# Patient Record
Sex: MALE | Race: BLACK OR AFRICAN AMERICAN | URBAN - METROPOLITAN AREA
[De-identification: names, ages, dates, MRNs, and addresses within clinical notes are randomized per-mention and may not be internally consistent; named-entity substitution may affect disease eponyms.]

---

## 2017-06-14 ENCOUNTER — EMERGENCY (EMERGENCY)
Facility: HOSPITAL | Age: 67
LOS: 0 days | Discharge: HOME | End: 2017-06-14

## 2017-06-14 DIAGNOSIS — E87.5 HYPERKALEMIA: ICD-10-CM

## 2017-06-28 DIAGNOSIS — N40.0 BENIGN PROSTATIC HYPERPLASIA WITHOUT LOWER URINARY TRACT SYMPTOMS: ICD-10-CM

## 2017-06-28 DIAGNOSIS — Z79.899 OTHER LONG TERM (CURRENT) DRUG THERAPY: ICD-10-CM

## 2017-06-28 DIAGNOSIS — I10 ESSENTIAL (PRIMARY) HYPERTENSION: ICD-10-CM

## 2017-06-28 DIAGNOSIS — M54.2 CERVICALGIA: ICD-10-CM

## 2017-06-28 DIAGNOSIS — Z79.891 LONG TERM (CURRENT) USE OF OPIATE ANALGESIC: ICD-10-CM

## 2019-02-11 ENCOUNTER — APPOINTMENT (OUTPATIENT)
Dept: CARDIOLOGY | Facility: CLINIC | Age: 69
End: 2019-02-11

## 2019-02-11 VITALS
HEIGHT: 71 IN | BODY MASS INDEX: 35.14 KG/M2 | SYSTOLIC BLOOD PRESSURE: 130 MMHG | DIASTOLIC BLOOD PRESSURE: 82 MMHG | WEIGHT: 251 LBS | HEART RATE: 74 BPM

## 2019-02-11 DIAGNOSIS — Z78.9 OTHER SPECIFIED HEALTH STATUS: ICD-10-CM

## 2019-02-11 NOTE — ASSESSMENT
[FreeTextEntry1] : Chest pain - atypical.\par PVC's - isolated\par BP - controlled\par DL - on Zocor.\par \par Obtain stress echo .\par Cath films reviewed.\par If normal, c/w primary prevention.\par Return after the test.

## 2019-02-11 NOTE — PHYSICAL EXAM
[Normal Appearance] : normal appearance [General Appearance - Well Nourished] : well nourished [No Deformities] : no deformities [General Appearance - In No Acute Distress] : no acute distress [Normal Conjunctiva] : the conjunctiva exhibited no abnormalities [Normal Oral Mucosa] : normal oral mucosa [Normal Oropharynx] : normal oropharynx [FreeTextEntry1] : no JVD [] : no respiratory distress [Respiration, Rhythm And Depth] : normal respiratory rhythm and effort [Auscultation Breath Sounds / Voice Sounds] : lungs were clear to auscultation bilaterally [Heart Rate And Rhythm] : heart rate and rhythm were normal [Heart Sounds] : normal S1 and S2 [Edema] : no peripheral edema present [Systolic grade ___/6] : A grade [unfilled]/6 systolic murmur was heard. [Bowel Sounds] : normal bowel sounds [Abdomen Soft] : soft [Abdomen Tenderness] : non-tender [Abnormal Walk] : normal gait [Nail Clubbing] : no clubbing of the fingernails [Cyanosis, Localized] : no localized cyanosis [Skin Color & Pigmentation] : normal skin color and pigmentation [Oriented To Time, Place, And Person] : oriented to person, place, and time [Affect] : the affect was normal

## 2019-02-11 NOTE — HISTORY OF PRESENT ILLNESS
[FreeTextEntry1] : 67 y/o male with history of HTN, DL, non-obstructive CAD (cath in 2015 - mild plaque). Presents for evaluation of occasional PVC's and discomfort in lower chest/epigastric area. No syncope. + constipation.

## 2019-03-19 ENCOUNTER — RESULT REVIEW (OUTPATIENT)
Age: 69
End: 2019-03-19

## 2019-03-19 ENCOUNTER — OUTPATIENT (OUTPATIENT)
Dept: OUTPATIENT SERVICES | Facility: HOSPITAL | Age: 69
LOS: 1 days | Discharge: HOME | End: 2019-03-19

## 2019-03-19 ENCOUNTER — TRANSCRIPTION ENCOUNTER (OUTPATIENT)
Age: 69
End: 2019-03-19

## 2019-03-19 VITALS
WEIGHT: 250 LBS | DIASTOLIC BLOOD PRESSURE: 96 MMHG | RESPIRATION RATE: 18 BRPM | HEART RATE: 68 BPM | TEMPERATURE: 98 F | SYSTOLIC BLOOD PRESSURE: 167 MMHG | HEIGHT: 70 IN

## 2019-03-19 VITALS — SYSTOLIC BLOOD PRESSURE: 147 MMHG | DIASTOLIC BLOOD PRESSURE: 76 MMHG | HEART RATE: 88 BPM | RESPIRATION RATE: 18 BRPM

## 2019-03-19 DIAGNOSIS — H26.9 UNSPECIFIED CATARACT: Chronic | ICD-10-CM

## 2019-03-19 NOTE — ASU DISCHARGE PLAN (ADULT/PEDIATRIC) - FOLLOW UP APPOINTMENTS
911 or go to the nearest Emergency Room [facility]:  Endoscopy/Ambulatory Surgery [place]...

## 2019-03-19 NOTE — ASU PATIENT PROFILE, ADULT - PMH
Back pain    Enlarged heart    Enlarged prostate    GERD (gastroesophageal reflux disease)    HTN (hypertension)    Hypercholesteremia    Sleep apnea

## 2019-03-20 LAB — SURGICAL PATHOLOGY STUDY: SIGNIFICANT CHANGE UP

## 2019-03-21 ENCOUNTER — APPOINTMENT (OUTPATIENT)
Dept: CARDIOLOGY | Facility: CLINIC | Age: 69
End: 2019-03-21

## 2019-03-25 DIAGNOSIS — K31.89 OTHER DISEASES OF STOMACH AND DUODENUM: ICD-10-CM

## 2019-03-25 DIAGNOSIS — Z87.891 PERSONAL HISTORY OF NICOTINE DEPENDENCE: ICD-10-CM

## 2019-03-25 DIAGNOSIS — K29.60 OTHER GASTRITIS WITHOUT BLEEDING: ICD-10-CM

## 2019-03-25 DIAGNOSIS — K26.9 DUODENAL ULCER, UNSPECIFIED AS ACUTE OR CHRONIC, WITHOUT HEMORRHAGE OR PERFORATION: ICD-10-CM

## 2019-03-25 DIAGNOSIS — E78.00 PURE HYPERCHOLESTEROLEMIA, UNSPECIFIED: ICD-10-CM

## 2019-03-25 DIAGNOSIS — I10 ESSENTIAL (PRIMARY) HYPERTENSION: ICD-10-CM

## 2019-04-01 ENCOUNTER — APPOINTMENT (OUTPATIENT)
Dept: CARDIOLOGY | Facility: CLINIC | Age: 69
End: 2019-04-01
Payer: COMMERCIAL

## 2019-04-01 VITALS
WEIGHT: 255 LBS | HEART RATE: 80 BPM | HEIGHT: 71 IN | BODY MASS INDEX: 35.7 KG/M2 | DIASTOLIC BLOOD PRESSURE: 88 MMHG | SYSTOLIC BLOOD PRESSURE: 134 MMHG

## 2019-04-01 PROCEDURE — 99213 OFFICE O/P EST LOW 20 MIN: CPT

## 2019-04-01 NOTE — HISTORY OF PRESENT ILLNESS
[FreeTextEntry1] : 68 y/o male with history of HTN, DL, non-obstructive CAD (cath in 2015 - mild plaque). Presents for evaluation of occasional PVC's and discomfort in lower chest/epigastric area. No syncope. + constipation. he was referred for a stress test, but cancelled and now would like to reschedule. No new events. BP is controlled.

## 2019-04-01 NOTE — ASSESSMENT
[FreeTextEntry1] : Chest pain - atypical.\par PVC's - isolated\par BP - controlled\par DL - on Zocor.\par \par Obtain stress echo . Reschedule\par Cath report reviewed.\par Rational for the test discussed with the patient.\par If normal, c/w primary prevention.\par Return after the test.

## 2019-04-02 PROBLEM — I51.7 CARDIOMEGALY: Chronic | Status: ACTIVE | Noted: 2019-03-19

## 2019-04-02 PROBLEM — K21.9 GASTRO-ESOPHAGEAL REFLUX DISEASE WITHOUT ESOPHAGITIS: Chronic | Status: ACTIVE | Noted: 2019-03-19

## 2019-04-02 PROBLEM — M54.9 DORSALGIA, UNSPECIFIED: Chronic | Status: ACTIVE | Noted: 2019-03-19

## 2019-04-02 PROBLEM — E78.00 PURE HYPERCHOLESTEROLEMIA, UNSPECIFIED: Chronic | Status: ACTIVE | Noted: 2019-03-19

## 2019-04-02 PROBLEM — G47.30 SLEEP APNEA, UNSPECIFIED: Chronic | Status: ACTIVE | Noted: 2019-03-19

## 2019-04-02 PROBLEM — I10 ESSENTIAL (PRIMARY) HYPERTENSION: Chronic | Status: ACTIVE | Noted: 2019-03-19

## 2019-04-02 PROBLEM — N40.0 BENIGN PROSTATIC HYPERPLASIA WITHOUT LOWER URINARY TRACT SYMPTOMS: Chronic | Status: ACTIVE | Noted: 2019-03-19

## 2019-05-14 ENCOUNTER — APPOINTMENT (OUTPATIENT)
Dept: CARDIOLOGY | Facility: CLINIC | Age: 69
End: 2019-05-14
Payer: COMMERCIAL

## 2019-05-14 PROCEDURE — 93351 STRESS TTE COMPLETE: CPT

## 2019-05-20 ENCOUNTER — APPOINTMENT (OUTPATIENT)
Dept: CARDIOLOGY | Facility: CLINIC | Age: 69
End: 2019-05-20
Payer: COMMERCIAL

## 2019-05-20 VITALS
BODY MASS INDEX: 35.42 KG/M2 | DIASTOLIC BLOOD PRESSURE: 78 MMHG | HEIGHT: 71 IN | SYSTOLIC BLOOD PRESSURE: 128 MMHG | HEART RATE: 91 BPM | WEIGHT: 253 LBS

## 2019-05-20 PROCEDURE — 99213 OFFICE O/P EST LOW 20 MIN: CPT

## 2019-05-20 PROCEDURE — 93000 ELECTROCARDIOGRAM COMPLETE: CPT

## 2019-05-20 NOTE — HISTORY OF PRESENT ILLNESS
[FreeTextEntry1] : 68 y/o male with history of HTN, DL, non-obstructive CAD (cath in 2015 - mild plaque). Presents for evaluation of occasional PVC's and discomfort in lower chest/epigastric area. No syncope. + constipation.  No new events. BP is controlled. Had a stress echo 5/14/19, which was negative for ischemia.

## 2019-05-20 NOTE — ASSESSMENT
[FreeTextEntry1] : Chest pain - atypical.\par PVC's - isolated\par BP - controlled\par DL - on Zocor.\par \par Negative stress echo 05/2019. \par Cath report - non-obstructive 2015\par c/w primary prevention.\par weight loss discussed.\par Return PRN

## 2019-07-16 NOTE — H&P PST ADULT - NSICDXPASTMEDICALHX_GEN_ALL_CORE_FT
PAST MEDICAL HISTORY:  Back pain     Enlarged heart     Enlarged prostate     GERD (gastroesophageal reflux disease)     HTN (hypertension)     Hypercholesteremia     Sleep apnea

## 2019-10-19 ENCOUNTER — EMERGENCY (EMERGENCY)
Facility: HOSPITAL | Age: 69
LOS: 0 days | Discharge: HOME | End: 2019-10-19
Attending: EMERGENCY MEDICINE | Admitting: EMERGENCY MEDICINE
Payer: COMMERCIAL

## 2019-10-19 VITALS
DIASTOLIC BLOOD PRESSURE: 77 MMHG | TEMPERATURE: 97 F | OXYGEN SATURATION: 97 % | RESPIRATION RATE: 18 BRPM | HEART RATE: 69 BPM | SYSTOLIC BLOOD PRESSURE: 144 MMHG

## 2019-10-19 VITALS
DIASTOLIC BLOOD PRESSURE: 75 MMHG | HEIGHT: 69 IN | RESPIRATION RATE: 18 BRPM | HEART RATE: 64 BPM | OXYGEN SATURATION: 96 % | WEIGHT: 250 LBS | SYSTOLIC BLOOD PRESSURE: 145 MMHG | TEMPERATURE: 97 F

## 2019-10-19 DIAGNOSIS — H26.9 UNSPECIFIED CATARACT: Chronic | ICD-10-CM

## 2019-10-19 DIAGNOSIS — R74.8 ABNORMAL LEVELS OF OTHER SERUM ENZYMES: ICD-10-CM

## 2019-10-19 DIAGNOSIS — I10 ESSENTIAL (PRIMARY) HYPERTENSION: ICD-10-CM

## 2019-10-19 DIAGNOSIS — Z87.891 PERSONAL HISTORY OF NICOTINE DEPENDENCE: ICD-10-CM

## 2019-10-19 DIAGNOSIS — E78.00 PURE HYPERCHOLESTEROLEMIA, UNSPECIFIED: ICD-10-CM

## 2019-10-19 DIAGNOSIS — R10.84 GENERALIZED ABDOMINAL PAIN: ICD-10-CM

## 2019-10-19 DIAGNOSIS — G89.29 OTHER CHRONIC PAIN: ICD-10-CM

## 2019-10-19 DIAGNOSIS — R10.13 EPIGASTRIC PAIN: ICD-10-CM

## 2019-10-19 LAB
ALBUMIN SERPL ELPH-MCNC: 4.5 G/DL — SIGNIFICANT CHANGE UP (ref 3.5–5.2)
ALP SERPL-CCNC: 60 U/L — SIGNIFICANT CHANGE UP (ref 30–115)
ALT FLD-CCNC: 22 U/L — SIGNIFICANT CHANGE UP (ref 0–41)
ANION GAP SERPL CALC-SCNC: 11 MMOL/L — SIGNIFICANT CHANGE UP (ref 7–14)
APPEARANCE UR: CLEAR — SIGNIFICANT CHANGE UP
AST SERPL-CCNC: 26 U/L — SIGNIFICANT CHANGE UP (ref 0–41)
BASOPHILS # BLD AUTO: 0.03 K/UL — SIGNIFICANT CHANGE UP (ref 0–0.2)
BASOPHILS NFR BLD AUTO: 0.6 % — SIGNIFICANT CHANGE UP (ref 0–1)
BILIRUB SERPL-MCNC: 0.6 MG/DL — SIGNIFICANT CHANGE UP (ref 0.2–1.2)
BILIRUB UR-MCNC: NEGATIVE — SIGNIFICANT CHANGE UP
BUN SERPL-MCNC: 24 MG/DL — HIGH (ref 10–20)
CALCIUM SERPL-MCNC: 9.9 MG/DL — SIGNIFICANT CHANGE UP (ref 8.5–10.1)
CHLORIDE SERPL-SCNC: 104 MMOL/L — SIGNIFICANT CHANGE UP (ref 98–110)
CO2 SERPL-SCNC: 25 MMOL/L — SIGNIFICANT CHANGE UP (ref 17–32)
COLOR SPEC: SIGNIFICANT CHANGE UP
CREAT SERPL-MCNC: 1.6 MG/DL — HIGH (ref 0.7–1.5)
DIFF PNL FLD: NEGATIVE — SIGNIFICANT CHANGE UP
EOSINOPHIL # BLD AUTO: 0.14 K/UL — SIGNIFICANT CHANGE UP (ref 0–0.7)
EOSINOPHIL NFR BLD AUTO: 3 % — SIGNIFICANT CHANGE UP (ref 0–8)
GLUCOSE SERPL-MCNC: 150 MG/DL — HIGH (ref 70–99)
GLUCOSE UR QL: NEGATIVE — SIGNIFICANT CHANGE UP
HCT VFR BLD CALC: 42.9 % — SIGNIFICANT CHANGE UP (ref 42–52)
HGB BLD-MCNC: 13.5 G/DL — LOW (ref 14–18)
IMM GRANULOCYTES NFR BLD AUTO: 0.2 % — SIGNIFICANT CHANGE UP (ref 0.1–0.3)
KETONES UR-MCNC: NEGATIVE — SIGNIFICANT CHANGE UP
LACTATE SERPL-SCNC: 2.2 MMOL/L — SIGNIFICANT CHANGE UP (ref 0.5–2.2)
LEUKOCYTE ESTERASE UR-ACNC: NEGATIVE — SIGNIFICANT CHANGE UP
LIDOCAIN IGE QN: 223 U/L — HIGH (ref 7–60)
LYMPHOCYTES # BLD AUTO: 2.28 K/UL — SIGNIFICANT CHANGE UP (ref 1.2–3.4)
LYMPHOCYTES # BLD AUTO: 48.7 % — SIGNIFICANT CHANGE UP (ref 20.5–51.1)
MCHC RBC-ENTMCNC: 28.4 PG — SIGNIFICANT CHANGE UP (ref 27–31)
MCHC RBC-ENTMCNC: 31.5 G/DL — LOW (ref 32–37)
MCV RBC AUTO: 90.3 FL — SIGNIFICANT CHANGE UP (ref 80–94)
MONOCYTES # BLD AUTO: 0.44 K/UL — SIGNIFICANT CHANGE UP (ref 0.1–0.6)
MONOCYTES NFR BLD AUTO: 9.4 % — HIGH (ref 1.7–9.3)
NEUTROPHILS # BLD AUTO: 1.78 K/UL — SIGNIFICANT CHANGE UP (ref 1.4–6.5)
NEUTROPHILS NFR BLD AUTO: 38.1 % — LOW (ref 42.2–75.2)
NITRITE UR-MCNC: NEGATIVE — SIGNIFICANT CHANGE UP
NRBC # BLD: 0 /100 WBCS — SIGNIFICANT CHANGE UP (ref 0–0)
PH UR: 6 — SIGNIFICANT CHANGE UP (ref 5–8)
PLATELET # BLD AUTO: 148 K/UL — SIGNIFICANT CHANGE UP (ref 130–400)
POTASSIUM SERPL-MCNC: 5.3 MMOL/L — HIGH (ref 3.5–5)
POTASSIUM SERPL-SCNC: 5.3 MMOL/L — HIGH (ref 3.5–5)
PROT SERPL-MCNC: 7 G/DL — SIGNIFICANT CHANGE UP (ref 6–8)
PROT UR-MCNC: NEGATIVE — SIGNIFICANT CHANGE UP
RBC # BLD: 4.75 M/UL — SIGNIFICANT CHANGE UP (ref 4.7–6.1)
RBC # FLD: 12 % — SIGNIFICANT CHANGE UP (ref 11.5–14.5)
SODIUM SERPL-SCNC: 140 MMOL/L — SIGNIFICANT CHANGE UP (ref 135–146)
SP GR SPEC: 1.02 — SIGNIFICANT CHANGE UP (ref 1.01–1.02)
UROBILINOGEN FLD QL: SIGNIFICANT CHANGE UP
WBC # BLD: 4.68 K/UL — LOW (ref 4.8–10.8)
WBC # FLD AUTO: 4.68 K/UL — LOW (ref 4.8–10.8)

## 2019-10-19 PROCEDURE — 99284 EMERGENCY DEPT VISIT MOD MDM: CPT

## 2019-10-19 PROCEDURE — 74176 CT ABD & PELVIS W/O CONTRAST: CPT | Mod: 26

## 2019-10-19 PROCEDURE — 93010 ELECTROCARDIOGRAM REPORT: CPT

## 2019-10-19 RX ORDER — FAMOTIDINE 10 MG/ML
20 INJECTION INTRAVENOUS ONCE
Refills: 0 | Status: COMPLETED | OUTPATIENT
Start: 2019-10-19 | End: 2019-10-19

## 2019-10-19 RX ORDER — SODIUM CHLORIDE 9 MG/ML
1000 INJECTION INTRAMUSCULAR; INTRAVENOUS; SUBCUTANEOUS ONCE
Refills: 0 | Status: COMPLETED | OUTPATIENT
Start: 2019-10-19 | End: 2019-10-19

## 2019-10-19 RX ADMIN — SODIUM CHLORIDE 2000 MILLILITER(S): 9 INJECTION INTRAMUSCULAR; INTRAVENOUS; SUBCUTANEOUS at 07:58

## 2019-10-19 RX ADMIN — FAMOTIDINE 20 MILLIGRAM(S): 10 INJECTION INTRAVENOUS at 12:05

## 2019-10-19 RX ADMIN — SODIUM CHLORIDE 1000 MILLILITER(S): 9 INJECTION INTRAMUSCULAR; INTRAVENOUS; SUBCUTANEOUS at 10:47

## 2019-10-19 NOTE — ED PROVIDER NOTE - PMH
Back pain    Enlarged heart    Enlarged prostate    GERD (gastroesophageal reflux disease)    HTN (hypertension)    Hypercholesteremia    Sleep apnea Arthritis    Back pain    Borderline diabetes    Enlarged heart    Enlarged prostate    GERD (gastroesophageal reflux disease)    HTN (hypertension)    Hypercholesteremia    Renal disease    Sleep apnea

## 2019-10-19 NOTE — ED PROVIDER NOTE - NSFOLLOWUPINSTRUCTIONS_ED_ALL_ED_FT
Please call to make an appointment to follow up with your PMD and GI as outpatient.        Abdominal Pain    Many things can cause abdominal pain. . Your health care provider will do a physical exam to determine if there is a dangerous cause of your pain; blood tests and imaging can sometimes help determine the cause of your pain. However, in many cases, no cause may be found and you may need further testing as an outpatient. Monitor your abdominal pain for any changes.     SEEK IMMEDIATE MEDICAL CARE IF YOU HAVE ANY OF THE FOLLOWING SYMPTOMS: worsening abdominal pain, uncontrollable vomiting, profuse diarrhea, inability to have bowel movements or pass gas, black or bloody stools, fever accompanying chest pain or back pain, or fainting. These symptoms may represent a serious problem that is an emergency. Do not wait to see if the symptoms will go away. Get medical help right away. Call 911 and do not drive yourself to the hospital.

## 2019-10-19 NOTE — ED ADULT NURSE NOTE - CHPI ED NUR SYMPTOMS NEG
no vomiting/no hematuria/no nausea/no dysuria/no fever/no blood in stool/no burning urination/no chills

## 2019-10-19 NOTE — ED PROVIDER NOTE - ATTENDING CONTRIBUTION TO CARE
70 yo male h/o HTN, CKD, arthritis, GERD, elevated cholesterol c/o vague upper abdominal and back pain for a few months, worse over past few weeks.  Pain is non-radiating , located mostly on  in a band like distribution  under the ribs, not associated with PO intake or breathing.  Patient denies  fever, chills, weight loss, SOB. cough, CP, urinary complaints, black or bloody stools. no focal weakness or paresthesias, no change in exercise tolerance.  Did not take anything for pain and declined analgesia in ED.  Has seen his PMD for this already and has a follow up plan in place.  Well-appearing, well-nourished male, NAD, PERRL, mmm, nml work of breathing, lungs CTA b/l, equal air entry, RRR, distal pulses intact, no midline spine or CVA ttp, abdomen soft, non-distended  mild epigastric ttp without rebound or guarding, no focal neuro deficits.  Will check labs, CT scan.

## 2019-10-19 NOTE — ED PROVIDER NOTE - NS ED ROS FT
Constitutional: (-) fever (-) chills   Cardiac: (-) chest pain    Respiratory: (-) SOB  GI: (+) abdominal pain (+) loose stool (-) nausea (-) vomiting (-) hematochezia (-) changes in appetite (-) hx of nephrolithiasis  : (-) dysuria (-) increased frequency  (-) hematuria (-) incontinence  MS: (+) chronic back pain   Neuro: (-) headache (-) dizziness (-) numbness/tingling to extremities B/L (-) weakness  Skin: (-) rash   Except as documented in the HPI, all other systems are negative.

## 2019-10-19 NOTE — ED PROVIDER NOTE - PATIENT PORTAL LINK FT
You can access the FollowMyHealth Patient Portal offered by [facility] by registering at the following website: [email or web address]. By joining [employer] [facility]’s FollowMyHealth portal, you will also be able to view your health information using other applications (apps) compatible with our system.

## 2019-10-19 NOTE — ED PROVIDER NOTE - NSFOLLOWUPCLINICS_GEN_ALL_ED_FT
[facility]  Medicine  [address], [place]   Phone: [phone]  Fax:   Follow Up Time:

## 2019-10-19 NOTE — ED ADULT NURSE REASSESSMENT NOTE - NS ED NURSE REASSESS COMMENT FT1
pt a&ox4,steady gait exhibited, denies CP/SOB, c/o abdominal pain x4 weeks, awaiting CT A&P, IV placed, labs sent.

## 2019-10-19 NOTE — ED PROVIDER NOTE - CLINICAL SUMMARY MEDICAL DECISION MAKING FREE TEXT BOX
70 yo male with chronic abdominal pain, work up in ED negative, has appropriate follow up for further testing, strict return precautions given. 70 yo male with chronic abdominal pain, work up in ED negative, has appropriate follow up for further testing, strict return precautions given..

## 2019-10-19 NOTE — ED PROVIDER NOTE - PHYSICAL EXAMINATION
GENERAL: Well-nourished, Well-developed. NAD  Eyes: PERRLA, EOMI  ENMT: MMM  Neck: FROM  CVS: Normal S1,S2. No murmurs appreciated on auscultation   RESP: No use of accessory muscles. Chest rise symmetrical with good expansion. Lungs clear to auscultation B/L. No wheezing, rales, or rhonchi auscultated.  GI: + TTP to midepigastric region. Normal auscultation of bowel sounds in all 4 quadrants. Soft, Nondistended. No guarding or rebound tenderness. No CVAT B/L.  MSK: No visible signs of trauma such as ecchymosis, erythema, or swelling. FROM of upper and lower extremities B/L. No midline spinal TTP. No TTP to sciatic notch B/L.  Skin: Warm, Dry. No rashes or lesions.   EXT: Radial pulses present B/L. No calf tenderness or swelling B/L.   Neuro: AA&O x 3. CNs II-XII grossly intact. Speaking in full sentences. No slurring of speech. No facial droop. No tremors. Sensation grossly intact. Strength 5/5 B/L. Gait within normal limits.   Psych: Cooperative. Calm

## 2019-10-19 NOTE — ED PROVIDER NOTE - PROGRESS NOTE DETAILS
Lipase is elevated  to 223, no pain whole eating/drinking, CT scan negative ( non-contrast, patient refused it); he told me he is already scheduled for MRI of the abdomen in 10 days and has a follow up plan.  Patient to be discharged from ED. Any available test results were discussed with patient Verbal instructions given, including instructions to return to ED immediately for any new, worsening, or concerning symptoms. Patient endorsed understanding. Written discharge instructions additionally given, including follow-up plan.  Patient was given opportunity to ask questions.

## 2019-10-19 NOTE — ED PROVIDER NOTE - OBJECTIVE STATEMENT
70 yo male former smoker with PMH of borderline DM, HTN, Early SOILA (controlled by lifestyle changes as of now), arthritis, 68 yo male former smoker with PMH of borderline DM, HTN, Early SOILA (controlled by lifestyle changes as of now), arthritis presents to the ED c/o intermittent diffuse abdominal pain x 4 weeks associated with episodes of loose, non-bloody stools x  4 weeks and c/o lower back pain that radiates down legs B/L.  Denies fever, chills, chest pain, SOB, bloody stools, dysuria, hematuria, hx of nephrolithiasis, or N/V/D.

## 2019-10-19 NOTE — ED ADULT NURSE NOTE - OBJECTIVE STATEMENT
Patient is a 68 yo male c/o intermittent abd cramping that radiates to bilateral groin x 4 weeks, patient also c/o constipation, LBM was yesterday. denies nausea, vomiting, urinary symptoms, fever and chills.

## 2019-10-19 NOTE — ED PROVIDER NOTE - CARE PROVIDER_API CALL
Herbie Agee)  Gastroenterology; Internal Medicine  [address] [place] [address]  Phone: [phone]  Fax: [phone]  Follow Up Time:     YOUR PMD AND GI,   Phone: (   )    -  Fax: (   )    -  Follow Up Time:     Red Huerta)  Gastroenterology; Internal Medicine  [address] [place] [address]  Phone: [phone]  Fax: [phone]  Follow Up Time:

## 2019-10-20 LAB
CULTURE RESULTS: SIGNIFICANT CHANGE UP
SPECIMEN SOURCE: SIGNIFICANT CHANGE UP

## 2019-12-05 PROBLEM — M19.90 UNSPECIFIED OSTEOARTHRITIS, UNSPECIFIED SITE: Chronic | Status: ACTIVE | Noted: 2019-10-19

## 2019-12-05 PROBLEM — N28.9 DISORDER OF KIDNEY AND URETER, UNSPECIFIED: Chronic | Status: ACTIVE | Noted: 2019-10-19

## 2019-12-05 PROBLEM — R73.03 PREDIABETES: Chronic | Status: ACTIVE | Noted: 2019-10-19

## 2019-12-19 ENCOUNTER — APPOINTMENT (OUTPATIENT)
Dept: UROLOGY | Facility: CLINIC | Age: 69
End: 2019-12-19
Payer: COMMERCIAL

## 2019-12-19 VITALS — HEIGHT: 71 IN | BODY MASS INDEX: 35.42 KG/M2 | WEIGHT: 253 LBS

## 2019-12-19 VITALS — SYSTOLIC BLOOD PRESSURE: 169 MMHG | HEART RATE: 81 BPM | DIASTOLIC BLOOD PRESSURE: 86 MMHG

## 2019-12-19 PROCEDURE — 99204 OFFICE O/P NEW MOD 45 MIN: CPT

## 2019-12-19 RX ORDER — PREGABALIN 100 MG/1
100 CAPSULE ORAL 3 TIMES DAILY
Refills: 0 | Status: COMPLETED | COMMUNITY
End: 2019-12-19

## 2019-12-19 RX ORDER — ACETAMINOPHEN 325 MG/1
325 TABLET ORAL EVERY 6 HOURS
Refills: 0 | Status: COMPLETED | COMMUNITY
End: 2019-12-19

## 2019-12-19 RX ORDER — EPLERENONE 25 MG/1
25 TABLET, COATED ORAL DAILY
Refills: 0 | Status: COMPLETED | COMMUNITY
End: 2019-12-19

## 2019-12-19 RX ORDER — LUBIPROSTONE 24 UG/1
24 CAPSULE, GELATIN COATED ORAL DAILY
Refills: 0 | Status: COMPLETED | COMMUNITY
End: 2019-12-19

## 2019-12-19 RX ORDER — NALDEMEDINE 0.2 MG/1
0.2 TABLET ORAL DAILY
Refills: 0 | Status: COMPLETED | COMMUNITY
End: 2019-12-19

## 2019-12-19 RX ORDER — EPLERENONE 25 MG/1
25 TABLET, COATED ORAL
Refills: 0 | Status: ACTIVE | COMMUNITY

## 2019-12-19 RX ORDER — DOCUSATE SODIUM 100 MG/1
100 CAPSULE ORAL 3 TIMES DAILY
Refills: 0 | Status: COMPLETED | COMMUNITY
End: 2019-12-19

## 2019-12-19 RX ORDER — CALCIUM CARBONATE/VITAMIN D3 500MG-5MCG
500-200 TABLET ORAL DAILY
Refills: 0 | Status: COMPLETED | COMMUNITY
End: 2019-12-19

## 2019-12-19 RX ORDER — ALFUZOSIN HYDROCHLORIDE 10 MG/1
10 TABLET, EXTENDED RELEASE ORAL AT BEDTIME
Refills: 0 | Status: COMPLETED | COMMUNITY
End: 2019-12-19

## 2019-12-19 RX ORDER — METHYLNALTREXONE BROMIDE 150 MG/1
150 TABLET ORAL
Refills: 0 | Status: COMPLETED | COMMUNITY
End: 2019-12-19

## 2019-12-19 RX ORDER — SIMVASTATIN 20 MG/1
20 TABLET, FILM COATED ORAL AT BEDTIME
Refills: 0 | Status: COMPLETED | COMMUNITY
End: 2019-12-19

## 2019-12-19 RX ORDER — FOLIC ACID 1 MG/1
1 TABLET ORAL DAILY
Refills: 0 | Status: COMPLETED | COMMUNITY
End: 2019-12-19

## 2019-12-19 RX ORDER — DEXLANSOPRAZOLE 60 MG/1
60 CAPSULE, DELAYED RELEASE ORAL
Refills: 0 | Status: COMPLETED | COMMUNITY
End: 2019-12-19

## 2019-12-19 NOTE — LETTER HEADER
[FreeTextEntry3] : Sherly Shepard M.D.\par Director of Urology\par [facility]\par [address] [place] [address], [address]\[place] [address]

## 2019-12-19 NOTE — ASSESSMENT
[FreeTextEntry1] : His PSA, at 4. 6 mg/mL could be slightly inaccurate as we do not know how long and how compliant he's been with Dutasteride. He had blood work, from February 2018 that showed a PSA of 2.4. Again we do not know if he was on Dutasteride at that time. His MRI is of little value as is without contrast and was not appropriate prostate MRI\par \par For now he will continue Dutasteride, and understands to be compliant for 3 months and THEN we'll get a repeat PSA at that time. If value is still 4.6, or higher, will discuss repeat MRI, then with pre and post gadolinium or prostate biopsy.\par \par Concerning his erectile dysfunction, we'll obtain a full hormonal panel, especially since he's been on Arimidex and noncompliant with this medication, for further investigation. Additionally, secondary to his cardiac history, we will request clearance for PDE 5 inhibitors.\par \par With regards to his urination, we'll obtain renal/bladder ultrasound, UA CS and cytology, he will keep a voiding diary, and follow up for possible uroflow study

## 2019-12-19 NOTE — LETTER BODY
[Dear  ___] : Dear  [unfilled], [Consult Letter:] : I had the pleasure of evaluating your patient, [unfilled]. [Please see my note below.] : Please see my note below. [Consult Closing:] : Thank you very much for allowing me to participate in the care of this patient.  If you have any questions, please do not hesitate to contact me. [Sincerely,] : Sincerely, [FreeTextEntry2] : Dr. Claudia Victor\par [address] [facility]\[place] [address]

## 2019-12-19 NOTE — HISTORY OF PRESENT ILLNESS
[Urinary Urgency] : urinary urgency [Urinary Frequency] : urinary frequency [Erectile Dysfunction] : Erectile Dysfunction [FreeTextEntry1] : Braydon is a 69-year-old male with a history of BPH and possible urinary tract symptoms who presents for evaluation of elevated PSA value.\par \par Initially he was followed by Dr. Reveles who started him on Rapaflo for possible urinary tract symptoms. He reports improvement in his voiding reduction of his frequency, urgency, and nocturia. Approximately 3 months ago he was placed on Dutasteride for continued urinary symptoms. He states he has been noncompliant with his medication taking it intermittently over 3 months. He discontinued one month ago and has restarted approximately 2-3 weeks ago.\par \par He was told by Dr. Reveles initially that his PSA was within normal limits. He obtained a PSA, from his PCP, in December 2019 which are value of 4.6 ng/mL. \par His PCP ordered a prostate MRI however, which was done just without contrast and revealed a lobulated heterogeneous gland of the prostate impressing the urinary bladder. Unremarkable peripheral zone. No pelvic or inguinal adenopathy.\par \par He was instructed to follow up with urology. He was unable to get an appointment with Dr. Reveles and did not want to wait to see him and elects to follow up here instead.\par \par Additionally he has history of elevated estradiol has been placed on arimidex. He takes 1 mg one time per week as he says it is too small to cut although he was told to take a half tablet QOD, he is not doing so.\par \par He reports decreased libido/energy with erectile dysfunction, x 6 months. Reports inability to obtain or maintain an erection. He has not tried PDE 5 inhibitors.

## 2019-12-19 NOTE — PHYSICAL EXAM
[General Appearance - Well Developed] : well developed [General Appearance - Well Nourished] : well nourished [Normal Appearance] : normal appearance [Well Groomed] : well groomed [General Appearance - In No Acute Distress] : no acute distress [Heart Rate And Rhythm] : Heart rate and rhythm were normal [Respiration, Rhythm And Depth] : normal respiratory rhythm and effort [Exaggerated Use Of Accessory Muscles For Inspiration] : no accessory muscle use [Bowel Sounds] : normal bowel sounds [Auscultation Breath Sounds / Voice Sounds] : lungs were clear to auscultation bilaterally [Abdomen Soft] : soft [Abdomen Tenderness] : non-tender [Costovertebral Angle Tenderness] : no ~M costovertebral angle tenderness [Urethral Meatus] : meatus normal [Penis Abnormality] : normal circumcised penis [Urinary Bladder Findings] : the bladder was normal on palpation [Scrotum] : the scrotum was normal [Epididymis] : the epididymides were normal [Testes Tenderness] : no tenderness of the testes [Testes Mass (___cm)] : there were no testicular masses [Anus Abnormality] : the anus and perineum were normal [Prostate Enlargement] : the prostate was not enlarged [Prostate Tenderness] : the prostate was not tender [No Prostate Nodules] : no prostate nodules [Prostate Size ___ gm] : prostate size [unfilled] gm [Normal Station and Gait] : the gait and station were normal for the patient's age [] : no rash [No Focal Deficits] : no focal deficits [Affect] : the affect was normal [Oriented To Time, Place, And Person] : oriented to person, place, and time [Not Anxious] : not anxious [Mood] : the mood was normal [Femoral Lymph Nodes Enlarged Bilaterally] : femoral [Inguinal Lymph Nodes Enlarged Bilaterally] : inguinal [FreeTextEntry1] : Intracavernosal sponge plaque with some sinusoidal atrophy. Digital rectal examination reveals decreased rectal tone with a less than 10 g prostate. Normal BC reflex

## 2019-12-25 ENCOUNTER — FORM ENCOUNTER (OUTPATIENT)
Age: 69
End: 2019-12-25

## 2019-12-26 ENCOUNTER — OUTPATIENT (OUTPATIENT)
Dept: OUTPATIENT SERVICES | Facility: HOSPITAL | Age: 69
LOS: 1 days | Discharge: HOME | End: 2019-12-26
Payer: COMMERCIAL

## 2019-12-26 DIAGNOSIS — N52.9 MALE ERECTILE DYSFUNCTION, UNSPECIFIED: ICD-10-CM

## 2019-12-26 DIAGNOSIS — R68.82 DECREASED LIBIDO: ICD-10-CM

## 2019-12-26 DIAGNOSIS — R39.15 URGENCY OF URINATION: ICD-10-CM

## 2019-12-26 DIAGNOSIS — H26.9 UNSPECIFIED CATARACT: Chronic | ICD-10-CM

## 2019-12-26 DIAGNOSIS — N40.1 BENIGN PROSTATIC HYPERPLASIA WITH LOWER URINARY TRACT SYMPTOMS: ICD-10-CM

## 2019-12-26 DIAGNOSIS — R97.20 ELEVATED PROSTATE SPECIFIC ANTIGEN [PSA]: ICD-10-CM

## 2019-12-26 PROCEDURE — 76770 US EXAM ABDO BACK WALL COMP: CPT | Mod: 26

## 2020-01-13 ENCOUNTER — APPOINTMENT (OUTPATIENT)
Dept: CARDIOLOGY | Facility: CLINIC | Age: 70
End: 2020-01-13
Payer: COMMERCIAL

## 2020-01-13 VITALS
SYSTOLIC BLOOD PRESSURE: 142 MMHG | HEIGHT: 71 IN | DIASTOLIC BLOOD PRESSURE: 82 MMHG | HEART RATE: 72 BPM | WEIGHT: 256 LBS | BODY MASS INDEX: 35.84 KG/M2

## 2020-01-13 PROCEDURE — 99214 OFFICE O/P EST MOD 30 MIN: CPT

## 2020-01-13 PROCEDURE — 93000 ELECTROCARDIOGRAM COMPLETE: CPT

## 2020-01-13 RX ORDER — OMEPRAZOLE 20 MG/1
TABLET, DELAYED RELEASE ORAL
Refills: 0 | Status: DISCONTINUED | COMMUNITY
End: 2020-01-13

## 2020-01-13 NOTE — PHYSICAL EXAM
[General Appearance - Well Nourished] : well nourished [No Deformities] : no deformities [Normal Appearance] : normal appearance [General Appearance - In No Acute Distress] : no acute distress [Normal Oral Mucosa] : normal oral mucosa [Normal Oropharynx] : normal oropharynx [Normal Conjunctiva] : the conjunctiva exhibited no abnormalities [] : no respiratory distress [FreeTextEntry1] : no JVD [Respiration, Rhythm And Depth] : normal respiratory rhythm and effort [Auscultation Breath Sounds / Voice Sounds] : lungs were clear to auscultation bilaterally [Heart Rate And Rhythm] : heart rate and rhythm were normal [Heart Sounds] : normal S1 and S2 [Systolic grade ___/6] : A grade [unfilled]/6 systolic murmur was heard. [Edema] : no peripheral edema present [Abdomen Soft] : soft [Bowel Sounds] : normal bowel sounds [Abdomen Tenderness] : non-tender [Abnormal Walk] : normal gait [Nail Clubbing] : no clubbing of the fingernails [Cyanosis, Localized] : no localized cyanosis [Skin Color & Pigmentation] : normal skin color and pigmentation [Affect] : the affect was normal [Oriented To Time, Place, And Person] : oriented to person, place, and time

## 2020-01-13 NOTE — ASSESSMENT
[FreeTextEntry1] : Chest pain - atypical.\par PVC's - isolated\par BP - controlled\par DL - on Crestor.\par ED - low risk for sexual activity\par Negative stress echo 05/2019. \par Cath report - non-obstructive 2015\par c/w primary prevention.\par weight loss discussed.\par Obtain 2D echo to assess cardiomegaly in the setting of LBBB. R/o cardiomyopathy.\par Return after the echo

## 2020-01-13 NOTE — HISTORY OF PRESENT ILLNESS
[FreeTextEntry1] : 68 y/o male with history of HTN, DL, non-obstructive CAD (cath in 2015 - mild plaque). Presents for f/u of occasional PVC's and discomfort in lower chest/epigastric area. No syncope. + constipation.  No new events. BP is controlled. Had a stress echo 5/14/19, which was negative for ischemia. Now with LBBB on ECG. No chest pain today. Had esophagram - noted to have cardiomegaly.

## 2020-01-16 ENCOUNTER — APPOINTMENT (OUTPATIENT)
Dept: UROLOGY | Facility: CLINIC | Age: 70
End: 2020-01-16
Payer: MEDICARE

## 2020-01-16 VITALS
HEIGHT: 71 IN | WEIGHT: 256 LBS | HEART RATE: 83 BPM | BODY MASS INDEX: 35.84 KG/M2 | DIASTOLIC BLOOD PRESSURE: 83 MMHG | SYSTOLIC BLOOD PRESSURE: 135 MMHG

## 2020-01-16 DIAGNOSIS — E28.0 ESTROGEN EXCESS: ICD-10-CM

## 2020-01-16 PROCEDURE — 99214 OFFICE O/P EST MOD 30 MIN: CPT | Mod: 25

## 2020-01-16 PROCEDURE — 51741 ELECTRO-UROFLOWMETRY FIRST: CPT

## 2020-01-16 PROCEDURE — 51798 US URINE CAPACITY MEASURE: CPT

## 2020-01-16 RX ORDER — DUTASTERIDE 0.5 MG/1
0.5 CAPSULE, LIQUID FILLED ORAL DAILY
Refills: 0 | Status: DISCONTINUED | COMMUNITY
End: 2020-01-16

## 2020-01-16 RX ORDER — ANASTROZOLE 1 MG/1
TABLET ORAL
Refills: 0 | Status: DISCONTINUED | COMMUNITY
End: 2020-01-16

## 2020-01-16 NOTE — ASSESSMENT
[FreeTextEntry1] : With respect to his erections his hormones are good enough at 69 and we will try him on PDE five inhibitors.\par \par With respect to his voiding urine was not done the ultrasound showed normal upper tracts the pre-void was 182 post void was two mL prostate was 75 g there was no wall thickening\par \par With respect to his prostate and PSA. He is declining Avodart will get a repeat PSA and see where it goes. With respect to his voiding I think he is voiding well enough I can’t tell if any of this is behavior we need a good record of his intake and output so we will ask him to do that again.\par \par \par With respect to his hormones are within don’t know what to say. His estradiol is not detectable he is not really clear how often is taking the Arimidex may be a half a pill every two days I would have him stop it completely get repeat blood when he gets the PSA and come back in for review\par \par With respect to his erectile dysfunction we will write him for sildenafil will give him a script for the 20 mg tablets will take anywhere from one up to five an hour before sex preferably on an empty stomach watching out for the side effects including but not limited to non-arterial ischemic optic neuropathy and tenderness if that works well but have side effects will consider Cialis if it doesn’t work well will consider other options\par

## 2020-01-16 NOTE — LETTER HEADER
[FreeTextEntry3] : Sherly Shepard M.D.\par Director of Urology\par [facility]\par [address] [place] [address], [address]\[place] [address]

## 2020-01-16 NOTE — LETTER BODY
[Consult Letter:] : I had the pleasure of evaluating your patient, [unfilled]. [Dear  ___] : Dear  [unfilled], [Please see my note below.] : Please see my note below. [Consult Closing:] : Thank you very much for allowing me to participate in the care of this patient.  If you have any questions, please do not hesitate to contact me. [Sincerely,] : Sincerely, [FreeTextEntry2] : Dr. Claudia Victor\par [address] [facility]\[place] [address]

## 2020-01-16 NOTE — HISTORY OF PRESENT ILLNESS
[FreeTextEntry1] : Braydon is 69 years old and was seen December 19 with an elevated PSA along with lower urinary tract symptoms, erectile dysfunction and low libido. We weren’t sure the PSA of 4.6 is he really wasn’t compliant with the Avodart and in February 2018 he had a PSA of 2.4 again we don’t know if he was on Avodart he had an MRI done that was a noncontrast study said there was no dynamic phase and we decided he would go back on the dutasteride take it religiously for three months get a repeat PSA and then decide what to do. \par \par Concerning his erectile dysfunction we order hormones especially since he’s been on the Arimidex in a noncompliant fashion and because of his cardiac history we want to [place] criteria classification before we would consider PDE five inhibitors\par \par Finally with respect to his lower urinary tract symptoms we wanted a renal bladder ultrasound urinalysis culture and cytology was post keep a voiding diary and we would consider a flow study.\par \par He comes in today telling me that he started the Avodart but then stopped it because he read that has a risk of prostate cancer and though I discussed with him that in his situation the risk-benefit is in favor more of taking it and that the prostate cancer risk is more theoretical and not everyone believes in it he doesn’t want to take the risk so he did not take it. With respect to his PSA that we will just order it again as he’s been off the Avodart for at least two weeks and will get it drawn and come back in to review it the next time he’s here.\par

## 2020-01-27 ENCOUNTER — APPOINTMENT (OUTPATIENT)
Dept: CARDIOLOGY | Facility: CLINIC | Age: 70
End: 2020-01-27
Payer: MEDICARE

## 2020-01-27 PROCEDURE — 93306 TTE W/DOPPLER COMPLETE: CPT

## 2020-02-10 ENCOUNTER — APPOINTMENT (OUTPATIENT)
Dept: UROLOGY | Facility: CLINIC | Age: 70
End: 2020-02-10
Payer: MEDICARE

## 2020-02-10 VITALS
DIASTOLIC BLOOD PRESSURE: 76 MMHG | SYSTOLIC BLOOD PRESSURE: 133 MMHG | BODY MASS INDEX: 35.84 KG/M2 | HEART RATE: 91 BPM | HEIGHT: 71 IN | WEIGHT: 256 LBS

## 2020-02-10 PROCEDURE — 99213 OFFICE O/P EST LOW 20 MIN: CPT

## 2020-02-10 RX ORDER — SILDENAFIL 20 MG/1
20 TABLET ORAL
Qty: 30 | Refills: 2 | Status: DISCONTINUED | OUTPATIENT
Start: 2020-01-16 | End: 2020-02-10

## 2020-03-03 ENCOUNTER — EMERGENCY (EMERGENCY)
Facility: HOSPITAL | Age: 70
LOS: 0 days | Discharge: LEFT BEFORE DISCHARGE | End: 2020-03-04
Attending: EMERGENCY MEDICINE | Admitting: EMERGENCY MEDICINE
Payer: MEDICARE

## 2020-03-03 VITALS
RESPIRATION RATE: 17 BRPM | OXYGEN SATURATION: 96 % | SYSTOLIC BLOOD PRESSURE: 139 MMHG | DIASTOLIC BLOOD PRESSURE: 75 MMHG | HEART RATE: 86 BPM | TEMPERATURE: 98 F

## 2020-03-03 DIAGNOSIS — K21.9 GASTRO-ESOPHAGEAL REFLUX DISEASE WITHOUT ESOPHAGITIS: ICD-10-CM

## 2020-03-03 DIAGNOSIS — N40.0 BENIGN PROSTATIC HYPERPLASIA WITHOUT LOWER URINARY TRACT SYMPTOMS: ICD-10-CM

## 2020-03-03 DIAGNOSIS — G89.29 OTHER CHRONIC PAIN: ICD-10-CM

## 2020-03-03 DIAGNOSIS — I10 ESSENTIAL (PRIMARY) HYPERTENSION: ICD-10-CM

## 2020-03-03 DIAGNOSIS — G47.33 OBSTRUCTIVE SLEEP APNEA (ADULT) (PEDIATRIC): ICD-10-CM

## 2020-03-03 DIAGNOSIS — Z82.49 FAMILY HISTORY OF ISCHEMIC HEART DISEASE AND OTHER DISEASES OF THE CIRCULATORY SYSTEM: ICD-10-CM

## 2020-03-03 DIAGNOSIS — R07.9 CHEST PAIN, UNSPECIFIED: ICD-10-CM

## 2020-03-03 DIAGNOSIS — N28.9 DISORDER OF KIDNEY AND URETER, UNSPECIFIED: ICD-10-CM

## 2020-03-03 DIAGNOSIS — H26.9 UNSPECIFIED CATARACT: Chronic | ICD-10-CM

## 2020-03-03 DIAGNOSIS — M54.9 DORSALGIA, UNSPECIFIED: ICD-10-CM

## 2020-03-03 DIAGNOSIS — R20.0 ANESTHESIA OF SKIN: ICD-10-CM

## 2020-03-03 DIAGNOSIS — F17.210 NICOTINE DEPENDENCE, CIGARETTES, UNCOMPLICATED: ICD-10-CM

## 2020-03-03 DIAGNOSIS — R20.2 PARESTHESIA OF SKIN: ICD-10-CM

## 2020-03-03 DIAGNOSIS — R06.02 SHORTNESS OF BREATH: ICD-10-CM

## 2020-03-03 DIAGNOSIS — E78.5 HYPERLIPIDEMIA, UNSPECIFIED: ICD-10-CM

## 2020-03-03 LAB
ALBUMIN SERPL ELPH-MCNC: 4.4 G/DL — SIGNIFICANT CHANGE UP (ref 3.5–5.2)
ALP SERPL-CCNC: 65 U/L — SIGNIFICANT CHANGE UP (ref 30–115)
ALT FLD-CCNC: 18 U/L — SIGNIFICANT CHANGE UP (ref 0–41)
ANION GAP SERPL CALC-SCNC: 13 MMOL/L — SIGNIFICANT CHANGE UP (ref 7–14)
AST SERPL-CCNC: 22 U/L — SIGNIFICANT CHANGE UP (ref 0–41)
BASOPHILS # BLD AUTO: 0.03 K/UL — SIGNIFICANT CHANGE UP (ref 0–0.2)
BASOPHILS NFR BLD AUTO: 0.6 % — SIGNIFICANT CHANGE UP (ref 0–1)
BILIRUB SERPL-MCNC: 0.4 MG/DL — SIGNIFICANT CHANGE UP (ref 0.2–1.2)
BUN SERPL-MCNC: 17 MG/DL — SIGNIFICANT CHANGE UP (ref 10–20)
CALCIUM SERPL-MCNC: 9.3 MG/DL — SIGNIFICANT CHANGE UP (ref 8.5–10.1)
CHLORIDE SERPL-SCNC: 101 MMOL/L — SIGNIFICANT CHANGE UP (ref 98–110)
CO2 SERPL-SCNC: 25 MMOL/L — SIGNIFICANT CHANGE UP (ref 17–32)
CREAT SERPL-MCNC: 1.4 MG/DL — SIGNIFICANT CHANGE UP (ref 0.7–1.5)
EOSINOPHIL # BLD AUTO: 0.14 K/UL — SIGNIFICANT CHANGE UP (ref 0–0.7)
EOSINOPHIL NFR BLD AUTO: 2.7 % — SIGNIFICANT CHANGE UP (ref 0–8)
GLUCOSE SERPL-MCNC: 155 MG/DL — HIGH (ref 70–99)
HCT VFR BLD CALC: 41.2 % — LOW (ref 42–52)
HGB BLD-MCNC: 13.7 G/DL — LOW (ref 14–18)
IMM GRANULOCYTES NFR BLD AUTO: 0 % — LOW (ref 0.1–0.3)
LYMPHOCYTES # BLD AUTO: 2.47 K/UL — SIGNIFICANT CHANGE UP (ref 1.2–3.4)
LYMPHOCYTES # BLD AUTO: 47.5 % — SIGNIFICANT CHANGE UP (ref 20.5–51.1)
MCHC RBC-ENTMCNC: 29.7 PG — SIGNIFICANT CHANGE UP (ref 27–31)
MCHC RBC-ENTMCNC: 33.3 G/DL — SIGNIFICANT CHANGE UP (ref 32–37)
MCV RBC AUTO: 89.2 FL — SIGNIFICANT CHANGE UP (ref 80–94)
MONOCYTES # BLD AUTO: 0.5 K/UL — SIGNIFICANT CHANGE UP (ref 0.1–0.6)
MONOCYTES NFR BLD AUTO: 9.6 % — HIGH (ref 1.7–9.3)
NEUTROPHILS # BLD AUTO: 2.06 K/UL — SIGNIFICANT CHANGE UP (ref 1.4–6.5)
NEUTROPHILS NFR BLD AUTO: 39.6 % — LOW (ref 42.2–75.2)
NRBC # BLD: 0 /100 WBCS — SIGNIFICANT CHANGE UP (ref 0–0)
PLATELET # BLD AUTO: 155 K/UL — SIGNIFICANT CHANGE UP (ref 130–400)
POTASSIUM SERPL-MCNC: 4.2 MMOL/L — SIGNIFICANT CHANGE UP (ref 3.5–5)
POTASSIUM SERPL-SCNC: 4.2 MMOL/L — SIGNIFICANT CHANGE UP (ref 3.5–5)
PROT SERPL-MCNC: 7 G/DL — SIGNIFICANT CHANGE UP (ref 6–8)
RBC # BLD: 4.62 M/UL — LOW (ref 4.7–6.1)
RBC # FLD: 12.1 % — SIGNIFICANT CHANGE UP (ref 11.5–14.5)
SODIUM SERPL-SCNC: 139 MMOL/L — SIGNIFICANT CHANGE UP (ref 135–146)
TROPONIN T SERPL-MCNC: <0.01 NG/ML — SIGNIFICANT CHANGE UP
TROPONIN T SERPL-MCNC: <0.01 NG/ML — SIGNIFICANT CHANGE UP
WBC # BLD: 5.2 K/UL — SIGNIFICANT CHANGE UP (ref 4.8–10.8)
WBC # FLD AUTO: 5.2 K/UL — SIGNIFICANT CHANGE UP (ref 4.8–10.8)

## 2020-03-03 PROCEDURE — 71045 X-RAY EXAM CHEST 1 VIEW: CPT | Mod: 26

## 2020-03-03 PROCEDURE — 99220: CPT

## 2020-03-03 PROCEDURE — 93010 ELECTROCARDIOGRAM REPORT: CPT

## 2020-03-03 PROCEDURE — 93010 ELECTROCARDIOGRAM REPORT: CPT | Mod: 77

## 2020-03-03 NOTE — ED ADULT NURSE REASSESSMENT NOTE - NS ED NURSE REASSESS COMMENT FT1
Pt received from main ED. Alert and oriented x 4, able to make needs known. Pt. denies any complaints of chest pain, dizziness or headache. Repeat Troponin sent to lab, results pending. CCTA pending to be done. Will continue to monitor.

## 2020-03-03 NOTE — ED CDU PROVIDER INITIAL DAY NOTE - MEDICAL DECISION MAKING DETAILS
68yo M history of HTN DL presenting with chest pain. Sent in by PMD for further eval. No DVT/PE risk factors. Pt currently asymptomatic with no complaints. labs ekg imaging reviewed. pending CCTA this AM.

## 2020-03-03 NOTE — ED CDU PROVIDER INITIAL DAY NOTE - OBJECTIVE STATEMENT
pt is a 69y male with pmhx of GERD, HTN, chronic back pain, BPH, HTN, sleep apnea, Renal disease, comes to the ed c/o CP that starts in the back and radiates around to the front assoc with slight SOB and numbness and tingling of left arm. pt was seen by his PMD Dr Sorensen and sent in for EKG changes and Left bundle branch. pt's cardiologist is Dr. Lopez and he had an echo done x 1 month ago with ef of 55-65%. pt is smoker and + family hx of heart problems. pt denies any nausea, vomiting, fever chills, abdominal pain, urinary sx, loc, dizziness.

## 2020-03-03 NOTE — ED PROVIDER NOTE - PROGRESS NOTE DETAILS
Attending Note: I personally evaluated the patient. I reviewed the Resident’s or Physician Assistant’s note (as assigned above), and agree with the findings and plan except as documented in my note.  68 yo M PMH HTN, hyperlipidemia, prior drug abuse several yrs ago heroin and cocaine intranasal p/w CP “squeezing and hook-like” to low chest radiating to mid chest. Moderate to severe in intensity, not worsened with breathing or movement of chest/UEs. No leg swelling, fever, chills, or sweats. Sent in by outpatient doctor for CCTA. Exam: NAD, NCAT, HEENT: mmm, EOMI, PERRLA, Neck: supple, nontender, nl ROM, Heart: RRR, no murmur, Extremities 2+ b/l pulses intact. Lungs: BCTA, no signs of increased WOB, Abd: NTND, no guarding or rebound, no hernia palpated, no CVAT. MSK: chest, back, and ext nontender, nl rom, no deformity. Neuro: A&Ox3, normal strength, nl sensation throughout, normal speech. A&P: Eval ACS vs other cause of exertional CP. If lab testing is negative pt agreeable for obs status for CCTA

## 2020-03-03 NOTE — ED PROVIDER NOTE - PMH
Arthritis    Back pain    Borderline diabetes    Enlarged heart    Enlarged prostate    GERD (gastroesophageal reflux disease)    HTN (hypertension)    Hypercholesteremia    Renal disease    Sleep apnea

## 2020-03-03 NOTE — ED PROVIDER NOTE - CLINICAL SUMMARY MEDICAL DECISION MAKING FREE TEXT BOX
pw chest pain, exertional, suspicion for unstable angina as source. HEART score moderate risk. CE negative. EKG nonischemic. Patient to be placed into observation status. There is a lack of diagnostic certainty, where a more precise diagnosis could decide inpatient admission or discharge to home, and/or need for therapeutic intensity, where extensive therapy has a reasonable possibility of abating the patient's presenting condition, and thereby prevents inpatient admission.

## 2020-03-03 NOTE — ED ADULT NURSE NOTE - NSFALLRSKASSESSTYPE_ED_ALL_ED
Psychiatry Discharge Summary        DSM- DIAGNOSIS:     (Axis I): Schizoaffective disorder ([address]   Polysubstance abuse  Axis II:  none  Axis III:  Past Medical History:   Diagnosis Date    Bipolar disorder ([address]     Depression     GERD (gastroesophageal reflux disease)     Hallucinations     Headache(784.0)     Hepatitis     Schizophrenia, schizo-affective (HCC)     Substance abuse     Tobacco abuse     Type II or unspecified type diabetes mellitus without mention of complication, not stated as uncontrolled     Urinary incontinence       Stressors (Axis IV):  Severity of stressors is moderate  Source of stressors:  health issues and other psychological and environmental problems        Procedures: none    Hospital Course and significant findings      Anastacia Rosen is a 60 y.o. male. was admitted for inpatient psychiatric treatment with symptoms including hopelessness,depressed mood and suicide ideations, racing thoughts and   mood swings. Patient admitted to - auditory hallucinations. During this hospitalization acute psychiatric symptoms were stabilized with supportive psychotherapy and medication management. Pt was also involved in therapeutic activities and psychotherapy  groups to improve coping skills. At the time of discharge, patient denied  suicidal and homicidal ideations,depressed mood and hopelessness, no evidence of delusions. Patient denied - auditory and visual hallucinations. MENTAL STATUS EXAMINATION AT TIME OF DISCHARGE    Behavior and Cooperation: Cooperative  Appearance: stated age     Eye Contact: good  Mood:  stable,  Thought process Organized  Suicide ideation: Denies  Homicide ideation: Denies  Orientation: person, place, time , Memory: recent and remote memory intact  Judgement: fair.     Plan:       Medication List      START taking these medications    buPROPion 75 MG tablet  Commonly known as:  WELLBUTRIN  Take 1 tablet by mouth 2 times daily  Notes to [address], [address]  Phone: [phone]   Please call at least 24 hours in advance to reschedule or cancel your appointments    Go on 3/2/2018  Madi Kimball, you have an appointment on Friday, Mar. 2nd at 2:00pm with nurse Sherita Tomas for your [place] shot.     discharge PT by cab to:   [facility], [address] [place] [address]          Chuck Fields MD  Psychiatrist Initial (On Arrival)

## 2020-03-03 NOTE — ED PROVIDER NOTE - OBJECTIVE STATEMENT
69y M pmh as listed presents for eval of cp. Pt has 1wk of hook like chest pain, aggravated with activity, no relieving factors. Echo x1mo ago with EF 55-65%.

## 2020-03-03 NOTE — ED PROVIDER NOTE - PHYSICAL EXAMINATION
CONST: NAD  EYES: Sclera and conjunctiva clear.   ENT: No nasal discharge. Oropharynx normal appearing, no erythema or exudates. No abscess or swelling. Uvula midline.   NECK: Non-tender, no meningeal signs. normal ROM. supple   CARD: S1 S2; No jvd  RESP: Equal BS B/L, No wheezes, rhonchi or rales. No distress  GI: non-tender, non-distended. no cva tenderness. normal BS  MS: Normal ROM in all extremities. pulses 2 +. no calf tenderness or swelling  SKIN: Warm, dry, no acute rashes. Good turgor  NEURO: A&Ox4

## 2020-03-03 NOTE — ED PROVIDER NOTE - NS ED ROS FT
Constitutional: (-) fever  Eyes/ENT: (-) blurry vision, (-) epistaxis  Cardiovascular: (+) chest pain, (-) syncope  Respiratory: (-) cough, (-) shortness of breath  Gastrointestinal: (-) vomiting, (-) diarrhea  : (-) dysuria, (-) hematuria  Musculoskeletal: (-) neck pain, (-) back pain, (-) joint pain  Integumentary: (-) rash, (-) edema  Neurological: (-) headache, (-) altered mental status  Allergic/Immunologic: (-) pruritus

## 2020-03-03 NOTE — ED ADULT NURSE NOTE - OBJECTIVE STATEMENT
Pt presents with midsternal chest pain that wraps around his chest. Pts abdomen is hard and distended pt states he has been having a small amount of diarrhea lately.

## 2020-03-04 VITALS
DIASTOLIC BLOOD PRESSURE: 88 MMHG | TEMPERATURE: 96 F | RESPIRATION RATE: 18 BRPM | HEART RATE: 69 BPM | OXYGEN SATURATION: 97 % | SYSTOLIC BLOOD PRESSURE: 166 MMHG

## 2020-03-04 PROCEDURE — 99217: CPT

## 2020-03-04 RX ORDER — METOPROLOL TARTRATE 50 MG
50 TABLET ORAL ONCE
Refills: 0 | Status: COMPLETED | OUTPATIENT
Start: 2020-03-04 | End: 2020-03-04

## 2020-03-04 RX ORDER — METOPROLOL TARTRATE 50 MG
100 TABLET ORAL ONCE
Refills: 0 | Status: COMPLETED | OUTPATIENT
Start: 2020-03-04 | End: 2020-03-04

## 2020-03-04 RX ADMIN — Medication 100 MILLIGRAM(S): at 08:14

## 2020-03-04 RX ADMIN — Medication 50 MILLIGRAM(S): at 06:37

## 2020-03-04 NOTE — ED CDU PROVIDER DISPOSITION NOTE - PATIENT PORTAL LINK FT
You can access the FollowMyHealth Patient Portal offered by [facility] by registering at the following website: [email or web address]. By joining [employer]’s FollowMyHealth portal, you will also be able to view your health information using other applications (apps) compatible with our system.

## 2020-03-04 NOTE — ED ADULT NURSE REASSESSMENT NOTE - NS ED NURSE REASSESS COMMENT FT1
Pt is alert and oriented x4, steady gait. VSS, NAD. Pt waiting to go for CCTA. Callbell within reach. Safety precautions maintained. Will continue to monitor.

## 2020-03-04 NOTE — ED CDU PROVIDER DISPOSITION NOTE - NSFOLLOWUPINSTRUCTIONS_ED_ALL_ED_FT
Follow up with your PMD in 1 week - Dr Sorensen  Follow up with your Cardiologist in 1 week - Dr Lopez  Continue all your home medications  Return to the ED if your symptoms worsen/return

## 2020-03-04 NOTE — ED ADULT NURSE REASSESSMENT NOTE - NS ED NURSE REASSESS COMMENT FT1
Pt resting comfortably in bed. Continues on cardiac monitoring. Denies any complaints of chest pain or dizziness at current time. Troponin negative x 2. CCTA to be done. Will continue to monitor.

## 2020-03-04 NOTE — ED CDU PROVIDER SUBSEQUENT DAY NOTE - HISTORY
Pt seen at bedside, NAD. Arrived overnight, received from FATOU [place]. Pt presenting for chest pain, starting in the back radiating to the front. Pain associated with SOB and numbness/tingling in the left arm. Cardiologist - Dr Lopez. Cardiac enzymes negative x 2. Pt scheduled for CCTA this am.

## 2020-03-04 NOTE — ED CDU PROVIDER DISPOSITION NOTE - CARE PROVIDER_API CALL
Timmy Lopez (MD)  Cardiovascular Disease; Internal Medicine; Interventional Cardiology  [address] [facility], [place] [address]  Phone: [phone]  Fax: [phone]  Follow Up Time: 4-6 Days    Rickie Sorensen)  Internal Medicine  [address] [place] [address]  Phone: [phone]  Fax: [phone]  Follow Up Time: 4-6 Days

## 2020-03-04 NOTE — ED CDU PROVIDER DISPOSITION NOTE - CLINICAL COURSE
68yo M history of HTN DL presenting with chest pain. Sent in by PMD for further eval. No DVT/PE risk factors. Pt currently asymptomatic. labs ekg imaging reviewed. Discussed and given results of all testing thus far with patient. Pt refusing to stay for CCTA at this time stating that he has been waiting "too long," despite transport being at bedside to take him to CT now. Pt then wanted to be taken to CT. At CT changed his mind and wanted to sign out AMA. We had an extensive discussion of the risks and benefits of pursuing further medical evaluation. Patient is choosing to leave against medical advice. Patient is awake, alert, oriented, and demonstrates full capacity and insight into their illness and situation. Patient aware that they may return at any time for further care or for new and/or concerning symptoms. Patient verbalizes understanding of all of the above and has no further questions or concerns at this time.

## 2020-03-04 NOTE — ED CDU PROVIDER DISPOSITION NOTE - PROVIDER TOKENS
PROVIDER:[TOKEN:[[id number]],FOLLOWUP:[4-6 Days]],PROVIDER:[TOKEN:[[id number]],FOLLOWUP:[4-6 Days]]

## 2020-03-04 NOTE — ED CDU PROVIDER SUBSEQUENT DAY NOTE - MEDICAL DECISION MAKING DETAILS
70yo M history of HTN DL presenting with chest pain. Sent in by PMD for further eval. No DVT/PE risk factors. Pt currently asymptomatic with no complaints. labs ekg imaging reviewed. pending CCTA this AM.

## 2020-03-16 ENCOUNTER — APPOINTMENT (OUTPATIENT)
Dept: UROLOGY | Facility: CLINIC | Age: 70
End: 2020-03-16

## 2020-03-24 ENCOUNTER — OUTPATIENT (OUTPATIENT)
Dept: OUTPATIENT SERVICES | Facility: HOSPITAL | Age: 70
LOS: 1 days | Discharge: HOME | End: 2020-03-24
Payer: MEDICARE

## 2020-03-24 ENCOUNTER — RESULT REVIEW (OUTPATIENT)
Age: 70
End: 2020-03-24

## 2020-03-24 DIAGNOSIS — R97.20 ELEVATED PROSTATE SPECIFIC ANTIGEN [PSA]: ICD-10-CM

## 2020-03-24 DIAGNOSIS — H26.9 UNSPECIFIED CATARACT: Chronic | ICD-10-CM

## 2020-03-24 PROCEDURE — 72197 MRI PELVIS W/O & W/DYE: CPT | Mod: 26

## 2020-04-13 ENCOUNTER — APPOINTMENT (OUTPATIENT)
Dept: UROLOGY | Facility: CLINIC | Age: 70
End: 2020-04-13
Payer: MEDICARE

## 2020-04-13 DIAGNOSIS — R68.82 DECREASED LIBIDO: ICD-10-CM

## 2020-04-13 PROCEDURE — 99215 OFFICE O/P EST HI 40 MIN: CPT | Mod: 95

## 2020-04-13 NOTE — HISTORY OF PRESENT ILLNESS
[Home] : at home, [unfilled] , at the time of the visit. [Medical Office: ([facility])___] : at ~his/her~ medical office located in [place] [Other:____] : [unfilled] [Patient] : the patient [Self] : self [Urinary Urgency] : urinary urgency [Urinary Frequency] : urinary frequency [Nocturia] : nocturia [Erectile Dysfunction] : Erectile Dysfunction [FreeTextEntry1] : Braydon is 70 years old his PSA after he had been off Avodart for a while had gone up to 5.2 with a free of 12% we sent him for a prostate MR this time done with and without contrast agents and he was supposed to follow up with Dr. Upton.\par \par With respect to his erectile dysfunction is free testosterone was borderline low with 37.6 as was the bioavailable at 76 with the total testosterone acceptable at 415. Until we know it’s doing with his prostate did not want to consider treating his relative hypogonadism. As well the estradiol was 19 suffice were no place for Arimidex.\par \par With respect to his voiding dysfunction he is on Rapaflo and has decided he is happy enough with that were not consider anything else.\par

## 2020-04-13 NOTE — LETTER BODY
[Dear  ___] : Dear  [unfilled], [Please see my note below.] : Please see my note below. [Sincerely,] : Sincerely, [Courtesy Letter:] : I had the pleasure of seeing your patient, [unfilled], in my office today. [FreeTextEntry2] : Dr. Claudia Victor\par [address] [facility]\[place] [address]

## 2020-04-13 NOTE — PHYSICAL EXAM
[General Appearance - Well Nourished] : well nourished [General Appearance - Well Developed] : well developed [Well Groomed] : well groomed [General Appearance - In No Acute Distress] : no acute distress [Normal Appearance] : normal appearance [Edema] : no peripheral edema [Exaggerated Use Of Accessory Muscles For Inspiration] : no accessory muscle use [] : no respiratory distress [Respiration, Rhythm And Depth] : normal respiratory rhythm and effort [Oriented To Time, Place, And Person] : oriented to person, place, and time [Affect] : the affect was normal [Not Anxious] : not anxious [Mood] : the mood was normal [Normal Station and Gait] : the gait and station were normal for the patient's age [No Focal Deficits] : no focal deficits [FreeTextEntry1] : Obese

## 2020-04-13 NOTE — ASSESSMENT
[FreeTextEntry1] : His PSA is 5.2 with a 12% free fraction and elevated. All options were reviewed and he is electing to get a prostate MRI and follow up with Dr. Upton our cancer specialist for further evaluation.\par \par Concerning his hormones, they are within normal limits by age criteria though the free and bioavailable are low by general criteria. For now he will continue off of Arimidex and we wont consider treatment to raise his free / bioavailable until we know the prostate cancer risk. \par He has not been cleared by his PCP to start Viagra and will not begin any PDE 5 inhibitors until he is.\par \par His happy with the way he is voiding and will continue Rapaflo p.o. q. daily\par

## 2020-04-13 NOTE — LETTER HEADER
[FreeTextEntry3] : Sherly Shepard M.D.\par Director of Urology\par [facility]\par [address] [place] [address], [address]\[place] [address]

## 2020-04-13 NOTE — ASSESSMENT
[FreeTextEntry1] : After obtaining verbal consent with the patient reading the consent form and agreeing to it we reviewed what we had done to date. \par \par He is accepting of the way he is urinating and still wants to do nothing more than Rapaflo. \par \par With respect to his erections he understands that we need a note from Dr. Lopez but that is not a prime importance to him if and when he can get it done he will and get us the information.\par \par With respect to his hormones he understands that they are acceptable by the prevalence levels i.e. many patients his age have this testosterone level but not what we would consider healthy levels. However treatment has its own risk and since the major purpose of checking his hormones was to help his erections and right now he’s not concentrating on his erections we will wait and follow that over time. This is especially so as if we start treating and raising his testosterone it may activate any indolent cancer he has no current theory argues against that.\par The major concern was prostate cancer. I explained to him that the report from his MRI- PIRADS II, does not mean that he does not have more that he will not developed prostate cancer all it means is that at present by radiologic criteria the risk of a biopsy outweighs the potential benefit. We will need to follow his PSA and if he continues to go up we will have him see our cancer specialist.

## 2020-04-13 NOTE — LETTER BODY
[Dear  ___] : Dear  [unfilled], [Courtesy Letter:] : I had the pleasure of seeing your patient, [unfilled], in my office today. [Please see my note below.] : Please see my note below. [Sincerely,] : Sincerely, [FreeTextEntry2] : Dr. Claudia Victor\par [address] [facility]\[place] [address]

## 2020-04-13 NOTE — HISTORY OF PRESENT ILLNESS
[Urinary Frequency] : urinary frequency [Nocturia] : nocturia [Erectile Dysfunction] : Erectile Dysfunction [FreeTextEntry1] : Braydon is 69 years old Male who we are following for elevated PSA, BPH with lower urinary tract symptoms, erectile dysfunction, and decreased libido.\par \par He has a history of elevated PSA and had a noncontrast MRI done in October 2019 which was unremarkable however, he understands that this MRI was not multi-parametric and therefore not optimal for finding and/or ruling out prostate cancer.\par \par His prior PSA was 4.6, he was taking but not completely compliant on Avodart, and he discontinued completely. He presented to review his PSA testing.\par \par Additionally he was managed on Arimidex for elevated estradiol and decreased testosterone. Again, he was noncompliant with his medications. We urged him to discontinue them is taking more one day skipping a long time been taking a lot of time is not physiologic and potentially dangerous. He presents today to review his blood work.\par \dorothea Has not yet been cleared by his PCP to begin PDE 5 inhibitors.\par \par He is managed on Rapaflo and says that he is now doing well enough. He presents to review his voiding diary.

## 2020-04-13 NOTE — LETTER HEADER
[FreeTextEntry3] : Sherly Shepard M.D.\par Director of Urology\par [facility]\par [address] [place] [address], [address]\[place] [address]

## 2020-04-13 NOTE — PHYSICAL EXAM
[General Appearance - Well Developed] : well developed [General Appearance - Well Nourished] : well nourished [Normal Appearance] : normal appearance [Well Groomed] : well groomed [General Appearance - In No Acute Distress] : no acute distress [] : no respiratory distress [Respiration, Rhythm And Depth] : normal respiratory rhythm and effort [Exaggerated Use Of Accessory Muscles For Inspiration] : no accessory muscle use [Oriented To Time, Place, And Person] : oriented to person, place, and time [Affect] : the affect was normal [Mood] : the mood was normal [Not Anxious] : not anxious [FreeTextEntry1] : obese as seen via videa

## 2020-04-13 NOTE — ADDENDUM
[FreeTextEntry1] : The patient-doctor relationship has been established in a face to face fashion via real time video/audio HIPAA compliant communication using telemedicine software.  The patient's identity has been confirmed.  The patient was previously emailed a copy of the telemedicine consent.  They have had a chance to review and has now given verbal consent and has requested care to be assessed and treated through telemedicine and understands there maybe limitations in this process and they may need further follow up care in the office and or hospital settings.   \par

## 2020-05-21 ENCOUNTER — APPOINTMENT (OUTPATIENT)
Dept: UROLOGY | Facility: CLINIC | Age: 70
End: 2020-05-21

## 2020-06-15 ENCOUNTER — APPOINTMENT (OUTPATIENT)
Dept: CARDIOLOGY | Facility: CLINIC | Age: 70
End: 2020-06-15
Payer: MEDICARE

## 2020-06-15 VITALS
WEIGHT: 252 LBS | BODY MASS INDEX: 35.28 KG/M2 | DIASTOLIC BLOOD PRESSURE: 82 MMHG | SYSTOLIC BLOOD PRESSURE: 146 MMHG | HEIGHT: 71 IN | TEMPERATURE: 97.2 F | HEART RATE: 97 BPM

## 2020-06-15 PROCEDURE — 93000 ELECTROCARDIOGRAM COMPLETE: CPT

## 2020-06-15 PROCEDURE — 99213 OFFICE O/P EST LOW 20 MIN: CPT

## 2020-06-15 NOTE — PHYSICAL EXAM
[Normal Appearance] : normal appearance [General Appearance - Well Nourished] : well nourished [No Deformities] : no deformities [General Appearance - In No Acute Distress] : no acute distress [Normal Oropharynx] : normal oropharynx [Normal Conjunctiva] : the conjunctiva exhibited no abnormalities [Normal Oral Mucosa] : normal oral mucosa [FreeTextEntry1] : no JVD [] : no respiratory distress [Auscultation Breath Sounds / Voice Sounds] : lungs were clear to auscultation bilaterally [Respiration, Rhythm And Depth] : normal respiratory rhythm and effort [Heart Rate And Rhythm] : heart rate and rhythm were normal [Heart Sounds] : normal S1 and S2 [Edema] : no peripheral edema present [Systolic grade ___/6] : A grade [unfilled]/6 systolic murmur was heard. [Abdomen Tenderness] : non-tender [Abdomen Soft] : soft [Bowel Sounds] : normal bowel sounds [Abnormal Walk] : normal gait [Nail Clubbing] : no clubbing of the fingernails [Skin Color & Pigmentation] : normal skin color and pigmentation [Oriented To Time, Place, And Person] : oriented to person, place, and time [Cyanosis, Localized] : no localized cyanosis [Affect] : the affect was normal

## 2020-06-15 NOTE — HISTORY OF PRESENT ILLNESS
[FreeTextEntry1] : 69 y/o male with history of HTN, DL, non-obstructive CAD (cath in 2015 - mild plaque). Presents for f/u of occasional PVC's and discomfort in lower chest/epigastric area. Now with RUQ pains, mild, post-prandial. No chest pain. No syncope. Gained weight. BP is elevated today - usually controlled. Had a stress echo 5/14/19, which was negative for ischemia. Old LBBB on ECG. 2d echo noted - normal LV function.

## 2020-06-29 ENCOUNTER — APPOINTMENT (OUTPATIENT)
Dept: UROLOGY | Facility: CLINIC | Age: 70
End: 2020-06-29
Payer: MEDICARE

## 2020-06-29 VITALS
WEIGHT: 252 LBS | TEMPERATURE: 97.9 F | HEART RATE: 90 BPM | DIASTOLIC BLOOD PRESSURE: 87 MMHG | BODY MASS INDEX: 35.28 KG/M2 | SYSTOLIC BLOOD PRESSURE: 146 MMHG | HEIGHT: 71 IN

## 2020-06-29 PROCEDURE — 99213 OFFICE O/P EST LOW 20 MIN: CPT

## 2020-06-29 NOTE — LETTER BODY
[Dear  ___] : Dear  [unfilled], [Please see my note below.] : Please see my note below. [Courtesy Letter:] : I had the pleasure of seeing your patient, [unfilled], in my office today. [Sincerely,] : Sincerely, [FreeTextEntry2] : Rickie Sorensen MD\par [address]\[place] [address]\par

## 2020-06-29 NOTE — HISTORY OF PRESENT ILLNESS
[Urinary Incontinence] : urinary incontinence [Urinary Urgency] : urinary urgency [Nocturia] : nocturia [Straining] : straining [Weak Stream] : weak stream [Erectile Dysfunction] : Erectile Dysfunction [FreeTextEntry1] : Braydon is a 70-year-old black male who had an MRI that was equal to a PIRADS-II was supposed to get repeat PSA in come in. He comes in today telling us that he never got slips and had trouble communicating with the office.\par \par In addition his low urinary tract symptoms which he says were okay on Rapaflo have gotten worse with occasional urge incontinence unless he stays close to home and your bathroom.\par \par Finally for about four or five weeks especially postprandial he has discomfort under his rib cage deep inside more on the right side than the left he has not spoken to his primary care doctor about it.\par

## 2020-06-29 NOTE — LETTER HEADER
[FreeTextEntry3] : Sherly Shepard M.D.\par Director of Urology\par [facility]\par [address] [place] [address], [address]\[place] [address]

## 2020-06-29 NOTE — ASSESSMENT
[FreeTextEntry1] : Physical exam shows no abdominal tenderness to palpation even with him taking a deep breath. The cardiopulmonary exam was acceptable, there is no tenderness to the rib cage or the spine\par \par With respect to the PSA we will get a repeat if it is elevated further he will see Dr. Upton.\par \par With respect to his right upper quadrant discomfort he will speak to his primary care doctor and see if he needs an evaluation with respect to his gallbladder or whatever else may be contributing to this this is not an area in which I have expertise.\par \par With respect to his voiding dysfunction all have and keep a record of his intake and output will reviewed what he comes back and consider a flow study. If we do perform a flow study and it is poor will consider urodynamics\par \par

## 2020-06-29 NOTE — PHYSICAL EXAM
[General Appearance - Well Developed] : well developed [General Appearance - Well Nourished] : well nourished [Normal Appearance] : normal appearance [Well Groomed] : well groomed [General Appearance - In No Acute Distress] : no acute distress [Abdomen Soft] : soft [Costovertebral Angle Tenderness] : no ~M costovertebral angle tenderness [Abdomen Tenderness] : non-tender [Heart Rate And Rhythm] : Heart rate and rhythm were normal [] : no respiratory distress [Respiration, Rhythm And Depth] : normal respiratory rhythm and effort [Auscultation Breath Sounds / Voice Sounds] : lungs were clear to auscultation bilaterally [Exaggerated Use Of Accessory Muscles For Inspiration] : no accessory muscle use [Affect] : the affect was normal [Mood] : the mood was normal [Oriented To Time, Place, And Person] : oriented to person, place, and time [Normal Station and Gait] : the gait and station were normal for the patient's age [Not Anxious] : not anxious [FreeTextEntry1] : mild edema

## 2020-07-22 ENCOUNTER — APPOINTMENT (OUTPATIENT)
Dept: UROLOGY | Facility: CLINIC | Age: 70
End: 2020-07-22
Payer: MEDICARE

## 2020-07-22 VITALS
DIASTOLIC BLOOD PRESSURE: 79 MMHG | HEART RATE: 79 BPM | HEIGHT: 71 IN | WEIGHT: 250 LBS | TEMPERATURE: 97.8 F | BODY MASS INDEX: 35 KG/M2 | SYSTOLIC BLOOD PRESSURE: 130 MMHG

## 2020-07-22 PROCEDURE — 51741 ELECTRO-UROFLOWMETRY FIRST: CPT

## 2020-07-22 PROCEDURE — 51798 US URINE CAPACITY MEASURE: CPT

## 2020-07-22 PROCEDURE — 99214 OFFICE O/P EST MOD 30 MIN: CPT | Mod: 25

## 2020-07-22 RX ORDER — DIAZEPAM 5 MG/1
5 TABLET ORAL
Qty: 1 | Refills: 0 | Status: COMPLETED | COMMUNITY
Start: 2020-02-13 | End: 2020-07-22

## 2020-07-22 NOTE — LETTER BODY
[Dear  ___] : Dear  [unfilled], [Courtesy Letter:] : I had the pleasure of seeing your patient, [unfilled], in my office today. [Please see my note below.] : Please see my note below. [Sincerely,] : Sincerely, [FreeTextEntry2] : Rickie Sorensen MD\par [address]\[place] [address]\par

## 2020-07-22 NOTE — LETTER HEADER
[FreeTextEntry3] : Sherly Shepard M.D.\par Director of Urology\par [facility]\par [address] [place] [address], [address]\[place] [address]

## 2020-07-22 NOTE — ASSESSMENT
[FreeTextEntry1] : With respect to the elevated PSA he had acceptable MRI but the PSA is rising but then again he is 70 years old I will have him see our cancer specialist Dr. Upton and we will see what he has to say\par \par With respect to his voiding it’s not great then he may have some element of obstruction but is not willing to consider any intervention other than the Rapaflo and we are going to sent him to Dr. Upton to evaluate him for prostate cancer which if present may need treatment so for now we will hold.\par \par We can do with respect to his voiding as some of this looks like cardiac nocturia is try behavior modification. In asking to elevate his legs in the late evening before he goes to bed to try mobilize the fluid and he’ll try and switch the time that he drinks fluid from late evening early night to earlier in the day at most late evening and see if that helps.\par \par With respect to his erections there is a note from his cardiologist Dr. Lopez that there was a negative stress test echo right now Braydon says he’s doing well enough doesn’t want intervention. He did note that when he is having sex he gets paid by his waist and that is probably more musculoskeletal\par

## 2020-07-22 NOTE — HISTORY OF PRESENT ILLNESS
[Urinary Incontinence] : urinary incontinence [Urinary Urgency] : urinary urgency [Nocturia] : nocturia [Straining] : straining [Weak Stream] : weak stream [Erectile Dysfunction] : Erectile Dysfunction [FreeTextEntry1] : Braydon is a 70-year-old black male we are following for an elevated PSA with the prior MRI that was PIRADS II. He was last seen on June 29 and was brought back in because he never got the PSA done. In addition his low urinary tract symptoms which didn’t bother him acted up and though he still on the Rapaflo he said he is very unhappy. We asked them to keep a record of his intake and output we would consider a flow study and we will review the PSA making him a new set of slips.\par He tells me that he is still not happy with his voiding and though it was not as bad as it was when he last saw me it still bothersome\par

## 2020-07-22 NOTE — PHYSICAL EXAM
[General Appearance - Well Developed] : well developed [General Appearance - Well Nourished] : well nourished [Normal Appearance] : normal appearance [Well Groomed] : well groomed [General Appearance - In No Acute Distress] : no acute distress [] : no respiratory distress [Respiration, Rhythm And Depth] : normal respiratory rhythm and effort [Exaggerated Use Of Accessory Muscles For Inspiration] : no accessory muscle use [Oriented To Time, Place, And Person] : oriented to person, place, and time [Affect] : the affect was normal [Mood] : the mood was normal [Not Anxious] : not anxious [Normal Station and Gait] : the gait and station were normal for the patient's age [FreeTextEntry1] : obese

## 2020-08-03 ENCOUNTER — APPOINTMENT (OUTPATIENT)
Dept: UROLOGY | Facility: CLINIC | Age: 70
End: 2020-08-03
Payer: MEDICARE

## 2020-08-03 PROCEDURE — 99213 OFFICE O/P EST LOW 20 MIN: CPT

## 2020-08-03 NOTE — HISTORY OF PRESENT ILLNESS
[Urinary Frequency] : urinary frequency [Dysuria] : dysuria [FreeTextEntry1] : 71 yo with elevated PSA \par on rapaflo \par \par PSA 7/2020\par 6.1 with 11%\par \par PSA 1/2020\par 5.2 with 12%\par \par PSA 12/2019\par 4.8 \par \par MRI 3/26/2020\par PIRADS 2 \par 58 grams prostate

## 2020-08-03 NOTE — PROCEDURE
[Urgency/Frequency] : urgency/frequency [Other ___] : [unfilled] [Allergies Reviewed] : Allergies reviewed [None] : none [Strong Urge to Void] : with a strong urge to void [Qmax mL/s ___] : Qmax mL/s [unfilled] [Qavg mL/s ___] : Qavg mL/s [unfilled] [Voided volume ___ mL] : voided volume [unfilled] mL [Post void residual ___] : post void residual [unfilled] mL [Decreased] : decreased [Plateaued] : plateaued [Normal] : Normal [Unable to Assess] : I am unable to assess if patient has bladder outlet obstruction [Tolerated Well] : the patient tolerated the procedure well [No] :  no complications

## 2020-08-03 NOTE — LETTER HEADER
[FreeTextEntry3] : Sherly Shepard M.D.\par Director of Urology\par [facility]\par [address] [place] [address], [address]\[place] [address]

## 2020-08-03 NOTE — ASSESSMENT
[FreeTextEntry1] : 69 yo with elevated PSA\par the patient is here to discuss a biopsy\par the PSA continues to rise \par \par I discussed the 4K \par I explained the MRI findings and lack of abnormal findings\par I explained the relevance of a normal MRI\par I explained the relevance of an elevated PSA\par \par all questions answered\par will obtain 4k and f/u in 2 weeks

## 2020-08-03 NOTE — PHYSICAL EXAM
[General Appearance - Well Developed] : well developed [Well Groomed] : well groomed [General Appearance - Well Nourished] : well nourished [General Appearance - In No Acute Distress] : no acute distress [Normal Appearance] : normal appearance [Abdomen Soft] : soft [Abdomen Tenderness] : non-tender [Costovertebral Angle Tenderness] : no ~M costovertebral angle tenderness [Urethral Meatus] : meatus normal [Scrotum] : the scrotum was normal [Urinary Bladder Findings] : the bladder was normal on palpation [No Prostate Nodules] : no prostate nodules [Testes Mass (___cm)] : there were no testicular masses [Respiration, Rhythm And Depth] : normal respiratory rhythm and effort [] : no respiratory distress [Edema] : no peripheral edema [Exaggerated Use Of Accessory Muscles For Inspiration] : no accessory muscle use [Oriented To Time, Place, And Person] : oriented to person, place, and time [Mood] : the mood was normal [Not Anxious] : not anxious [Affect] : the affect was normal [Normal Station and Gait] : the gait and station were normal for the patient's age [No Palpable Adenopathy] : no palpable adenopathy [No Focal Deficits] : no focal deficits

## 2020-08-03 NOTE — LETTER BODY
[Courtesy Letter:] : I had the pleasure of seeing your patient, [unfilled], in my office today. [Please see my note below.] : Please see my note below. [Dear  ___] : Dear  [unfilled], [Sincerely,] : Sincerely, [FreeTextEntry2] : Rickie Sorensen MD\par [address]\[place] [address]\par

## 2020-08-03 NOTE — DISCUSSION/SUMMARY
[FreeTextEntry1] : His initial flow was much better but he had a very long tail and there appeared to be some Valsalva assistance throughout\par Please see today’s general note for more complete discussion of today’s visit\par

## 2020-08-06 ENCOUNTER — APPOINTMENT (OUTPATIENT)
Dept: VASCULAR SURGERY | Facility: CLINIC | Age: 70
End: 2020-08-06
Payer: MEDICARE

## 2020-08-06 VITALS
WEIGHT: 250 LBS | SYSTOLIC BLOOD PRESSURE: 143 MMHG | HEIGHT: 71 IN | TEMPERATURE: 97.2 F | HEART RATE: 93 BPM | DIASTOLIC BLOOD PRESSURE: 72 MMHG | BODY MASS INDEX: 35 KG/M2

## 2020-08-06 DIAGNOSIS — I70.213 ATHEROSCLEROSIS OF NATIVE ARTERIES OF EXTREMITIES WITH INTERMITTENT CLAUDICATION, BILATERAL LEGS: ICD-10-CM

## 2020-08-06 PROCEDURE — 93925 LOWER EXTREMITY STUDY: CPT

## 2020-08-06 PROCEDURE — 99203 OFFICE O/P NEW LOW 30 MIN: CPT

## 2020-08-06 NOTE — DATA REVIEWED
[FreeTextEntry1] : I performed an arterial duplex which was medically necessary to evaluate for arterial insufficiency. It showed patent femoral popliteal arteries bilaterally.\par

## 2020-08-06 NOTE — ASSESSMENT
[FreeTextEntry1] : 69 y/o gentleman presents with symptoms of pain in the lower extremities, cannot ambulate more than one block at a time due to "stabbing" leg pain, c/o lower back pain, pain after sitting for prolonged periods. He also c/o lower back pain and numbness and tingling in the legs and feet constantly.\par I performed an arterial duplex which was medically necessary to evaluate for arterial insufficiency. It showed patent femoral popliteal arteries bilaterally.\par He has palpable pulses in the lower extremities and his symptoms are referred from his lower back. No vascular surgical intervention is needed.

## 2020-08-06 NOTE — HISTORY OF PRESENT ILLNESS
[FreeTextEntry1] : 71 y/o gentleman presents with symptoms of pain in the lower extremities, cannot ambulate more than one block at a time due to "stabbing" leg pain, c/o lower back pain, pain after sitting for prolonged periods. He also c/o lower back pain.

## 2020-08-11 LAB
4K SCORE CALCULATION: 63 %
FREE PSA: 0.87 NG/ML
PERCENT FREE PSA: 12 %
TOTAL PSA: 7.15 NG/ML

## 2020-08-17 ENCOUNTER — APPOINTMENT (OUTPATIENT)
Dept: UROLOGY | Facility: CLINIC | Age: 70
End: 2020-08-17
Payer: MEDICARE

## 2020-08-17 VITALS — WEIGHT: 250 LBS | HEIGHT: 71 IN | TEMPERATURE: 97.2 F | BODY MASS INDEX: 35 KG/M2

## 2020-08-17 PROCEDURE — 99214 OFFICE O/P EST MOD 30 MIN: CPT

## 2020-08-17 NOTE — LETTER HEADER
[FreeTextEntry3] : Sherly Shepard M.D.\par Director of Urology\par [facility]\par [address] [place] [address], [address]\[place] [address]

## 2020-08-17 NOTE — ASSESSMENT
[FreeTextEntry1] : 71 yo with elevated PSA, elevated risk 4K\par the patient is here to discuss a biopsy\par \par I discussed the 4K in detail\par I explained the MRI findings and lack of abnormal findings\par \par I discussed the risks and benefits of a prostate biopsy and the manner in which biopsies are performed\par I explained the benefit of a transperineal vs transrectal biopsy\par explained the lower risk of infections and complications\par \par - Dr. Stevens for transperineal biopsy\par - f/u after above\par \par \par \par \par \par

## 2020-08-17 NOTE — HISTORY OF PRESENT ILLNESS
[FreeTextEntry1] : 69 yo with elevated PSA \par on rapaflo \par \par 4K - 63% PSA 7.15 ng/ml\par \par PSA 7/2020\par 6.1 with 11%\par \par PSA 1/2020\par 5.2 with 12%\par \par PSA 12/2019\par 4.8 \par \par MRI 3/26/2020\par PIRADS 2 \par 58 grams prostate [Urinary Frequency] : urinary frequency [Dysuria] : dysuria

## 2020-08-17 NOTE — PROCEDURE
[Urgency/Frequency] : urgency/frequency [Allergies Reviewed] : Allergies reviewed [None] : none [Other ___] : [unfilled] [Strong Urge to Void] : with a strong urge to void [Qavg mL/s ___] : Qavg mL/s [unfilled] [Qmax mL/s ___] : Qmax mL/s [unfilled] [Voided volume ___ mL] : voided volume [unfilled] mL [Post void residual ___] : post void residual [unfilled] mL [Normal] : Normal [Decreased] : decreased [Plateaued] : plateaued [No] :  no complications [Unable to Assess] : unable to assess [Tolerated Well] : the patient tolerated the procedure well

## 2020-08-17 NOTE — PHYSICAL EXAM
[General Appearance - Well Nourished] : well nourished [General Appearance - Well Developed] : well developed [Normal Appearance] : normal appearance [Abdomen Soft] : soft [General Appearance - In No Acute Distress] : no acute distress [Well Groomed] : well groomed [Urethral Meatus] : meatus normal [Costovertebral Angle Tenderness] : no ~M costovertebral angle tenderness [Abdomen Tenderness] : non-tender [Urinary Bladder Findings] : the bladder was normal on palpation [Scrotum] : the scrotum was normal [No Prostate Nodules] : no prostate nodules [Testes Mass (___cm)] : there were no testicular masses [Edema] : no peripheral edema [] : no respiratory distress [Exaggerated Use Of Accessory Muscles For Inspiration] : no accessory muscle use [Respiration, Rhythm And Depth] : normal respiratory rhythm and effort [Oriented To Time, Place, And Person] : oriented to person, place, and time [Not Anxious] : not anxious [Affect] : the affect was normal [Mood] : the mood was normal [Normal Station and Gait] : the gait and station were normal for the patient's age [No Focal Deficits] : no focal deficits [No Palpable Adenopathy] : no palpable adenopathy

## 2020-08-17 NOTE — LETTER BODY
[Dear  ___] : Dear ~DEISY, [Courtesy Letter:] : I had the pleasure of seeing your patient, [unfilled], in my office today. [Sincerely,] : Sincerely, [Please see my note below.] : Please see my note below. [FreeTextEntry2] : Rickie Sorensen MD\par [address]\[place] [address]\par

## 2020-08-31 ENCOUNTER — APPOINTMENT (OUTPATIENT)
Dept: UROLOGY | Facility: CLINIC | Age: 70
End: 2020-08-31
Payer: MEDICARE

## 2020-08-31 VITALS
SYSTOLIC BLOOD PRESSURE: 133 MMHG | HEIGHT: 71 IN | WEIGHT: 251 LBS | HEART RATE: 93 BPM | DIASTOLIC BLOOD PRESSURE: 83 MMHG | BODY MASS INDEX: 35.14 KG/M2

## 2020-08-31 VITALS — TEMPERATURE: 96.5 F

## 2020-08-31 DIAGNOSIS — N40.1 BENIGN PROSTATIC HYPERPLASIA WITH LOWER URINARY TRACT SYMPMS: ICD-10-CM

## 2020-08-31 DIAGNOSIS — R35.1 NOCTURIA: ICD-10-CM

## 2020-08-31 DIAGNOSIS — N13.8 BENIGN PROSTATIC HYPERPLASIA WITH LOWER URINARY TRACT SYMPMS: ICD-10-CM

## 2020-08-31 PROCEDURE — 51741 ELECTRO-UROFLOWMETRY FIRST: CPT

## 2020-08-31 PROCEDURE — 51798 US URINE CAPACITY MEASURE: CPT

## 2020-08-31 PROCEDURE — 99213 OFFICE O/P EST LOW 20 MIN: CPT | Mod: 25

## 2020-08-31 NOTE — ASSESSMENT
[FreeTextEntry1] : His uroflow is within normal parameters and his voiding diary suggests cardiac nocturia. We will hold off on addressing his bothersome urinary symptoms, Other than Rapaflo, until after his prostate biopsy. He understands that based on those results, If positive then treatment can drastically changed his protocol. For now, he will start behavioral modifications and raising his legs prior to bed time.\par \par He will follow up in the end of September after his biopsy

## 2020-08-31 NOTE — LETTER BODY
[Dear  ___] : Dear  [unfilled], [Courtesy Letter:] : I had the pleasure of seeing your patient, [unfilled], in my office today. [Please see my note below.] : Please see my note below. [Sincerely,] : Sincerely, [FreeTextEntry2] : Rickie Sorensen MD\par [address]\[place] [address]\par

## 2020-08-31 NOTE — HISTORY OF PRESENT ILLNESS
[Urinary Incontinence] : urinary incontinence [Urinary Urgency] : urinary urgency [Nocturia] : nocturia [Straining] : straining [Weak Stream] : weak stream [Erectile Dysfunction] : Erectile Dysfunction [FreeTextEntry1] : Braydon is a 70-year-old male we are following for an elevated PSA with the prior MRI that was PIRADS II. Hi 4K score was 63% and he had seen  who recommended fusion prostate biopsy. He has been scheduled for 9/3/20 with .\par \par He Is currently managed on Rapaflo p.o. q. daily however, is having some breakthrough symptoms, mostly severe urinary urgency with 2 episodes of nocturia.\par \par He presented to his voiding diary and uroflow study\par

## 2020-08-31 NOTE — LETTER HEADER
[FreeTextEntry3] : Sherly Shepard M.D.\par Director of Urology\par [facility]\par [address] [place] [address], [address]\[place] [address]

## 2020-09-03 ENCOUNTER — APPOINTMENT (OUTPATIENT)
Dept: UROLOGY | Facility: CLINIC | Age: 70
End: 2020-09-03
Payer: MEDICARE

## 2020-09-03 VITALS
SYSTOLIC BLOOD PRESSURE: 147 MMHG | BODY MASS INDEX: 34.58 KG/M2 | DIASTOLIC BLOOD PRESSURE: 76 MMHG | HEIGHT: 71 IN | WEIGHT: 247 LBS | HEART RATE: 81 BPM

## 2020-09-03 VITALS — TEMPERATURE: 97.8 F

## 2020-09-03 PROCEDURE — 99214 OFFICE O/P EST MOD 30 MIN: CPT

## 2020-09-03 NOTE — PHYSICAL EXAM
[General Appearance - Well Nourished] : well nourished [General Appearance - Well Developed] : well developed [Well Groomed] : well groomed [Normal Appearance] : normal appearance [Edema] : no peripheral edema [General Appearance - In No Acute Distress] : no acute distress [Respiration, Rhythm And Depth] : normal respiratory rhythm and effort [Exaggerated Use Of Accessory Muscles For Inspiration] : no accessory muscle use [Costovertebral Angle Tenderness] : no ~M costovertebral angle tenderness [Abdomen Tenderness] : non-tender [Abdomen Soft] : soft [Urethral Meatus] : meatus normal [Urinary Bladder Findings] : the bladder was normal on palpation [Scrotum] : the scrotum was normal [Testes Mass (___cm)] : there were no testicular masses [Normal Station and Gait] : the gait and station were normal for the patient's age [No Focal Deficits] : no focal deficits [] : no rash [Affect] : the affect was normal [Mood] : the mood was normal [Oriented To Time, Place, And Person] : oriented to person, place, and time [Not Anxious] : not anxious [No Palpable Adenopathy] : no palpable adenopathy

## 2020-09-03 NOTE — REVIEW OF SYSTEMS
[Chest Pain] : chest pain [Abdominal Pain] : abdominal pain [Constipation] : constipation [Diarrhea] : diarrhea [Dizziness] : dizziness [Difficulty Walking] : difficulty walking [Negative] : Heme/Lymph

## 2020-09-04 NOTE — HISTORY OF PRESENT ILLNESS
[FreeTextEntry1] : Braydon Lemons presents to the office today.  He is a 70-year-old man who was referred for consideration of a transperineal biopsy of the prostate.  The patient has had a PSA elevation noted of 7.05 ng/mL.  An MRI of the prostate was performed in March of this year showing a prostate of 58 cm³ PI-RADS 2 designation.  There were no lesions of concern visualized.  4K score more recently and this showed 63% likelihood of him harboring clinically significant prostate cancer.  He was advised to see me with regard to the potential biopsy.  He denies any prior biopsy history.  He has apparently undergone a prior procedure of the prostate that the patient describes as being done under local anesthesia or something was inserted into his prostate, possibly microwave therapy.  He says that he got very mild benefit from this and now uses Rapaflo to address symptoms of a weakened urinary stream.\par \par The patient does not know of any prior family history of prostate cancer.  He denies any current symptoms of gross hematuria or dysuria.  He has not been in retention of urine requiring catheterization and also denies prior urinary tract infection.\par

## 2020-09-04 NOTE — ASSESSMENT
[FreeTextEntry1] : We discussed the MRI results and his PSA level.  4K score suggest a high likelihood of clinically significant prostate cancer although I think that based on his MRI findings and PSA density less than 0.15 that his likelihood is probably for less than the 63% as indicated by the 4K score.  I do think that a biopsy here is reasonable to consider and transperineal biopsy was discussed with the patient today. I've explained the nature of that procedure including the use of transrectal ultrasound to visualize the prostate and the use of local anesthetic with 1% lidocaine to anesthetize the area of the perineum and the periprostatic tissues. We discussed that this will be done without general anesthesia and therefore it is viable for the patient to be regularly without fasting before the procedure. I've explained the procedure typically takes 10-15 minutes to perform under the ultrasound guidance. Infection risk with this particular type of biopsy is very very low and does not require the use of prophylactic antibiotic. Side effects of the biopsy including blood per urethra and blood in his semen were also reviewed. I recommended that the patient stop any blood thinning medications as applicable.\par \par He communicates that he understands the plan and is in agreement with moving forward with the procedure. I have given him written biopsy instructions which review the details outlined above.

## 2020-09-17 ENCOUNTER — APPOINTMENT (OUTPATIENT)
Dept: UROLOGY | Facility: CLINIC | Age: 70
End: 2020-09-17
Payer: MEDICARE

## 2020-09-17 ENCOUNTER — OUTPATIENT (OUTPATIENT)
Dept: OUTPATIENT SERVICES | Facility: HOSPITAL | Age: 70
LOS: 1 days | End: 2020-09-17
Payer: MEDICARE

## 2020-09-17 VITALS — SYSTOLIC BLOOD PRESSURE: 143 MMHG | HEART RATE: 68 BPM | TEMPERATURE: 97.3 F | DIASTOLIC BLOOD PRESSURE: 83 MMHG

## 2020-09-17 VITALS — TEMPERATURE: 97.3 F

## 2020-09-17 DIAGNOSIS — H26.9 UNSPECIFIED CATARACT: Chronic | ICD-10-CM

## 2020-09-17 DIAGNOSIS — R35.0 FREQUENCY OF MICTURITION: ICD-10-CM

## 2020-09-17 PROCEDURE — 76942 ECHO GUIDE FOR BIOPSY: CPT | Mod: 26,59

## 2020-09-17 PROCEDURE — 55700: CPT | Mod: 22

## 2020-09-17 PROCEDURE — 76872 US TRANSRECTAL: CPT | Mod: 26

## 2020-09-17 PROCEDURE — 76942 ECHO GUIDE FOR BIOPSY: CPT | Mod: 59

## 2020-09-17 PROCEDURE — 55700: CPT

## 2020-09-17 PROCEDURE — 76872 US TRANSRECTAL: CPT

## 2020-09-17 PROCEDURE — 88305 TISSUE EXAM BY PATHOLOGIST: CPT | Mod: 26

## 2020-09-17 RX ORDER — ENEMA 19; 7 G/133ML; G/133ML
7-19 ENEMA RECTAL
Qty: 1 | Refills: 0 | Status: COMPLETED | COMMUNITY
Start: 2020-09-03 | End: 2020-09-17

## 2020-09-17 RX ORDER — DIAZEPAM 5 MG/1
5 TABLET ORAL
Qty: 1 | Refills: 0 | Status: COMPLETED | COMMUNITY
Start: 2020-09-03 | End: 2020-09-17

## 2020-09-23 LAB — CORE LAB BIOPSY: NORMAL

## 2020-09-25 DIAGNOSIS — R97.20 ELEVATED PROSTATE SPECIFIC ANTIGEN [PSA]: ICD-10-CM

## 2020-09-30 ENCOUNTER — APPOINTMENT (OUTPATIENT)
Dept: UROLOGY | Facility: CLINIC | Age: 70
End: 2020-09-30
Payer: MEDICARE

## 2020-09-30 VITALS
HEART RATE: 80 BPM | DIASTOLIC BLOOD PRESSURE: 76 MMHG | SYSTOLIC BLOOD PRESSURE: 122 MMHG | HEIGHT: 71 IN | TEMPERATURE: 97.4 F | WEIGHT: 248 LBS | BODY MASS INDEX: 34.72 KG/M2

## 2020-09-30 PROCEDURE — 99215 OFFICE O/P EST HI 40 MIN: CPT

## 2020-09-30 NOTE — HISTORY OF PRESENT ILLNESS
[Urinary Incontinence] : urinary incontinence [Urinary Urgency] : urinary urgency [Nocturia] : nocturia [Straining] : straining [Weak Stream] : weak stream [Erectile Dysfunction] : Erectile Dysfunction [FreeTextEntry1] : Braydon is a 70-year-old male we are following for an elevated PSA with the prior MRI that was PIRADS II. Hi 4K score was 63%. He had seen  who recommended fusion prostate biopsy. He is status post fusion biopsy, with , which demonstrated 1 core of David 7(3+4) Involving 50% of the cores.\par \par He presents today to Discuss treatment options.\par \par \par He also presented to review his voiding diary and consider a uroflow study\par

## 2020-09-30 NOTE — PHYSICAL EXAM

## 2020-09-30 NOTE — LETTER HEADER
[FreeTextEntry3] : Sherly Shepard M.D.\par Director of Urology\par [facility]\par [address] [place] [address], [address]\[place] [address]

## 2020-09-30 NOTE — ASSESSMENT
[FreeTextEntry1] : He called his son and put him on speakerphone so he can ask questions.\par \par We discussed the full pathology of his cancer and discussed its statistical aggressiveness.\par \par We had a lengthy discussion regarding possible treatment options including; active surveillance, radiation and the different types of radiation modalities, and surgical intervention, including probiotic prostatectomy.\par \par All Options were reviewed in detail with regards to potential outcomes and adverse events including urinary incontinence and erectile dysfunction.\par \par After thorough discussion of all the options available is elected to followup with Dr. Mason, radiation oncology, and  for surgical consultation. \par \par Afterwards he will consider his options and make a decision.\par \par All questions and concerns were fully addressed\par \par With respect to his ED and voiding dysfunction whatever choice he chooses for treatment of his prostate cancer will affect both his ability to obtain and maintain erections and any element of bladder outlet obstruction he currently has. We will therefore hold off on further evaluation of those issues until his quantity of life issue is resolved\par \par

## 2020-09-30 NOTE — LETTER BODY
[Dear  ___] : Dear  [unfilled], [Courtesy Letter:] : I had the pleasure of seeing your patient, [unfilled], in my office today. [Please see my note below.] : Please see my note below. [Sincerely,] : Sincerely, [FreeTextEntry2] : Rickie Sorensen MD\par [address]\[place] [address]\par

## 2020-10-05 ENCOUNTER — APPOINTMENT (OUTPATIENT)
Dept: UROLOGY | Facility: CLINIC | Age: 70
End: 2020-10-05
Payer: MEDICARE

## 2020-10-05 VITALS — HEIGHT: 71 IN | TEMPERATURE: 97.6 F | WEIGHT: 248 LBS | BODY MASS INDEX: 34.72 KG/M2

## 2020-10-05 PROCEDURE — 99215 OFFICE O/P EST HI 40 MIN: CPT

## 2020-10-05 NOTE — PHYSICAL EXAM
[General Appearance - Well Developed] : well developed [General Appearance - Well Nourished] : well nourished [Normal Appearance] : normal appearance [Well Groomed] : well groomed [General Appearance - In No Acute Distress] : no acute distress [Abdomen Soft] : soft [Abdomen Tenderness] : non-tender [Costovertebral Angle Tenderness] : no ~M costovertebral angle tenderness [FreeTextEntry1] : obese [Urethral Meatus] : meatus normal [Urinary Bladder Findings] : the bladder was normal on palpation [Scrotum] : the scrotum was normal [Testes Mass (___cm)] : there were no testicular masses [No Prostate Nodules] : no prostate nodules [Prostate Size ___ gm] : prostate size [unfilled] gm [Edema] : no peripheral edema [] : no respiratory distress [Respiration, Rhythm And Depth] : normal respiratory rhythm and effort [Exaggerated Use Of Accessory Muscles For Inspiration] : no accessory muscle use [Oriented To Time, Place, And Person] : oriented to person, place, and time [Affect] : the affect was normal [Mood] : the mood was normal [Not Anxious] : not anxious [Normal Station and Gait] : the gait and station were normal for the patient's age [No Focal Deficits] : no focal deficits [No Palpable Adenopathy] : no palpable adenopathy

## 2020-10-05 NOTE — ASSESSMENT
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  Patient is favoring radiation which I think is reasonable although I did express my concern for his significant BPH/LUTS.\par He will discuss w Dr Martin and corry if he wishes to undergo surgery.\par \par \par Our discussion summarized --\par The natural history of this disease was explained to the patient at length and the treatment options discussed including radical prostatectomy by open or robotic-assisted laparoscopic approach, external-beam radiotherapy, brachytherapy, and watchful waiting, including the probability of success and complications associated with these approaches.\par \par With respect to AS/watchful waiting, we discussed the difficulties of estimating the extent of disease preoperatively, the risk of cancer progression, and risk that salvage might not be possible with progression. \par However, the favorable long-term outcomes of active surveillance in appropriately selected patients was conveyed. \par \par With respect to seed implants, we discussed risks including but not limited to cancer recurrence, exacerbation of voiding symptoms or hematuria, urinary retention, induction of 2nd malignancy, and risks of rectal symptoms or bleeding.  We also discussed risks of the anesthesia including but not limited to MI, CVA, DVT, and PE.  \par With respect to EBRT, we discussed we discussed risks including but not limited to cancer recurrence, exacerbation of voiding symptoms or hematuria, urinary retention, incontinence, stricture of the urinary tract, induction of 2nd malignancy, and risks of rectal symptoms or bleeding.  We discussed fact that rectal symptoms may be more common with EBRT than with other treatment modalities. I did convey that the risk of erectile dysfunction was likely lower with EBRT, at least in the short-term.\par \par With radiation therapy, we discussed the difficulties in diagnosing recurrent disease at an early stage due to variability in PSA levels and the fact that most patients are not candidates for local salvage therapy when biochemical recurrence is declared.  We also discussed the significant morbidity of local salvage therapy in terms of perioperative complications, erectile dysfunction and urinary incontinence.\par \par With respect to radical prostatectomy, the pros and cons of open vs robotic-assisted laparoscopic prostatectomy were discussed. The complications of this procedure were reviewed with the patient and include (but not limited to) urinary incontinence, erectile dysfunction, infertility, anastomotic stricture, lymphocele, hemorrhage requiring transfusion, rectal, ureteral, or nerve injury, infection, cardiovascular, pulmonary, thromboembolic, and anesthetic complications.  For robotic prostatectomy, the additional complications of anastomotic urine leak and small bowel obstruction from adhesions was conveyed. We also discussed the need for a urethral catheter as well as a SAYRA drain post-operatively.\par \par With surgery, we also discussed the potential advantage over radiation therapy in that biochemical recurrence can be detected at a relatively earlier stage and that salvage radiotherapy is successful in controlling recurrent disease in a substantial proportion of patients.  I also conveyed that salvage radiotherapy was associated with a considerably more favorable morbidity profile compared to local salvage therapies for radiorecurrent disease. \par \par We also discussed prostate cryotherapy in detail, including aspects related to the procedure and the outcomes with respect to cancer control, urinary dysfunction, impotence, and morbidity.\par \par All of the patient questions were answered.  \par \par \par \par \par

## 2020-10-05 NOTE — HISTORY OF PRESENT ILLNESS
[FreeTextEntry1] : CORIN STOUT is a 70 year old  male hx BPH/LUTS on rapaflo and elevated PSA sp TP pnbx found to have Gl 3+4 PCa 1 of 12 cores positive involving 50% of core.\par \par At baseline he has severe lower urinary tract symptoms including nocturia 5 times with weak force of stream daytime frequency and urgency.\par Has baseline erectile dysfunction\par Denies gross hematuria, dysuria or associated symptoms. Denies flank pain.\par \par PSA 7.15 4k score 63% 8/2020\par MRI prostate images visualized demonstrating 58 g prostate with moderate intravesical median lobe. No lymphadenopathy. No pirads 3 or greater lesions.\par \par Denies  PMH including previous kidney stones, recurrent UTIs. \par PMH includes obesity BMI 36, htn, ckd, hld, ?Cad\par Family History: No  malignancies\par Social History: from [place], former , son is LPN\par PSH denies \par

## 2020-10-21 ENCOUNTER — APPOINTMENT (OUTPATIENT)
Dept: RADIATION ONCOLOGY | Facility: HOSPITAL | Age: 70
End: 2020-10-21
Payer: MEDICARE

## 2020-10-21 VITALS
HEART RATE: 77 BPM | WEIGHT: 246 LBS | HEIGHT: 69 IN | DIASTOLIC BLOOD PRESSURE: 74 MMHG | SYSTOLIC BLOOD PRESSURE: 155 MMHG | RESPIRATION RATE: 16 BRPM | TEMPERATURE: 97.2 F | OXYGEN SATURATION: 98 % | BODY MASS INDEX: 36.43 KG/M2

## 2020-10-21 DIAGNOSIS — Z78.9 OTHER SPECIFIED HEALTH STATUS: ICD-10-CM

## 2020-10-21 PROCEDURE — 99204 OFFICE O/P NEW MOD 45 MIN: CPT

## 2020-10-21 RX ORDER — LACTULOSE 10 G/15ML
10 SOLUTION ORAL
Qty: 473 | Refills: 0 | Status: DISCONTINUED | COMMUNITY
Start: 2019-10-11 | End: 2020-10-21

## 2020-10-21 NOTE — PHYSICAL EXAM
[Obese] : obese [Sclera] : the sclera and conjunctiva were normal [Heart Rate And Rhythm] : heart rate and rhythm were normal [Heart Sounds] : normal S1 and S2 [Murmurs] : no murmurs present [Edema] : no peripheral edema present [Abdomen Tenderness] : non-tender [Musculoskeletal - Swelling] : no joint swelling [Nail Clubbing] : no clubbing  or cyanosis of the fingernails [Normal] : supple with no thyromegaly or masses appreciated [de-identified] : BMI is 36 [de-identified] : Obese [FreeTextEntry1] : The prostate is flat without suspicious nodularity or induration.  Sulci are distinct.

## 2020-10-21 NOTE — HISTORY OF PRESENT ILLNESS
[FreeTextEntry1] : Patient is a 70 year old gentlemen whose PSA was 2.4 in 2/18.  It then went to 4.6 in 12/19.     Given that he is off and on with Avodart, the true behavior is more opaque.  An MRI done in Feb 2020 only noted a low risk (PI-RADS 2) lesion. The gland mreasured 58 ml.  However the PSA continued to rise and was 7.15 on 8/2/2020. by August the 4K was quite elevated at 63%.  He is  s/p core biopsy on 9/23/2020 and pathology shows adenocarcinoma of the prostate, right anterior with a [place] score 3 + 4 = 7 involving 50 % (6 mm in length) 1 of 12 cores positive.  \par \par PSA \par 8/2/2020 - 7.15 ng/mL\par 7/16/2020 -  6.1 ng/mL\par 1/30/2020 - 5.2 ng/mL\par 12/12/2019 - 4.6 ng/mL\par 2/26/2018 - 2.4 ng/mL\par \par Pt reports of urinary frequency, will get up 3-5 times during the night, weak stream and urinary urgency. worsening in the last 6 months.  He denies hematuria and dysuria. He states, "not really" sexually active, loss of interest.   \par Patient was seen by Dr Talamantes approximately 3 weeks ago for surgical interventions.  Pt and Pt's son reports,  they would rather avoid surgery and want to discuss radiotherapy.   \par Urologist  Dr. Shepard/Dr. Stevens\par

## 2020-10-21 NOTE — REVIEW OF SYSTEMS
[Fatigue] : fatigue [SOB on Exertion] : shortness of breath during exertion [Abdominal Pain] : abdominal pain [Constipation] : constipation [Joint Pain] : joint pain [Muscle Pain] : muscle pain [Negative] : Integumentary [Fever] : no fever [Chills] : no chills [Night Sweats] : no night sweats [Chest Pain] : no chest pain [Lower Ext Edema] : no lower extremity edema [Shortness Of Breath] : no shortness of breath [Wheezing] : no wheezing [Cough] : no cough [Vomiting] : no vomiting [Confused] : no confusion [Dizziness] : no dizziness [Fainting] : no fainting [Suicidal] : not suicidal [Insomnia] : no insomnia [Anxiety] : no anxiety [Depression] : no depression [FreeTextEntry7] : chronic

## 2020-10-21 NOTE — VITALS
[90: Able to carry normal activity; minor signs or symptoms of disease.] : 90: Able to carry normal activity; minor signs or symptoms of disease.  [80: Active, but tires more quickly] : 80: Active, but tires more quickly [0 - No Distress] : Distress Level: 0

## 2020-11-09 DIAGNOSIS — D35.00 BENIGN NEOPLASM OF UNSPECIFIED ADRENAL GLAND: ICD-10-CM

## 2020-11-09 DIAGNOSIS — N18.30 CHRONIC KIDNEY DISEASE, STAGE 3 UNSPECIFIED: ICD-10-CM

## 2020-11-09 PROCEDURE — 55874 TPRNL PLMT BIODEGRDABL MATRL: CPT

## 2020-11-10 ENCOUNTER — APPOINTMENT (OUTPATIENT)
Dept: OTOLARYNGOLOGY | Facility: CLINIC | Age: 70
End: 2020-11-10
Payer: MEDICARE

## 2020-11-10 DIAGNOSIS — Z87.09 PERSONAL HISTORY OF OTHER DISEASES OF THE RESPIRATORY SYSTEM: ICD-10-CM

## 2020-11-10 PROCEDURE — 99203 OFFICE O/P NEW LOW 30 MIN: CPT | Mod: 25

## 2020-11-10 PROCEDURE — 99072 ADDL SUPL MATRL&STAF TM PHE: CPT

## 2020-11-10 PROCEDURE — 31575 DIAGNOSTIC LARYNGOSCOPY: CPT

## 2020-11-10 RX ORDER — PANTOPRAZOLE 40 MG/1
40 TABLET, DELAYED RELEASE ORAL
Qty: 30 | Refills: 3 | Status: ACTIVE | COMMUNITY
Start: 2020-11-10 | End: 1900-01-01

## 2020-11-10 NOTE — PROCEDURE
[Complicated Symptoms] : complicated symptoms requiring more thorough examination than provided by mirror [None] : none [Flexible Endoscope] : examined with the flexible endoscope [Normal] : posterior cricoid area had healthy pink mucosa in the interarytenoid area and the esophageal inlet

## 2020-11-10 NOTE — HISTORY OF PRESENT ILLNESS
[de-identified] : Patient presents today with c/o throat discomfort. Patient admits pain inside his throat. Patient states has been going on for about 1 year. No dysphagia with foods or liquids. No throat burning. No recent weight loss. Pt has symptoms intermittently and randomly. Pt has been treated with abx and PPI with some improvement.

## 2020-11-20 ENCOUNTER — OUTPATIENT (OUTPATIENT)
Dept: OUTPATIENT SERVICES | Facility: HOSPITAL | Age: 70
LOS: 1 days | Discharge: HOME | End: 2020-11-20

## 2020-11-20 ENCOUNTER — LABORATORY RESULT (OUTPATIENT)
Age: 70
End: 2020-11-20

## 2020-11-20 DIAGNOSIS — H26.9 UNSPECIFIED CATARACT: Chronic | ICD-10-CM

## 2020-11-20 DIAGNOSIS — Z11.59 ENCOUNTER FOR SCREENING FOR OTHER VIRAL DISEASES: ICD-10-CM

## 2020-11-23 ENCOUNTER — RESULT REVIEW (OUTPATIENT)
Age: 70
End: 2020-11-23

## 2020-11-23 ENCOUNTER — OUTPATIENT (OUTPATIENT)
Dept: OUTPATIENT SERVICES | Facility: HOSPITAL | Age: 70
LOS: 1 days | Discharge: HOME | End: 2020-11-23
Payer: MEDICARE

## 2020-11-23 DIAGNOSIS — H26.9 UNSPECIFIED CATARACT: Chronic | ICD-10-CM

## 2020-11-23 DIAGNOSIS — R61 GENERALIZED HYPERHIDROSIS: ICD-10-CM

## 2020-11-23 DIAGNOSIS — C61 MALIGNANT NEOPLASM OF PROSTATE: ICD-10-CM

## 2020-11-23 PROCEDURE — 77334 RADIATION TREATMENT AID(S): CPT | Mod: 26

## 2020-11-23 PROCEDURE — 72195 MRI PELVIS W/O DYE: CPT | Mod: 26

## 2020-12-01 PROCEDURE — 77300 RADIATION THERAPY DOSE PLAN: CPT | Mod: 26

## 2020-12-01 PROCEDURE — 77334 RADIATION TREATMENT AID(S): CPT | Mod: 26

## 2020-12-01 PROCEDURE — 77295 3-D RADIOTHERAPY PLAN: CPT | Mod: 26

## 2020-12-01 PROCEDURE — 77435 SBRT MANAGEMENT: CPT

## 2020-12-08 PROCEDURE — 77427 RADIATION TX MANAGEMENT X5: CPT

## 2020-12-14 ENCOUNTER — NON-APPOINTMENT (OUTPATIENT)
Age: 70
End: 2020-12-14

## 2020-12-14 VITALS
RESPIRATION RATE: 16 BRPM | SYSTOLIC BLOOD PRESSURE: 136 MMHG | DIASTOLIC BLOOD PRESSURE: 69 MMHG | OXYGEN SATURATION: 93 % | WEIGHT: 244.4 LBS | TEMPERATURE: 98.4 F | BODY MASS INDEX: 36.09 KG/M2 | HEART RATE: 83 BPM

## 2020-12-14 NOTE — PHYSICAL EXAM
[Hearing Threshold Finger Rub Not [place]] : hearing was normal [] : no respiratory distress [Heart Rate And Rhythm] : heart rate and rhythm were normal [Abdomen Soft] : soft [Abdomen Tenderness] : non-tender [Normal] : oriented to person, place and time, the affect was normal, the mood was normal and not anxious

## 2020-12-14 NOTE — REVIEW OF SYSTEMS
[Hematuria: Grade 0] : Hematuria: Grade 0 [Urinary Incontinence: Grade 0] : Urinary Incontinence: Grade 0  [Urinary Retention: Grade 0] : Urinary Retention: Grade 0 [Urinary Tract Pain: Grade 0] : Urinary Tract Pain: Grade 0 [Urinary Urgency: Grade 0] : Urinary Urgency: Grade 0 [Urinary Frequency: Grade 1 - Present] : Urinary Frequency: Grade 1 - Present

## 2020-12-15 NOTE — HISTORY OF PRESENT ILLNESS
[FreeTextEntry1] : Patient is a 70 year old gentlemen whose PSA was 2.4 in 2/18.  It then went to 4.6 in 12/19.     Given that he is off and on with Avodart, the true behavior is more opaque.  An MRI done in Feb 2020 only noted a low risk (PI-RADS 2) lesion. The gland measured 58 ml.  However the PSA continued to rise and was 7.15 on 8/2/2020. by August the 4K was quite elevated at 63%.  He is  s/p core biopsy on 9/23/2020 and pathology shows adenocarcinoma of the prostate, right anterior with a [place] score 3 + 4 = 7 involving 50 % (6 mm in length) 1 of 12 cores positive.  \par \par PSA \par 8/2/2020 - 7.15 ng/mL\par 7/16/2020 -  6.1 ng/mL\par 1/30/2020 - 5.2 ng/mL\par 12/12/2019 - 4.6 ng/mL\par 2/26/2018 - 2.4 ng/mL\par \par Pt reports of urinary frequency, will get up 3-5 times during the night, weak stream and urinary urgency. worsening in the last 6 months.  He denies hematuria and dysuria. He states, "not really" sexually active, loss of interest.   \par Patient was seen by Dr Talamantes approximately 3 weeks ago for surgical interventions.  Pt and Pt's son reports,  they would rather avoid surgery and want to discuss radiotherapy.   \par Urologist  Dr. Shepard/Dr. Stevens\par \par 12/14/2020: follow up: Patient reports of urinary hesitation and dribbling.  He denies dysuria, foul smell on urination, CP and SOB.\par \par

## 2020-12-15 NOTE — DISEASE MANAGEMENT
[Clinical] : TNM Stage: c [IIA] : IIA [FreeTextEntry4] : Adenocarcinoma of the prostate [TTNM] : 1c [NTNM] : 0 [MTNM] : 0 [de-identified] : prostate

## 2020-12-22 ENCOUNTER — OUTPATIENT (OUTPATIENT)
Dept: OUTPATIENT SERVICES | Facility: HOSPITAL | Age: 70
LOS: 1 days | Discharge: HOME | End: 2020-12-22
Payer: MEDICARE

## 2020-12-22 ENCOUNTER — NON-APPOINTMENT (OUTPATIENT)
Age: 70
End: 2020-12-22

## 2020-12-22 DIAGNOSIS — H26.9 UNSPECIFIED CATARACT: Chronic | ICD-10-CM

## 2020-12-22 DIAGNOSIS — C61 MALIGNANT NEOPLASM OF PROSTATE: ICD-10-CM

## 2020-12-22 PROCEDURE — 77435 SBRT MANAGEMENT: CPT

## 2020-12-30 ENCOUNTER — NON-APPOINTMENT (OUTPATIENT)
Age: 70
End: 2020-12-30

## 2021-01-06 PROCEDURE — 77263 THER RADIOLOGY TX PLNG CPLX: CPT

## 2021-01-11 ENCOUNTER — NON-APPOINTMENT (OUTPATIENT)
Age: 71
End: 2021-01-11

## 2021-01-25 ENCOUNTER — APPOINTMENT (OUTPATIENT)
Dept: RADIATION ONCOLOGY | Facility: HOSPITAL | Age: 71
End: 2021-01-25
Payer: MEDICARE

## 2021-01-25 VITALS
RESPIRATION RATE: 16 BRPM | DIASTOLIC BLOOD PRESSURE: 62 MMHG | SYSTOLIC BLOOD PRESSURE: 116 MMHG | HEART RATE: 96 BPM | OXYGEN SATURATION: 96 % | TEMPERATURE: 98.2 F | BODY MASS INDEX: 35.47 KG/M2 | WEIGHT: 240.2 LBS

## 2021-01-25 PROCEDURE — 99024 POSTOP FOLLOW-UP VISIT: CPT

## 2021-01-25 RX ORDER — ALPRAZOLAM 0.5 MG/1
0.5 TABLET ORAL
Qty: 2 | Refills: 0 | Status: DISCONTINUED | COMMUNITY
Start: 2020-11-05 | End: 2021-01-25

## 2021-01-25 RX ORDER — DIAZEPAM 5 MG/1
5 TABLET ORAL
Qty: 2 | Refills: 0 | Status: DISCONTINUED | COMMUNITY
Start: 2020-11-19 | End: 2021-01-25

## 2021-01-25 RX ORDER — AMOXICILLIN AND CLAVULANATE POTASSIUM 875; 125 MG/1; MG/1
875-125 TABLET, COATED ORAL
Qty: 6 | Refills: 0 | Status: DISCONTINUED | COMMUNITY
Start: 2020-11-05 | End: 2021-01-25

## 2021-01-25 RX ORDER — NAPROXEN 500 MG/1
500 TABLET ORAL
Qty: 30 | Refills: 0 | Status: DISCONTINUED | COMMUNITY
Start: 2020-06-03 | End: 2021-01-25

## 2021-01-25 NOTE — REASON FOR VISIT
[Post-Treatment Evaluation] : post-treatment evaluation for [Prostate Cancer] : prostate cancer [Family Member] : family member

## 2021-01-26 NOTE — PHYSICAL EXAM
[Obese] : obese [Sclera] : the sclera and conjunctiva were normal [Heart Rate And Rhythm] : heart rate and rhythm were normal [Heart Sounds] : normal S1 and S2 [Murmurs] : no murmurs present [Abdomen Tenderness] : non-tender [Edema] : no peripheral edema present [Musculoskeletal - Swelling] : no joint swelling [Nail Clubbing] : no clubbing  or cyanosis of the fingernails [Normal] : oriented to person, place and time, the affect was normal, the mood was normal and not anxious [de-identified] : BMI is 36 [FreeTextEntry1] : The prostate is flat without suspicious nodularity or induration.  Sulci are distinct.  [de-identified] : Obese

## 2021-01-26 NOTE — HISTORY OF PRESENT ILLNESS
[FreeTextEntry1] : 1/25/2021 One month f/u for SBRT to the prostate. Presently patient denies pain while urinating, only complaint is frequency and urgency. Recently he has had some abdominal discomfort, and constipation. He does get relief after a bowel movement.  He is taking tamsulosin at .  \par \par  Patient is a 70 year old gentlemen whose PSA was 2.4 in 2/18.  It then went to 4.6 in 12/19.     Given that he is off and on with Avodart, the true behavior is more opaque.  An MRI done in Feb 2020 only noted a low risk (PI-RADS 2) lesion. The gland measured 58 ml.  However the PSA continued to rise and was 7.15 on 8/2/2020. by August the 4K was quite elevated at 63%.  He is  s/p core biopsy on 9/23/2020 and pathology shows adenocarcinoma of the prostate, right anterior with a [place] score 3 + 4 = 7 involving 50 % (6 mm in length) 1 of 12 cores positive.  \par \par PSA \par 8/2/2020 - 7.15 ng/mL\par 7/16/2020 -  6.1 ng/mL\par 1/30/2020 - 5.2 ng/mL\par 12/12/2019 - 4.6 ng/mL\par 2/26/2018 - 2.4 ng/mL\par \par Pt reports of urinary frequency, will get up 3-5 times during the night, weak stream and urinary urgency. worsening in the last 6 months.  He denies hematuria and dysuria. He states, "not really" sexually active, loss of interest.   \par Patient was seen by Dr Talamantes approximately 3 weeks ago for surgical interventions.  Pt and Pt's son reports,  they would rather avoid surgery and want to discuss radiotherapy.   \[facility] Urologist  Dr. Shepard/Dr. Stevens\[facility] \par 12/14/2020: follow up: Patient reports of urinary hesitation and dribbling.  He denies dysuria, foul smell on urination, CP and SOB.\par \par

## 2021-01-26 NOTE — DISEASE MANAGEMENT
[Clinical] : TNM Stage: c [IIA] : IIA [FreeTextEntry4] : Adenocarcinoma of the prostate [TTNM] : 1c [MTNM] : 0 [NTNM] : 0 [de-identified] : prostate

## 2021-01-27 LAB — PSA SERPL-MCNC: 2.67 NG/ML

## 2021-03-01 ENCOUNTER — APPOINTMENT (OUTPATIENT)
Dept: UROLOGY | Facility: CLINIC | Age: 71
End: 2021-03-01
Payer: MEDICARE

## 2021-03-01 VITALS
HEART RATE: 80 BPM | TEMPERATURE: 98 F | SYSTOLIC BLOOD PRESSURE: 135 MMHG | WEIGHT: 250 LBS | BODY MASS INDEX: 37.03 KG/M2 | HEIGHT: 69 IN | DIASTOLIC BLOOD PRESSURE: 85 MMHG

## 2021-03-01 DIAGNOSIS — R33.9 RETENTION OF URINE, UNSPECIFIED: ICD-10-CM

## 2021-03-01 DIAGNOSIS — N39.41 URGE INCONTINENCE: ICD-10-CM

## 2021-03-01 PROCEDURE — 99072 ADDL SUPL MATRL&STAF TM PHE: CPT

## 2021-03-01 PROCEDURE — 99213 OFFICE O/P EST LOW 20 MIN: CPT

## 2021-03-01 NOTE — ASSESSMENT
[FreeTextEntry1] : He is status post radiation therapy in December 2020. Post radiation therapy he is voiding acceptably on combo therapy of tamsulosin in the morning and Rapaflo at night. Will continue taking this medical regimen. His concern is his erectile dysfunction we'll obtain hormone panel, although we likely will not treat if he has low testosterone due to his "still active" prostate cancer, and obtain [place] criteria. He'll followup in a few weeks for review.\par \par Concerning his prostate cancer, he'll followup in June with  with PSA testing\par \par

## 2021-03-01 NOTE — LETTER HEADER
[FreeTextEntry3] : Sherly Shepard M.D.\par Director of Urology\par [facility]\par [address] [place] [address], [address]\[place] [address]

## 2021-03-01 NOTE — LETTER BODY
[Dear  ___] : Dear  [unfilled], [Courtesy Letter:] : I had the pleasure of seeing your patient, [unfilled], in my office today. [Please see my note below.] : Please see my note below. [Sincerely,] : Sincerely, [FreeTextEntry2] : Rickie Sorensen MD\par [address]\[place] [address]\par

## 2021-03-01 NOTE — HISTORY OF PRESENT ILLNESS
[Urinary Incontinence] : urinary incontinence [Urinary Urgency] : urinary urgency [Nocturia] : nocturia [Straining] : straining [Weak Stream] : weak stream [Erectile Dysfunction] : Erectile Dysfunction [FreeTextEntry1] : Braydon is a 70-year-old male He is status post SBRT completed December 2020. His PSA, post radiation treatment, was 2.67 down from 6.1.\par \par He scheduled to follow up in June with radiation oncology for PSA testing and further evaluation\par \par He is voiding with some minimal difficulty managed on tamsulosin in the morning and Silodosin at night. On this medical regimen he is voiding acceptably\par \par Post radiation he is experiencing significant erectile dysfunction with inability to obtain or maintain an erection.\par

## 2021-04-05 ENCOUNTER — APPOINTMENT (OUTPATIENT)
Dept: UROLOGY | Facility: CLINIC | Age: 71
End: 2021-04-05
Payer: MEDICARE

## 2021-04-05 VITALS
HEART RATE: 82 BPM | TEMPERATURE: 97.4 F | SYSTOLIC BLOOD PRESSURE: 126 MMHG | HEIGHT: 69 IN | DIASTOLIC BLOOD PRESSURE: 75 MMHG | WEIGHT: 246 LBS | BODY MASS INDEX: 36.43 KG/M2

## 2021-04-05 DIAGNOSIS — R39.198 OTHER DIFFICULTIES WITH MICTURITION: ICD-10-CM

## 2021-04-05 DIAGNOSIS — R39.11 HESITANCY OF MICTURITION: ICD-10-CM

## 2021-04-05 DIAGNOSIS — R39.15 URGENCY OF URINATION: ICD-10-CM

## 2021-04-05 PROCEDURE — 99214 OFFICE O/P EST MOD 30 MIN: CPT

## 2021-04-05 PROCEDURE — 99072 ADDL SUPL MATRL&STAF TM PHE: CPT

## 2021-04-05 RX ORDER — MINERAL OIL 100 MG/100ML
5 OIL ORAL; TOPICAL
Qty: 60 | Refills: 0 | Status: ACTIVE | COMMUNITY
Start: 2020-11-04

## 2021-04-05 RX ORDER — ACETAMINOPHEN 500 MG
500 TABLET ORAL
Qty: 90 | Refills: 0 | Status: ACTIVE | COMMUNITY
Start: 2020-11-02

## 2021-04-05 RX ORDER — TIMOLOL MALEATE 5 MG/ML
0.5 SOLUTION OPHTHALMIC
Qty: 15 | Refills: 0 | Status: ACTIVE | COMMUNITY
Start: 2020-10-27

## 2021-04-05 RX ORDER — LATANOPROST/PF 0.005 %
0.01 DROPS OPHTHALMIC (EYE)
Qty: 2 | Refills: 0 | Status: ACTIVE | COMMUNITY
Start: 2020-10-27

## 2021-04-05 NOTE — HISTORY OF PRESENT ILLNESS
[Urinary Incontinence] : urinary incontinence [Urinary Urgency] : urinary urgency [Nocturia] : nocturia [Straining] : straining [Weak Stream] : weak stream [Erectile Dysfunction] : Erectile Dysfunction [FreeTextEntry1] : Braydon is a 70-year-old male He is status post SBRT completed December 2020. His PSA, post radiation treatment, was 2.67 down from 6.1.\par \par He is voiding well enough with tamsulosin in the morning and and Silodosin at night. \par \par Post radiation he is experiencing significant erectile dysfunction.  [place] criteria was requested however, patient did not obtain prior to appointment.  He was embarrassed to ask his primary care doctor because she is "a woman".  He reports that he was able to obtain an erection and have successful intercourse with his wife once but it was not great and it is not reliable however, he still would like medication for erections.\par \par He presents today to review his blood work.\par

## 2021-04-05 NOTE — LETTER BODY
[Dear  ___] : Dear  [unfilled], [Courtesy Letter:] : I had the pleasure of seeing your patient, [unfilled], in my office today. [Please see my note below.] : Please see my note below. [Sincerely,] : Sincerely, [FreeTextEntry2] : Rickie Sorensen MD\par [address]\[place] [address]\par

## 2021-04-05 NOTE — ASSESSMENT
[FreeTextEntry1] : He has been able to obtain an erection every now and then and have successful intercourse.  He is interested in PDE 5 inhibitors and will obtain cardiac clearance.  He will follow-up in a few weeks to review.\par \par Concerning his low testosterone levels, and by that I mean low by general criteria not low by the prevalence criteria being used for people that are 1 day into their 70th year, we discussed the link between testosterone levels and prostate cancer.  He will follow up with urooncology for clearance.  If it is okay with urooncology we will consider testosterone replacement therapy if not he may have to live as he is for some time until the state of his prostate cancer declares itself\par \par Follow-up in a few weeks for review

## 2021-04-05 NOTE — LETTER HEADER
[FreeTextEntry3] : Sherly Shepard M.D.\par Director of Urology\par [facility]\par [address] [place] [address], [address]\[place] [address]

## 2021-04-08 ENCOUNTER — NON-APPOINTMENT (OUTPATIENT)
Age: 71
End: 2021-04-08

## 2021-04-12 ENCOUNTER — APPOINTMENT (OUTPATIENT)
Dept: UROLOGY | Facility: CLINIC | Age: 71
End: 2021-04-12
Payer: MEDICARE

## 2021-04-12 VITALS
WEIGHT: 246 LBS | HEIGHT: 69 IN | DIASTOLIC BLOOD PRESSURE: 78 MMHG | SYSTOLIC BLOOD PRESSURE: 128 MMHG | HEART RATE: 78 BPM | TEMPERATURE: 97.6 F | BODY MASS INDEX: 36.43 KG/M2

## 2021-04-12 PROCEDURE — 99214 OFFICE O/P EST MOD 30 MIN: CPT

## 2021-04-12 PROCEDURE — 99072 ADDL SUPL MATRL&STAF TM PHE: CPT

## 2021-04-12 RX ORDER — SILODOSIN 8 MG/1
8 CAPSULE ORAL
Qty: 90 | Refills: 3 | Status: ACTIVE | COMMUNITY

## 2021-04-12 NOTE — ASSESSMENT
[FreeTextEntry1] : 72 yo with low risk s/p SBRT\par the patient has lower urinary tract symptoms\par \par low / normal testosterone\par moderate lower urinary tract symptoms\par \par - I do not advocate test replacement in this patient \par weight loss reinforced\par - silodosin ordered\par - f/u with me as needed\par - f/u with Dr. Shepard\par \par \par \par

## 2021-04-12 NOTE — HISTORY OF PRESENT ILLNESS
[FreeTextEntry1] : 70 yo with intermediate risk prostate cancer\par \par [place] 3+4 (50%) 10 % - core involved \par \par s/p SBRT\par \par developed lower urinary tract symptoms\par refractory to tamsulosin \par \par requesting Silodosin\par \par 3/17/21\par \par test    296 \par test f  60.8\par \par

## 2021-05-06 ENCOUNTER — APPOINTMENT (OUTPATIENT)
Dept: UROLOGY | Facility: CLINIC | Age: 71
End: 2021-05-06
Payer: MEDICARE

## 2021-05-06 VITALS
TEMPERATURE: 97 F | BODY MASS INDEX: 37.47 KG/M2 | HEART RATE: 75 BPM | HEIGHT: 69 IN | SYSTOLIC BLOOD PRESSURE: 135 MMHG | WEIGHT: 253 LBS | DIASTOLIC BLOOD PRESSURE: 79 MMHG

## 2021-05-06 DIAGNOSIS — R97.20 ELEVATED PROSTATE, SPECIFIC ANTIGEN [PSA]: ICD-10-CM

## 2021-05-06 DIAGNOSIS — N52.9 MALE ERECTILE DYSFUNCTION, UNSPECIFIED: ICD-10-CM

## 2021-05-06 DIAGNOSIS — R79.89 OTHER SPECIFIED ABNORMAL FINDINGS OF BLOOD CHEMISTRY: ICD-10-CM

## 2021-05-06 PROCEDURE — 99214 OFFICE O/P EST MOD 30 MIN: CPT

## 2021-05-06 PROCEDURE — 99072 ADDL SUPL MATRL&STAF TM PHE: CPT

## 2021-05-06 NOTE — PHYSICAL EXAM
[General Appearance - Well Developed] : well developed [General Appearance - Well Nourished] : well nourished [Normal Appearance] : normal appearance [Well Groomed] : well groomed [General Appearance - In No Acute Distress] : no acute distress [FreeTextEntry1] : obese [] : no respiratory distress [Respiration, Rhythm And Depth] : normal respiratory rhythm and effort [Exaggerated Use Of Accessory Muscles For Inspiration] : no accessory muscle use [Oriented To Time, Place, And Person] : oriented to person, place, and time [Affect] : the affect was normal [Mood] : the mood was normal [Not Anxious] : not anxious [Normal Station and Gait] : the gait and station were normal for the patient's age

## 2021-05-06 NOTE — LETTER HEADER
[FreeTextEntry3] : Sheryl Shepard M.D.\par Director of Urology\par [facility]\par [address] [place] [address], [address]\[place] [address]

## 2021-05-06 NOTE — HISTORY OF PRESENT ILLNESS
[FreeTextEntry1] : Braydon is a 71-year-old male who has been seen for erectile dysfunction, he was requested to get [place] criteria classification as he can respond though not as reliably as he would like to PDE 5 inhibitors.\par \par In addition his testosterone levels though his lab records the nodes within normal limits as I reviewed with him they drop the range of normal to near castrate in men once that he had 68 years of age if we take the levels for someone who is just under 70 his levels are quite low.  However he just recently finished XRT for prostate cancer, December 2020 his PSA is still in the process of optimizing and I wanted to know what the risks were for testosterone replacement therapy as that often helps the response and erectile dysfunction.\par \par Finally given his age weight and comorbid conditions I wanted [place] criteria classification.  He was somewhat reluctant to speak to his primary care doctor because she is female told to go to his cardiologist but I needed from someone.\par \par He saw Dr. Upton and is now here to review his recommendations.\par \par Finally he has voiding dysfunction but he says he is comfortable enough taking tamsulosin in the morning and Rapaflo at night not a usual protocol I am comfortable with that [Currently Experiencing ___] :  [unfilled] [Urinary Incontinence] : urinary incontinence [Urinary Urgency] : urinary urgency [Nocturia] : nocturia [Straining] : straining [Weak Stream] : weak stream [Erectile Dysfunction] : Erectile Dysfunction

## 2021-05-06 NOTE — LETTER BODY
[Dear  ___] : Dear  [unfilled], [Courtesy Letter:] : I had the pleasure of seeing your patient, [unfilled], in my office today. [Please see my note below.] : Please see my note below. [Sincerely,] : Sincerely, [DrNereyda  ___] : Dr. GROVE [FreeTextEntry2] : Rickie Sorensen MD\par [address]\[place] [address]\par  [DrNereyda ___] : Dr. GROVE

## 2021-05-06 NOTE — ASSESSMENT
[FreeTextEntry1] : He is a little over 4 months since his external beam radiation therapy and the urologic oncologist does not know where he is going to settle out and he feels testosterone would obfuscated the issue.  He would recommend unless it is essential and life-threatening the for now we will hold off on testosterone replacement\par \par As far as his wish to get PDE 5 inhibitors I need [place] criteria classification, I understand he is getting it from a friend and that is working I do not know the relative safety and his friend is not an internist or cardiologist once he gets me a note from the internist and/or cardiologist you more than happy to see what I can do to get him a steady supply\par \par Again as far as his voiding dysfunction he does have some urgency and frequency but he is comfortable enough and was still waiting for his prostate to have maximal effect from the radiation it may shrink enough that his symptoms will become less bothersome

## 2021-06-07 ENCOUNTER — APPOINTMENT (OUTPATIENT)
Dept: CARDIOLOGY | Facility: CLINIC | Age: 71
End: 2021-06-07

## 2021-07-28 ENCOUNTER — APPOINTMENT (OUTPATIENT)
Dept: RADIATION ONCOLOGY | Facility: HOSPITAL | Age: 71
End: 2021-07-28
Payer: MEDICARE

## 2021-07-28 VITALS
DIASTOLIC BLOOD PRESSURE: 67 MMHG | HEART RATE: 81 BPM | SYSTOLIC BLOOD PRESSURE: 143 MMHG | RESPIRATION RATE: 16 BRPM | OXYGEN SATURATION: 95 % | WEIGHT: 245.2 LBS | BODY MASS INDEX: 36.21 KG/M2 | TEMPERATURE: 97 F

## 2021-07-28 PROCEDURE — 99212 OFFICE O/P EST SF 10 MIN: CPT

## 2021-07-28 NOTE — PHYSICAL EXAM
[Obese] : obese [Sclera] : the sclera and conjunctiva were normal [Normal] : normal heart rate and rhythm, normal S1 and S2, and no murmurs present [Murmurs] : no murmurs present [Edema] : no peripheral edema present [Abdomen Tenderness] : non-tender [Nail Clubbing] : no clubbing  or cyanosis of the fingernails [de-identified] : BMI is 35 [de-identified] : Obese [FreeTextEntry1] : The prostate is flat without suspicious nodularity or induration.  Sulci are distinct.

## 2021-07-28 NOTE — VITALS
[Pain Description/Quality: ___] : Pain description/quality: [unfilled] [Pain Duration: ___] : Pain duration: [unfilled] [Pain Location: ___] : Pain Location: [unfilled] [Pain Interferes with ADLs] : Pain does not interfere with activities of daily living [80: Normal activity with effort; some signs or symptoms of disease.] : 80: Normal activity with effort; some signs or symptoms of disease.

## 2021-07-28 NOTE — DISEASE MANAGEMENT
[Clinical] : TNM Stage: c [IIA] : IIA [FreeTextEntry4] : Adenocarcinoma of the prostate [TTNM] : 1c [NTNM] : 0 [MTNM] : 0 [de-identified] : prostate

## 2021-07-28 NOTE — PHYSICAL EXAM
[Obese] : obese [Sclera] : the sclera and conjunctiva were normal [Normal] : normal heart rate and rhythm, normal S1 and S2, and no murmurs present [Murmurs] : no murmurs present [Edema] : no peripheral edema present [Abdomen Tenderness] : non-tender [Nail Clubbing] : no clubbing  or cyanosis of the fingernails [de-identified] : BMI is 35 [de-identified] : Obese [FreeTextEntry1] : The prostate is flat without suspicious nodularity or induration.  Sulci are distinct.

## 2021-07-28 NOTE — HISTORY OF PRESENT ILLNESS
[FreeTextEntry1] : 07/28/2021 7 month f/u for SBRT to the prostate. Presently patient denies pain while urinating, only complaint is frequency and urgency. Recently he has had some abdominal discomfort, and constipation. He does get relief after a bowel movement. He is taking tamsulosin at HS.   PSA from 4/3 was 1.3.  \par \par \par \par 1/25/2021 One month f/u for SBRT to the prostate. Presently patient denies pain while urinating, only complaint is frequency and urgency. Recently he has had some abdominal discomfort, and constipation. He does get relief after a bowel movement.  He is taking tamsulosin at HS.  \par \par  Patient is a 70 year old gentlemen whose PSA was 2.4 in 2/18.  It then went to 4.6 in 12/19.     Given that he is off and on with Avodart, the true behavior is more opaque.  An MRI done in Feb 2020 only noted a low risk (PI-RADS 2) lesion. The gland measured 58 ml.  However the PSA continued to rise and was 7.15 on 8/2/2020. by August the 4K was quite elevated at 63%.  He is  s/p core biopsy on 9/23/2020 and pathology shows adenocarcinoma of the prostate, right anterior with a [place] score 3 + 4 = 7 involving 50 % (6 mm in length) 1 of 12 cores positive.  \par \par PSA \par 8/2/2020 - 7.15 ng/mL\par 7/16/2020 -  6.1 ng/mL\par 1/30/2020 - 5.2 ng/mL\par 12/12/2019 - 4.6 ng/mL\par 2/26/2018 - 2.4 ng/mL\par \par Pt reports of urinary frequency, will get up 3-5 times during the night, weak stream and urinary urgency. worsening in the last 6 months.  He denies hematuria and dysuria. He states, "not really" sexually active, loss of interest.   \par Patient was seen by Dr Talamantes approximately 3 weeks ago for surgical interventions.  Pt and Pt's son reports,  they would rather avoid surgery and want to discuss radiotherapy.   \par Urologist  Dr. Shepard/Dr. Stevens\par \par 12/14/2020: follow up: Patient reports of urinary hesitation and dribbling.  He denies dysuria, foul smell on urination, CP and SOB.\par \par

## 2021-07-28 NOTE — DISEASE MANAGEMENT
[Clinical] : TNM Stage: c [IIA] : IIA [FreeTextEntry4] : Adenocarcinoma of the prostate [TTNM] : 1c [NTNM] : 0 [MTNM] : 0 [de-identified] : prostate

## 2021-07-28 NOTE — HISTORY OF PRESENT ILLNESS
[FreeTextEntry1] : 07/28/2021 7 month f/u for SBRT to the prostate. Presently patient denies pain while urinating, only complaint is frequency and urgency. Recently he has had some abdominal discomfort, and constipation. He does get relief after a bowel movement. He is taking tamsulosin at HS.   PSA from 4/3 was 1.3.  \par \par \par \par 1/25/2021 One month f/u for SBRT to the prostate. Presently patient denies pain while urinating, only complaint is frequency and urgency. Recently he has had some abdominal discomfort, and constipation. He does get relief after a bowel movement.  He is taking tamsulosin at HS.  \par \par  Patient is a 70 year old gentlemen whose PSA was 2.4 in 2/18.  It then went to 4.6 in 12/19.     Given that he is off and on with Avodart, the true behavior is more opaque.  An MRI done in Feb 2020 only noted a low risk (PI-RADS 2) lesion. The gland measured 58 ml.  However the PSA continued to rise and was 7.15 on 8/2/2020. by August the 4K was quite elevated at 63%.  He is  s/p core biopsy on 9/23/2020 and pathology shows adenocarcinoma of the prostate, right anterior with a [place] score 3 + 4 = 7 involving 50 % (6 mm in length) 1 of 12 cores positive.  \par \par PSA \par 8/2/2020 - 7.15 ng/mL\par 7/16/2020 -  6.1 ng/mL\par 1/30/2020 - 5.2 ng/mL\par 12/12/2019 - 4.6 ng/mL\par 2/26/2018 - 2.4 ng/mL\par \par Pt reports of urinary frequency, will get up 3-5 times during the night, weak stream and urinary urgency. worsening in the last 6 months.  He denies hematuria and dysuria. He states, "not really" sexually active, loss of interest.   \par Patient was seen by Dr Talamantes approximately 3 weeks ago for surgical interventions.  Pt and Pt's son reports,  they would rather avoid surgery and want to discuss radiotherapy.   \par Urologist  Dr. Shepard/Dr. Stevens\par \par 12/14/2020: follow up: Patient reports of urinary hesitation and dribbling.  He denies dysuria, foul smell on urination, CP and SOB.\par \par

## 2021-10-16 ENCOUNTER — OUTPATIENT (OUTPATIENT)
Dept: OUTPATIENT SERVICES | Facility: HOSPITAL | Age: 71
LOS: 1 days | Discharge: HOME | End: 2021-10-16
Payer: MEDICARE

## 2021-10-16 DIAGNOSIS — H26.9 UNSPECIFIED CATARACT: Chronic | ICD-10-CM

## 2021-10-16 DIAGNOSIS — C61 MALIGNANT NEOPLASM OF PROSTATE: ICD-10-CM

## 2021-10-18 ENCOUNTER — APPOINTMENT (OUTPATIENT)
Dept: CARDIOLOGY | Facility: CLINIC | Age: 71
End: 2021-10-18

## 2021-11-22 ENCOUNTER — OUTPATIENT (OUTPATIENT)
Dept: OUTPATIENT SERVICES | Facility: HOSPITAL | Age: 71
LOS: 1 days | Discharge: HOME | End: 2021-11-22

## 2021-11-22 DIAGNOSIS — C61 MALIGNANT NEOPLASM OF PROSTATE: ICD-10-CM

## 2021-11-22 DIAGNOSIS — H26.9 UNSPECIFIED CATARACT: Chronic | ICD-10-CM

## 2021-11-22 PROCEDURE — 72195 MRI PELVIS W/O DYE: CPT | Mod: 26,MH

## 2021-12-02 ENCOUNTER — APPOINTMENT (OUTPATIENT)
Dept: UROLOGY | Facility: CLINIC | Age: 71
End: 2021-12-02

## 2021-12-07 ENCOUNTER — LABORATORY RESULT (OUTPATIENT)
Age: 71
End: 2021-12-07

## 2021-12-07 ENCOUNTER — APPOINTMENT (OUTPATIENT)
Dept: HEMATOLOGY ONCOLOGY | Facility: CLINIC | Age: 71
End: 2021-12-07
Payer: MEDICARE

## 2021-12-07 VITALS
OXYGEN SATURATION: 97 % | HEART RATE: 84 BPM | BODY MASS INDEX: 35.84 KG/M2 | HEIGHT: 69 IN | DIASTOLIC BLOOD PRESSURE: 71 MMHG | SYSTOLIC BLOOD PRESSURE: 155 MMHG | WEIGHT: 242 LBS | TEMPERATURE: 97.6 F

## 2021-12-07 DIAGNOSIS — R97.20 ELEVATED PROSTATE, SPECIFIC ANTIGEN [PSA]: ICD-10-CM

## 2021-12-07 DIAGNOSIS — R35.0 FREQUENCY OF MICTURITION: ICD-10-CM

## 2021-12-07 DIAGNOSIS — Z87.891 PERSONAL HISTORY OF NICOTINE DEPENDENCE: ICD-10-CM

## 2021-12-07 LAB
ALBUMIN SERPL ELPH-MCNC: 4.8 G/DL
ALP BLD-CCNC: 74 U/L
ALT SERPL-CCNC: 14 U/L
ANION GAP SERPL CALC-SCNC: 16 MMOL/L
AST SERPL-CCNC: 20 U/L
BILIRUB DIRECT SERPL-MCNC: <0.2 MG/DL
BILIRUB INDIRECT SERPL-MCNC: >0.5 MG/DL
BILIRUB SERPL-MCNC: 0.7 MG/DL
BUN SERPL-MCNC: 20 MG/DL
CALCIUM SERPL-MCNC: 9.4 MG/DL
CHLORIDE SERPL-SCNC: 104 MMOL/L
CO2 SERPL-SCNC: 21 MMOL/L
CREAT SERPL-MCNC: 1.5 MG/DL
ESTIMATED AVERAGE GLUCOSE: 131 MG/DL
GLUCOSE SERPL-MCNC: 122 MG/DL
HBA1C MFR BLD HPLC: 6.2 %
HCT VFR BLD CALC: 40.1 %
HGB BLD-MCNC: 13.2 G/DL
IRON SATN MFR SERPL: 29 %
IRON SERPL-MCNC: 89 UG/DL
MCHC RBC-ENTMCNC: 28.9 PG
MCHC RBC-ENTMCNC: 32.9 G/DL
MCV RBC AUTO: 87.7 FL
PLATELET # BLD AUTO: 149 K/UL
PMV BLD: 10.3 FL
POTASSIUM SERPL-SCNC: 4.6 MMOL/L
PROT SERPL-MCNC: 7.8 G/DL
RBC # BLD: 4.57 M/UL
RBC # FLD: 12 %
SODIUM SERPL-SCNC: 141 MMOL/L
TIBC SERPL-MCNC: 303 UG/DL
UIBC SERPL-MCNC: 214 UG/DL
WBC # FLD AUTO: 4.44 K/UL

## 2021-12-07 PROCEDURE — 99205 OFFICE O/P NEW HI 60 MIN: CPT

## 2021-12-07 NOTE — ASSESSMENT
[FreeTextEntry1] : 70 yo man with ECOG 1 diagnosed with # Adenocarcinoma of the prostate in 09/2020, Grade Group 2, (David score 3+4=7) ; s/p RT with Dr. Bowers in 12/2020, 14 DOSES. PSA 01/2021 was 2 and in 04/2021 1.4 and 09/2021 : 2.04\par - reviewed radiology, pathology and lab workup and had a discussion regarding implications of diagnosis, prognosis and options for management which may include close observation depending on remainder of workup vs surgical evaluation \par - PSA is slowly rising ; will check CBC, BMP, LFTs, PSA, UA, testosterone level, iron studies, ferritin, B12/folate, MMA level today\par - followup with URO, Dr. Reveles, next available regarding bothersome LUTs which are not new\par - followup with [facility], Dr. Bowers, as indicated\par - will send for Bone Scan to r/o bony involvement ; Rx given\par if PSA is stable, and\par  no bone mets on bone scan will observe\par if psa is rising and no bone mets on bone scan , will need bx;: \par a)if bx is negative, will observe or ADT\par B) if bx is postive, will recommend prostatectomy\par - if bone mets are present, will start ADT\par \par # Anemia, mild, normocytic \par - labwork today\par - will continue to monitor\par \par RTC in 2-3 weeks, sooner if needed

## 2021-12-07 NOTE — CONSULT LETTER
[Dear  ___] : Dear  [unfilled], [Consult Letter:] : I had the pleasure of evaluating your patient, [unfilled]. [Please see my note below.] : Please see my note below. [Sincerely,] : Sincerely, [FreeTextEntry3] : Randy Pulido

## 2021-12-07 NOTE — RESULTS/DATA
[FreeTextEntry1] : (9.17.2020) \par Surgical Final Report\par \par \par \par \par Final Diagnosis\par \par 1. Prostate, right posterior median apex, biopsy\par - Benign prostatic tissue\par \par 2. Prostate, right posterior median base, biopsy\par - Benign prostatic tissue\par \par 3. Prostate, right posterior lateral apex, biopsy\par - Benign prostatic tissue\par \par 4. Prostate, right posterior lateral base, biopsy\par - Benign prostatic tissue\par \par 5. Prostate, right lateral, biopsy\par - Benign prostatic tissue\par \par 6. Prostate, right anterior, biopsy\par -  Adenocarcinoma of the prostate, Prognostic Grade Group 2\par (David score 3+4=7) involving 50% (6 mm in length) of 1 of 1\par core(s). [place] pattern 4 involves 10% of the tumor.\par \par 7. Prostate, left posterior medial apex, biopsy\par - Benign prostatic tissue\par \par 8. Prostate, left posterior medial base, biopsy\par - Benign prostatic tissue\par \par 9. Prostate, left posterior lateral apex, biopsy\par - Benign prostatic tissue\par \par 10. Prostate, left posterior lateral base, biopsy\par - Fibromuscular tissue\par \par 11. Prostate, left lateral, biopsy\par - Benign prostatic tissue\par \par 12. Prostate, left anterior, biopsy\par - Benign prostatic tissue\par \par Dr. Corin Stevens was notified of the diagnosis on 09/23/20.\par \par \par \par \par \par \par \par CORIN STOUT                       3\par \par \par \par Surgical Final Report\par \par \par \par \par Case reported to Tumor Registry.\par \par Verified by: Rosa Adams M.D.\par (Electronic Signature)\par Reported on: 09/23/20 12:29 EDT, [address],\[place] [address]\par Phone: [phone]   Fax: [phone]\par _________________________________________________________________\par \par Clinical Information\par R97.20\par PSA elevation, negative MRI\par Surgical procedure: Transperineal prostate biopsy\par \par Gross Description\par 1.  Specimen is received in formalin in container labeled as\par "right posterior medial apex" : Consists of a single core of\par white-tan tissue measuring 0.5 x 0.1 cm. All submitted in one\par cassette.\par \par 2.  Specimen is received in formalin in container labeled as\par "right posterior medial base": Consists of a single core of\par white-tan tissue measuring 0.6 x 0.1 cm. All submitted in one\par cassette.\par \par 3.  Specimen is received in formalin in container labeled as\par "right posterior lateral apex": Consists of a single core of\par white-tan tissue measuring 0.5 x 0.1 cm. All submitted in one\par cassette.\par \par 4.  Specimen is received in formalin in container labeled as\par "right posterior lateral base": Consists of a single core of\par white-tan tissue measuring 0.4 x 0.1 cm. All submitted in one\par cassette.\par \par 5. Specimen is received in formalin in container labeled as\par "right lateral": Consists of a single core of white-tan tissue\par measuring 0.7 x 0.1 cm. All submitted in one cassette.\par \par 6.  Specimen is received in formalin in container labeled as\par "right anterior": Consists of a single core of white-tan tissue\par measuring 1.4 x 0.1 cm. All submitted in one cassette.\par \par 7.  Specimen is received in formalin in container labeled as\par "left posterior medial apex" : Consists of a single core of\par white-tan tissue measuring 0.6 x 0.1 cm. All submitted in one\par cassette.\par \par \par \par \par \par \par CORIN STOUT                       3\par \par \par \par Surgical Final Report\par \par \par \par \par 8.  Specimen is received in formalin in container labeled as\par "left posterior medial base": Consists of a single core of white-\par tan tissue measuring 0.5 x 0.1 cm. All submitted in one cassette.\par \par 9.  Specimen is received in formalin in container labeled as\par "left posterior lateral apex": Consists of a single core of\par white-tan tissue measuring 0.5 x 0.1 cm. All submitted in one\par cassette.\par \par 10.  Specimen is received in formalin in container labeled as\par "left posterior lateral base": Consists of a single fragment of\par white-tan tissue measuring 0.2 x 0.1 cm. All submitted in one\par cassette.\par \par 11. Specimen is received in formalin in container labeled as\par "left lateral": Consists of a single core of white-tan tissue\par measuring 0.8 x 0.1 cm. All submitted in one cassette.\par \par 12.  Specimen is received in formalin in container labeled as\par "left anterior: Consists of a single core of white-tan tissue\par measuring 0.4 x 0.1 cm. All submitted in one cassette.\par \par All cores are inked with Eosin.

## 2021-12-07 NOTE — REVIEW OF SYSTEMS
[Recent Change In Weight] : ~T recent weight change [Abdominal Pain] : abdominal pain [Depression] : depression [Negative] : Allergic/Immunologic [Fever] : no fever [Chills] : no chills [Easy Bleeding] : no tendency for easy bleeding [FreeTextEntry2] : 6lb weight loss over the last 3-6 months [FreeTextEntry7] : occasional lower abd pain / around waist [FreeTextEntry8] : urinary frequency [FreeTextEntry9] : reports pain around waist line and around lowed abd [de-identified] : reports short term memory issues x 1 year

## 2021-12-07 NOTE — HISTORY OF PRESENT ILLNESS
[de-identified] : Mr. CORIN STOUT is a 71 year old male here today for evaluation and management of Prostate Cancer.  \par \par He was referred by URO, Dr. Reveles.  CORIN is a 71 year old M of [place] descent with PMHx including CKD, dyslipidemia, HTN, LPRD, LBBB and prostate cancer, who presents to clinic to establish care.   His PSA was 2.4 in 2/18. It then went to 4.6 in 12/19.  Given that he was off and on with Avodart, the true behavior is more opaque.  An MRI done in Feb 2020 only noted a low risk (PI-RADS 2) lesion.  However the PSA continued to rise and was 7.15 on 8/2/2020. by August the 4K was quite elevated at 63%. He is s/p core biopsy on 9/23/2020 and pathology shows adenocarcinoma of the prostate, right anterior with a David score 3 + 4 = 7 involving 50 % (6 mm in length) 1 of 12 cores positive.  At that time, patient and son made decision he would rather avoid surgery and want to discuss radiotherapy.  Patient completed RT with Dr. Bowers in 12/2020.  PSA is slowly rising, now up to 2.05ng/mL from 09/2021.  Patient denies fever, chills, nausea, vomiting, dyspnea, unintentional weight loss or hematuria.  Patient transiently developed urinary hesitancy following RT which resolved.  He still reports urinary frequency now.  Patient reports no pertinent family history of malignancy or blood/clotting disorder. \par \par RADIOLOGIC WORKUP\par MR Prostate (11.22.2021) IMPRESSION:1. No MRI suspicious prostate lesions; increased diffuse homogeneous low T2 signal within the peripheral zone, without corresponding diffusion abnormality, likely reflecting sequela of radiation fibrosis/atrophy. 2. Since November 23, 2020, no significant change in hemorrhagic and atrophic right-sided seminal vesicles.\par MR Prostate (11.23.2020) IMPRESSION:1.  Known clinically significant prostate cancer is not readily evident by MRI. No gross evidence of extraprostatic tumor on this noncontrast study. Moderate BPH.2.  No pelvic lymphadenopathy. 3.  Interval signal change of the right seminal vesicle, may represent postbiopsy or postinflammatory change.\par MR Prostate (3.24.2020) IMPRESSION:PI-RADSv2 Category 2 - Low (clinically significant cancer is unlikely to be present).  No dominant lesion. Moderate BPH.\par \par LAB WORKUP\par (9.10.2021) PSA 2.05\par (4.3.2021) PSA 1.3\par (1.25.2021) PSA 2.67\par (8.2.2020)  PSA 7.15 ng/mL\par (7.16.2020) PSA 6.1 ng/mL\par (3.3.2020) WBC 5.2, Hgb 13.7, Hct 41.2, MCV 89.2, \par (1.30.2020)  PSA 5.2 ng/mL\par (19.20.2019) WBC 5.35, Hgb 13.3, Hct 41.2, MCV 91.2, , Hep A (+)\par (11.21.2018) WBC 4.81, Hgb 12.9, Hct 40.9, MCV 89.7, \par (1.12.2017) WBC 6.45, Hgb 12.1, Hct 38.6, MCV 91.7, \par \par PATHOLOGY\par (see results section)\par \par HCM\par Colonoscopy done around 2018 with Dr. Flynn, due in April 2022\par Upper Endoscopy done around 2018 with Dr. Flynn , pt not aware of results\par COVID Vaccinated (x3)

## 2021-12-07 NOTE — PHYSICAL EXAM
[Restricted in physically strenuous activity but ambulatory and able to carry out work of a light or sedentary nature] : Status 1- Restricted in physically strenuous activity but ambulatory and able to carry out work of a light or sedentary nature, e.g., light house work, office work [Obese] : obese [Normal] : affect appropriate [de-identified] : rounded

## 2021-12-08 LAB
APPEARANCE: CLEAR
BILIRUBIN URINE: NEGATIVE
BLOOD URINE: NEGATIVE
COLOR: NORMAL
FERRITIN SERPL-MCNC: 1871 NG/ML
FOLATE SERPL-MCNC: 8.7 NG/ML
GLUCOSE QUALITATIVE U: NEGATIVE
KETONES URINE: NEGATIVE
LEUKOCYTE ESTERASE URINE: NEGATIVE
NITRITE URINE: NEGATIVE
PH URINE: 6.5
PROTEIN URINE: NEGATIVE
PSA FREE FLD-MCNC: 6 %
PSA FREE SERPL-MCNC: 0.16 NG/ML
PSA SERPL-MCNC: 2.69 NG/ML
SPECIFIC GRAVITY URINE: 1.02
TESTOST SERPL-MCNC: <2.5 NG/DL
UROBILINOGEN URINE: NORMAL
VIT B12 SERPL-MCNC: 780 PG/ML

## 2021-12-09 ENCOUNTER — APPOINTMENT (OUTPATIENT)
Dept: UROLOGY | Facility: CLINIC | Age: 71
End: 2021-12-09

## 2021-12-10 ENCOUNTER — OUTPATIENT (OUTPATIENT)
Dept: OUTPATIENT SERVICES | Facility: HOSPITAL | Age: 71
LOS: 1 days | Discharge: HOME | End: 2021-12-10
Payer: MEDICARE

## 2021-12-10 ENCOUNTER — RESULT REVIEW (OUTPATIENT)
Age: 71
End: 2021-12-10

## 2021-12-10 DIAGNOSIS — C61 MALIGNANT NEOPLASM OF PROSTATE: ICD-10-CM

## 2021-12-10 DIAGNOSIS — H26.9 UNSPECIFIED CATARACT: Chronic | ICD-10-CM

## 2021-12-10 PROCEDURE — 78306 BONE IMAGING WHOLE BODY: CPT | Mod: 26,MH

## 2021-12-10 PROCEDURE — 78803 RP LOCLZJ TUM SPECT 1 AREA: CPT | Mod: 26

## 2021-12-13 LAB — METHYLMALONATE SERPL-SCNC: 1601 NMOL/L

## 2022-01-06 ENCOUNTER — OUTPATIENT (OUTPATIENT)
Dept: OUTPATIENT SERVICES | Facility: HOSPITAL | Age: 72
LOS: 1 days | Discharge: HOME | End: 2022-01-06

## 2022-01-06 ENCOUNTER — APPOINTMENT (OUTPATIENT)
Dept: HEMATOLOGY ONCOLOGY | Facility: CLINIC | Age: 72
End: 2022-01-06
Payer: MEDICARE

## 2022-01-06 DIAGNOSIS — H26.9 UNSPECIFIED CATARACT: Chronic | ICD-10-CM

## 2022-01-06 PROCEDURE — 99215 OFFICE O/P EST HI 40 MIN: CPT | Mod: 95

## 2022-01-07 NOTE — REASON FOR VISIT
[Initial Consultation] : an initial consultation [FreeTextEntry2] : Prostate Cancer [Home] : at home, [unfilled] , at the time of the visit. [Medical Office: ([facility])___] : at the medical office located in  [Verbal consent obtained from patient] : the patient, [unfilled]

## 2022-01-07 NOTE — REASON FOR VISIT
[Initial Consultation] : an initial consultation [FreeTextEntry2] : Prostate Cancer [Home] : at home, [unfilled] , at the time of the visit. [Medical Office: ([facility])___] : at the medical office located in  [Verbal consent obtained from patient] : the patient, [unfilled]

## 2022-01-07 NOTE — ASSESSMENT
[FreeTextEntry1] : 70 yo man with ECOG 1 diagnosed with # Adenocarcinoma of the prostate in 09/2020, Grade Group 2, ([place] score 3+4=7) ; s/p RT with Dr. Bowers in 12/2020, 14 DOSES. PSA 01/2021 was 2 and in 04/2021 1.4 and 09/2021 : 2.04\par - reviewed radiology, pathology and lab workup and had a discussion regarding implications of diagnosis, prognosis and options for management which may include close observation depending on remainder of workup vs surgical evaluation \par - PSA is slowly rising ; will check CBC, BMP, LFTs, PSA, UA, testosterone level, iron studies, ferritin, B12/folate, MMA level today\par - followup with URO, Dr. Reveles, next available regarding bothersome LUTs which are not new\par - followup with [facility], Dr. Bowers, as indicated\par \par IN SUMMARY:\par 70 YO man with prostate adenocarcinoma, grade group 2/christie 3+4, s/p RT 12/2020. PSA decreased after RT to low 2 and remained exactly the same both in January 2021 and December 2021. The decrease of PSA to zeros would be expected after the prostatectomy , not after RT. MRI prostate 11/2021 AND bone scan 12/2021 do not show any sign of disease; i do not suspect recurrence; repeat PSA now and in 3 months; f/u  re; urinary symptoms control \par \par he remains worried. i tired to reassure him will follow him closely, but at this time no clear evidence of recurrence\par \par # Anemia, due to B12 deficicency; MMA is high; startded both b12 and folate\par \par \par

## 2022-01-07 NOTE — REVIEW OF SYSTEMS
[Fever] : no fever [Chills] : no chills [Recent Change In Weight] : ~T recent weight change [Abdominal Pain] : abdominal pain [Depression] : depression [Easy Bleeding] : no tendency for easy bleeding [Negative] : Allergic/Immunologic [FreeTextEntry2] : 6lb weight loss over the last 3-6 months [FreeTextEntry7] : occasional lower abd pain / around waist [FreeTextEntry8] : urinary frequency [FreeTextEntry9] : reports pain around waist line and around lowed abd [de-identified] : reports short term memory issues x 1 year

## 2022-01-07 NOTE — HISTORY OF PRESENT ILLNESS
[de-identified] : bone scan 12/12/21: No definite evidence to suggest metastatic bone disease. [de-identified] : Mr. CORIN STOUT is a 71 year old male here today for evaluation and management of Prostate Cancer.  \par \par He was referred by URO, Dr. Reveles.  CORIN is a 71 year old M of [place] descent with PMHx including CKD, dyslipidemia, HTN, LPRD, LBBB and prostate cancer, who presents to clinic to establish care.   His PSA was 2.4 in 2/18. It then went to 4.6 in 12/19.  Given that he was off and on with Avodart, the true behavior is more opaque.  An MRI done in Feb 2020 only noted a low risk (PI-RADS 2) lesion.  However the PSA continued to rise and was 7.15 on 8/2/2020. by August the 4K was quite elevated at 63%. He is s/p core biopsy on 9/23/2020 and pathology shows adenocarcinoma of the prostate, right anterior with a David score 3 + 4 = 7 involving 50 % (6 mm in length) 1 of 12 cores positive.  At that time, patient and son made decision he would rather avoid surgery and want to discuss radiotherapy.  Patient completed RT with Dr. Bowers in 12/2020.  PSA is slowly rising, now up to 2.05ng/mL from 09/2021.  Patient denies fever, chills, nausea, vomiting, dyspnea, unintentional weight loss or hematuria.  Patient transiently developed urinary hesitancy following RT which resolved.  He still reports urinary frequency now.  Patient reports no pertinent family history of malignancy or blood/clotting disorder. \par \par RADIOLOGIC WORKUP\par MR Prostate (11.22.2021) IMPRESSION:1. No MRI suspicious prostate lesions; increased diffuse homogeneous low T2 signal within the peripheral zone, without corresponding diffusion abnormality, likely reflecting sequela of radiation fibrosis/atrophy. 2. Since November 23, 2020, no significant change in hemorrhagic and atrophic right-sided seminal vesicles.\par MR Prostate (11.23.2020) IMPRESSION:1.  Known clinically significant prostate cancer is not readily evident by MRI. No gross evidence of extraprostatic tumor on this noncontrast study. Moderate BPH.2.  No pelvic lymphadenopathy. 3.  Interval signal change of the right seminal vesicle, may represent postbiopsy or postinflammatory change.\par MR Prostate (3.24.2020) IMPRESSION:PI-RADSv2 Category 2 - Low (clinically significant cancer is unlikely to be present).  No dominant lesion. Moderate BPH.\par \par LAB WORKUP\par (9.10.2021) PSA 2.05\par (4.3.2021) PSA 1.3\par (1.25.2021) PSA 2.67\par (8.2.2020)  PSA 7.15 ng/mL\par (7.16.2020) PSA 6.1 ng/mL\par (3.3.2020) WBC 5.2, Hgb 13.7, Hct 41.2, MCV 89.2, \par (1.30.2020)  PSA 5.2 ng/mL\par (19.20.2019) WBC 5.35, Hgb 13.3, Hct 41.2, MCV 91.2, , Hep A (+)\par (11.21.2018) WBC 4.81, Hgb 12.9, Hct 40.9, MCV 89.7, \par (1.12.2017) WBC 6.45, Hgb 12.1, Hct 38.6, MCV 91.7, \par \par PATHOLOGY\par (see results section)\par \par HCM\par Colonoscopy done around 2018 with Dr. Flynn, due in April 2022\par Upper Endoscopy done around 2018 with Dr. Flynn , pt not aware of results\par COVID Vaccinated (x3)

## 2022-01-07 NOTE — RESULTS/DATA
[FreeTextEntry1] : (9.17.2020) \par Surgical Final Report\par \par \par \par \par Final Diagnosis\par \par 1. Prostate, right posterior median apex, biopsy\par - Benign prostatic tissue\par \par 2. Prostate, right posterior median base, biopsy\par - Benign prostatic tissue\par \par 3. Prostate, right posterior lateral apex, biopsy\par - Benign prostatic tissue\par \par 4. Prostate, right posterior lateral base, biopsy\par - Benign prostatic tissue\par \par 5. Prostate, right lateral, biopsy\par - Benign prostatic tissue\par \par 6. Prostate, right anterior, biopsy\par -  Adenocarcinoma of the prostate, Prognostic Grade Group 2\par (David score 3+4=7) involving 50% (6 mm in length) of 1 of 1\par core(s). [place] pattern 4 involves 10% of the tumor.\par \par 7. Prostate, left posterior medial apex, biopsy\par - Benign prostatic tissue\par \par 8. Prostate, left posterior medial base, biopsy\par - Benign prostatic tissue\par \par 9. Prostate, left posterior lateral apex, biopsy\par - Benign prostatic tissue\par \par 10. Prostate, left posterior lateral base, biopsy\par - Fibromuscular tissue\par \par 11. Prostate, left lateral, biopsy\par - Benign prostatic tissue\par \par 12. Prostate, left anterior, biopsy\par - Benign prostatic tissue\par \par Dr. Corin Stevens was notified of the diagnosis on 09/23/20.\par \par \par \par \par \par \par \par CORIN STOUT                       3\par \par \par \par Surgical Final Report\par \par \par \par \par Case reported to Tumor Registry.\par \par Verified by: Rosa Adams M.D.\par (Electronic Signature)\par Reported on: 09/23/20 12:29 EDT, [address],\[place] [address]\par Phone: [phone]   Fax: [phone]\par _________________________________________________________________\par \par Clinical Information\par R97.20\par PSA elevation, negative MRI\par Surgical procedure: Transperineal prostate biopsy\par \par Gross Description\par 1.  Specimen is received in formalin in container labeled as\par "right posterior medial apex" : Consists of a single core of\par white-tan tissue measuring 0.5 x 0.1 cm. All submitted in one\par cassette.\par \par 2.  Specimen is received in formalin in container labeled as\par "right posterior medial base": Consists of a single core of\par white-tan tissue measuring 0.6 x 0.1 cm. All submitted in one\par cassette.\par \par 3.  Specimen is received in formalin in container labeled as\par "right posterior lateral apex": Consists of a single core of\par white-tan tissue measuring 0.5 x 0.1 cm. All submitted in one\par cassette.\par \par 4.  Specimen is received in formalin in container labeled as\par "right posterior lateral base": Consists of a single core of\par white-tan tissue measuring 0.4 x 0.1 cm. All submitted in one\par cassette.\par \par 5. Specimen is received in formalin in container labeled as\par "right lateral": Consists of a single core of white-tan tissue\par measuring 0.7 x 0.1 cm. All submitted in one cassette.\par \par 6.  Specimen is received in formalin in container labeled as\par "right anterior": Consists of a single core of white-tan tissue\par measuring 1.4 x 0.1 cm. All submitted in one cassette.\par \par 7.  Specimen is received in formalin in container labeled as\par "left posterior medial apex" : Consists of a single core of\par white-tan tissue measuring 0.6 x 0.1 cm. All submitted in one\par cassette.\par \par \par \par \par \par \par CORIN STOUT                       3\par \par \par \par Surgical Final Report\par \par \par \par \par 8.  Specimen is received in formalin in container labeled as\par "left posterior medial base": Consists of a single core of white-\par tan tissue measuring 0.5 x 0.1 cm. All submitted in one cassette.\par \par 9.  Specimen is received in formalin in container labeled as\par "left posterior lateral apex": Consists of a single core of\par white-tan tissue measuring 0.5 x 0.1 cm. All submitted in one\par cassette.\par \par 10.  Specimen is received in formalin in container labeled as\par "left posterior lateral base": Consists of a single fragment of\par white-tan tissue measuring 0.2 x 0.1 cm. All submitted in one\par cassette.\par \par 11. Specimen is received in formalin in container labeled as\par "left lateral": Consists of a single core of white-tan tissue\par measuring 0.8 x 0.1 cm. All submitted in one cassette.\par \par 12.  Specimen is received in formalin in container labeled as\par "left anterior: Consists of a single core of white-tan tissue\par measuring 0.4 x 0.1 cm. All submitted in one cassette.\par \par All cores are inked with Eosin.

## 2022-01-07 NOTE — RESULTS/DATA
[FreeTextEntry1] : (9.17.2020) \par Surgical Final Report\par \par \par \par \par Final Diagnosis\par \par 1. Prostate, right posterior median apex, biopsy\par - Benign prostatic tissue\par \par 2. Prostate, right posterior median base, biopsy\par - Benign prostatic tissue\par \par 3. Prostate, right posterior lateral apex, biopsy\par - Benign prostatic tissue\par \par 4. Prostate, right posterior lateral base, biopsy\par - Benign prostatic tissue\par \par 5. Prostate, right lateral, biopsy\par - Benign prostatic tissue\par \par 6. Prostate, right anterior, biopsy\par -  Adenocarcinoma of the prostate, Prognostic Grade Group 2\par (David score 3+4=7) involving 50% (6 mm in length) of 1 of 1\par core(s). [place] pattern 4 involves 10% of the tumor.\par \par 7. Prostate, left posterior medial apex, biopsy\par - Benign prostatic tissue\par \par 8. Prostate, left posterior medial base, biopsy\par - Benign prostatic tissue\par \par 9. Prostate, left posterior lateral apex, biopsy\par - Benign prostatic tissue\par \par 10. Prostate, left posterior lateral base, biopsy\par - Fibromuscular tissue\par \par 11. Prostate, left lateral, biopsy\par - Benign prostatic tissue\par \par 12. Prostate, left anterior, biopsy\par - Benign prostatic tissue\par \par Dr. Corin Stevens was notified of the diagnosis on 09/23/20.\par \par \par \par \par \par \par \par CORIN STOUT                       3\par \par \par \par Surgical Final Report\par \par \par \par \par Case reported to Tumor Registry.\par \par Verified by: Rosa Adams M.D.\par (Electronic Signature)\par Reported on: 09/23/20 12:29 EDT, [address] [facility] [facility],\[place] [address]\par Phone: [phone]   Fax: [phone]\par _________________________________________________________________\par \par Clinical Information\par R97.20\par PSA elevation, negative MRI\par Surgical procedure: Transperineal prostate biopsy\par \par Gross Description\par 1.  Specimen is received in formalin in container labeled as\par "right posterior medial apex" : Consists of a single core of\par white-tan tissue measuring 0.5 x 0.1 cm. All submitted in one\par cassette.\par \par 2.  Specimen is received in formalin in container labeled as\par "right posterior medial base": Consists of a single core of\par white-tan tissue measuring 0.6 x 0.1 cm. All submitted in one\par cassette.\par \par 3.  Specimen is received in formalin in container labeled as\par "right posterior lateral apex": Consists of a single core of\par white-tan tissue measuring 0.5 x 0.1 cm. All submitted in one\par cassette.\par \par 4.  Specimen is received in formalin in container labeled as\par "right posterior lateral base": Consists of a single core of\par white-tan tissue measuring 0.4 x 0.1 cm. All submitted in one\par cassette.\par \par 5. Specimen is received in formalin in container labeled as\par "right lateral": Consists of a single core of white-tan tissue\par measuring 0.7 x 0.1 cm. All submitted in one cassette.\par \par 6.  Specimen is received in formalin in container labeled as\par "right anterior": Consists of a single core of white-tan tissue\par measuring 1.4 x 0.1 cm. All submitted in one cassette.\par \par 7.  Specimen is received in formalin in container labeled as\par "left posterior medial apex" : Consists of a single core of\par white-tan tissue measuring 0.6 x 0.1 cm. All submitted in one\par cassette.\par \par \par \par \par \par \par CORIN STOUT                       3\par \par \par \par Surgical Final Report\par \par \par \par \par 8.  Specimen is received in formalin in container labeled as\par "left posterior medial base": Consists of a single core of white-\par tan tissue measuring 0.5 x 0.1 cm. All submitted in one cassette.\par \par 9.  Specimen is received in formalin in container labeled as\par "left posterior lateral apex": Consists of a single core of\par white-tan tissue measuring 0.5 x 0.1 cm. All submitted in one\par cassette.\par \par 10.  Specimen is received in formalin in container labeled as\par "left posterior lateral base": Consists of a single fragment of\par white-tan tissue measuring 0.2 x 0.1 cm. All submitted in one\par cassette.\par \par 11. Specimen is received in formalin in container labeled as\par "left lateral": Consists of a single core of white-tan tissue\par measuring 0.8 x 0.1 cm. All submitted in one cassette.\par \par 12.  Specimen is received in formalin in container labeled as\par "left anterior: Consists of a single core of white-tan tissue\par measuring 0.4 x 0.1 cm. All submitted in one cassette.\par \par All cores are inked with Eosin.

## 2022-01-07 NOTE — ASSESSMENT
[FreeTextEntry1] : 70 yo man with ECOG 1 diagnosed with # Adenocarcinoma of the prostate in 09/2020, Grade Group 2, ([place] score 3+4=7) ; s/p RT with Dr. Bowers in 12/2020, 14 DOSES. PSA 01/2021 was 2 and in 04/2021 1.4 and 09/2021 : 2.04\par - reviewed radiology, pathology and lab workup and had a discussion regarding implications of diagnosis, prognosis and options for management which may include close observation depending on remainder of workup vs surgical evaluation \par - PSA is slowly rising ; will check CBC, BMP, LFTs, PSA, UA, testosterone level, iron studies, ferritin, B12/folate, MMA level today\par - followup with URO, Dr. Reveles, next available regarding bothersome LUTs which are not new\par - followup with [facility], Dr. Bowers, as indicated\par \par IN SUMMARY:\par 70 YO man with prostate adenocarcinoma, grade group 2/christie 3+4, s/p RT 12/2020. PSA decreased after RT to low 2 and remained exactly the same both in January 2021 and December 2021. The decrease of PSA to zeros would be expected after the prostatectomy , not after RT. MRI prostate 11/2021 AND bone scan 12/2021 do not show any sign of disease; i do not suspect recurrence; repeat PSA now and in 3 months; f/u  re; urinary symptoms control \par \par he remains worried. i tired to reassure him will follow him closely, but at this time no clear evidence of recurrence\par \par # Anemia, due to B12 deficicency; MMA is high; startded both b12 and folate\par \par \par

## 2022-01-07 NOTE — REVIEW OF SYSTEMS
[Fever] : no fever [Chills] : no chills [Recent Change In Weight] : ~T recent weight change [Abdominal Pain] : abdominal pain [Depression] : depression [Easy Bleeding] : no tendency for easy bleeding [Negative] : Allergic/Immunologic [FreeTextEntry2] : 6lb weight loss over the last 3-6 months [FreeTextEntry7] : occasional lower abd pain / around waist [FreeTextEntry8] : urinary frequency [FreeTextEntry9] : reports pain around waist line and around lowed abd [de-identified] : reports short term memory issues x 1 year

## 2022-01-07 NOTE — HISTORY OF PRESENT ILLNESS
[de-identified] : bone scan 12/12/21: No definite evidence to suggest metastatic bone disease. [de-identified] : Mr. CORIN STOUT is a 71 year old male here today for evaluation and management of Prostate Cancer.  \par \par He was referred by URO, Dr. Reveles.  CORIN is a 71 year old M of [place] descent with PMHx including CKD, dyslipidemia, HTN, LPRD, LBBB and prostate cancer, who presents to clinic to establish care.   His PSA was 2.4 in 2/18. It then went to 4.6 in 12/19.  Given that he was off and on with Avodart, the true behavior is more opaque.  An MRI done in Feb 2020 only noted a low risk (PI-RADS 2) lesion.  However the PSA continued to rise and was 7.15 on 8/2/2020. by August the 4K was quite elevated at 63%. He is s/p core biopsy on 9/23/2020 and pathology shows adenocarcinoma of the prostate, right anterior with a David score 3 + 4 = 7 involving 50 % (6 mm in length) 1 of 12 cores positive.  At that time, patient and son made decision he would rather avoid surgery and want to discuss radiotherapy.  Patient completed RT with Dr. Bowers in 12/2020.  PSA is slowly rising, now up to 2.05ng/mL from 09/2021.  Patient denies fever, chills, nausea, vomiting, dyspnea, unintentional weight loss or hematuria.  Patient transiently developed urinary hesitancy following RT which resolved.  He still reports urinary frequency now.  Patient reports no pertinent family history of malignancy or blood/clotting disorder. \par \par RADIOLOGIC WORKUP\par MR Prostate (11.22.2021) IMPRESSION:1. No MRI suspicious prostate lesions; increased diffuse homogeneous low T2 signal within the peripheral zone, without corresponding diffusion abnormality, likely reflecting sequela of radiation fibrosis/atrophy. 2. Since November 23, 2020, no significant change in hemorrhagic and atrophic right-sided seminal vesicles.\par MR Prostate (11.23.2020) IMPRESSION:1.  Known clinically significant prostate cancer is not readily evident by MRI. No gross evidence of extraprostatic tumor on this noncontrast study. Moderate BPH.2.  No pelvic lymphadenopathy. 3.  Interval signal change of the right seminal vesicle, may represent postbiopsy or postinflammatory change.\par MR Prostate (3.24.2020) IMPRESSION:PI-RADSv2 Category 2 - Low (clinically significant cancer is unlikely to be present).  No dominant lesion. Moderate BPH.\par \par LAB WORKUP\par (9.10.2021) PSA 2.05\par (4.3.2021) PSA 1.3\par (1.25.2021) PSA 2.67\par (8.2.2020)  PSA 7.15 ng/mL\par (7.16.2020) PSA 6.1 ng/mL\par (3.3.2020) WBC 5.2, Hgb 13.7, Hct 41.2, MCV 89.2, \par (1.30.2020)  PSA 5.2 ng/mL\par (19.20.2019) WBC 5.35, Hgb 13.3, Hct 41.2, MCV 91.2, , Hep A (+)\par (11.21.2018) WBC 4.81, Hgb 12.9, Hct 40.9, MCV 89.7, \par (1.12.2017) WBC 6.45, Hgb 12.1, Hct 38.6, MCV 91.7, \par \par PATHOLOGY\par (see results section)\par \par HCM\par Colonoscopy done around 2018 with Dr. Flynn, due in April 2022\par Upper Endoscopy done around 2018 with Dr. Flynn , pt not aware of results\par COVID Vaccinated (x3)

## 2022-01-10 ENCOUNTER — APPOINTMENT (OUTPATIENT)
Dept: HEMATOLOGY ONCOLOGY | Facility: CLINIC | Age: 72
End: 2022-01-10

## 2022-01-11 LAB — PSA SERPL-MCNC: 2.1 NG/ML

## 2022-01-31 ENCOUNTER — APPOINTMENT (OUTPATIENT)
Dept: NEUROLOGY | Facility: CLINIC | Age: 72
End: 2022-01-31

## 2022-02-14 ENCOUNTER — APPOINTMENT (OUTPATIENT)
Dept: HEMATOLOGY ONCOLOGY | Facility: CLINIC | Age: 72
End: 2022-02-14

## 2022-02-14 DIAGNOSIS — Z00.00 ENCOUNTER FOR GENERAL ADULT MEDICAL EXAMINATION W/OUT ABNORMAL FINDINGS: ICD-10-CM

## 2022-02-15 ENCOUNTER — OUTPATIENT (OUTPATIENT)
Dept: OUTPATIENT SERVICES | Facility: HOSPITAL | Age: 72
LOS: 1 days | Discharge: HOME | End: 2022-02-15

## 2022-02-15 ENCOUNTER — APPOINTMENT (OUTPATIENT)
Dept: HEMATOLOGY ONCOLOGY | Facility: CLINIC | Age: 72
End: 2022-02-15
Payer: MEDICARE

## 2022-02-15 VITALS
TEMPERATURE: 98.3 F | DIASTOLIC BLOOD PRESSURE: 89 MMHG | WEIGHT: 234 LBS | HEIGHT: 69 IN | HEART RATE: 83 BPM | OXYGEN SATURATION: 98 % | SYSTOLIC BLOOD PRESSURE: 166 MMHG | BODY MASS INDEX: 34.66 KG/M2

## 2022-02-15 DIAGNOSIS — D64.9 ANEMIA, UNSPECIFIED: ICD-10-CM

## 2022-02-15 DIAGNOSIS — R97.20 ELEVATED PROSTATE SPECIFIC ANTIGEN [PSA]: ICD-10-CM

## 2022-02-15 DIAGNOSIS — C61 MALIGNANT NEOPLASM OF PROSTATE: ICD-10-CM

## 2022-02-15 DIAGNOSIS — H26.9 UNSPECIFIED CATARACT: Chronic | ICD-10-CM

## 2022-02-15 DIAGNOSIS — R35.0 FREQUENCY OF MICTURITION: ICD-10-CM

## 2022-02-15 LAB — PSA SERPL-MCNC: 1.45 NG/ML

## 2022-02-15 PROCEDURE — 99215 OFFICE O/P EST HI 40 MIN: CPT

## 2022-02-15 NOTE — HISTORY OF PRESENT ILLNESS
[de-identified] : Mr. CORIN STOUT is a 71 year old male here today for evaluation and management of Prostate Cancer.  \par \par He was referred by URO, Dr. Reveles.  CORIN is a 71 year old M of [place] descent with PMHx including CKD, dyslipidemia, HTN, LPRD, LBBB and prostate cancer, who presents to clinic to establish care.   His PSA was 2.4 in 2/18. It then went to 4.6 in 12/19.  Given that he was off and on with Avodart, the true behavior is more opaque.  An MRI done in Feb 2020 only noted a low risk (PI-RADS 2) lesion.  However the PSA continued to rise and was 7.15 on 8/2/2020. by August the 4K was quite elevated at 63%. He is s/p core biopsy on 9/23/2020 and pathology shows adenocarcinoma of the prostate, right anterior with a David score 3 + 4 = 7 involving 50 % (6 mm in length) 1 of 12 cores positive.  At that time, patient and son made decision he would rather avoid surgery and want to discuss radiotherapy.  Patient completed RT with Dr. Bowers in 12/2020.  PSA is slowly rising, now up to 2.05ng/mL from 09/2021.  Patient denies fever, chills, nausea, vomiting, dyspnea, unintentional weight loss or hematuria.  Patient transiently developed urinary hesitancy following RT which resolved.  He still reports urinary frequency now.  Patient reports no pertinent family history of malignancy or blood/clotting disorder. \par \par RADIOLOGIC WORKUP\par bone scan (12.12.2021) IMPRESSION: No definite evidence to suggest metastatic bone disease.\par MR Prostate (11.22.2021) IMPRESSION:1. No MRI suspicious prostate lesions; increased diffuse homogeneous low T2 signal within the peripheral zone, without corresponding diffusion abnormality, likely reflecting sequela of radiation fibrosis/atrophy. 2. Since November 23, 2020, no significant change in hemorrhagic and atrophic right-sided seminal vesicles.\par MR Prostate (11.23.2020) IMPRESSION:1.  Known clinically significant prostate cancer is not readily evident by MRI. No gross evidence of extraprostatic tumor on this noncontrast study. Moderate BPH.2.  No pelvic lymphadenopathy. 3.  Interval signal change of the right seminal vesicle, may represent postbiopsy or postinflammatory change.\par MR Prostate (3.24.2020) IMPRESSION:PI-RADSv2 Category 2 - Low (clinically significant cancer is unlikely to be present).  No dominant lesion. Moderate BPH.\par \par LAB WORKUP\par (9.10.2021) PSA 2.05\par (4.3.2021) PSA 1.3\par (1.25.2021) PSA 2.67\par (8.2.2020)  PSA 7.15 ng/mL\par (7.16.2020) PSA 6.1 ng/mL\par (3.3.2020) WBC 5.2, Hgb 13.7, Hct 41.2, MCV 89.2, \par (1.30.2020)  PSA 5.2 ng/mL\par (19.20.2019) WBC 5.35, Hgb 13.3, Hct 41.2, MCV 91.2, , Hep A (+)\par (11.21.2018) WBC 4.81, Hgb 12.9, Hct 40.9, MCV 89.7, \par (1.12.2017) WBC 6.45, Hgb 12.1, Hct 38.6, MCV 91.7, \par (01.25.2021) PSA was 2 \par (04.2021) PSA was 1.4 \par (09.2021) PSA was 2.04\par (02.14.2022) PSA was 1.45\par \par PATHOLOGY\par (see results section)\par \par HCM\par Colonoscopy done around 2018 with Dr. Flynn, due in April 2022\par Upper Endoscopy done around 2018 with Dr. Flynn , pt not aware of results\par COVID Vaccinated (x3) [de-identified] : 2/15/22\par Patient is here for a follow-up visit for Prostate Cancer.  Reviewed most recent PSA which is stable at 1.45ng/mL.  Patient denies fever, chills, nausea, vomiting, dyspnea or bleeding.  He reports lower pelvic/bladder discomfort intermittently over the last 2 weeks or so.  He notes urinary frequency (up to 7x per day) and nocturia over the last few months.  He follows with URO, Dr. Reveles and has revisit pending for next week.  He does not currently receive ADT injections.

## 2022-02-15 NOTE — REVIEW OF SYSTEMS
[Recent Change In Weight] : ~T recent weight change [Abdominal Pain] : abdominal pain [Depression] : depression [Negative] : Allergic/Immunologic [Fever] : no fever [Chills] : no chills [Easy Bleeding] : no tendency for easy bleeding [FreeTextEntry2] : 6lb weight loss over the last 3-6 months [FreeTextEntry7] : still reports occasional lower abd pain / around waist [FreeTextEntry9] : reports pain around waist line and around lowed abd [FreeTextEntry8] : urinary frequency [de-identified] : reports short term memory issues x 1 year [de-identified] : reports erectile dysfunction following RT

## 2022-02-15 NOTE — ASSESSMENT
[FreeTextEntry1] : # Adenocarcinoma of the prostate in 09/2020, Grade Group 2, (David score 3+4=7) ; s/p RT with Dr. Bowers in 12/2020, 14 DOSES.\par ECOG 1\par - PSA after RT came down to 2 and remained exactly the same both in January 2021 and December 2021. \par - explained that decrease of PSA to zeros would be expected after the prostatectomy , not after RT. \par - MRI prostate 11/2021 AND bone scan 12/2021 do not show any sign of disease; I do not suspect recurrence \par - followup with URO, Dr. Reveles, next available regarding bothersome LUTs + ED\par - followup with [facility], Dr. Bowers, as indicated\par - he reportedly has PET/CT Auxumin scheduled for tomorrow ; explained that the preferred test should be PET/CT PSMA\par \par # Anemia, due to B12 deficiency; MMA previously high\par - c/w Vitamin B12 and folate supplementation , no refills needed\par \par RTC in 3 months with CBC, BMP, LFTs, PSA , sooner if needed

## 2022-02-15 NOTE — RESULTS/DATA
[FreeTextEntry1] : (9.17.2020) \par Surgical Final Report\par \par \par \par \par Final Diagnosis\par \par 1. Prostate, right posterior median apex, biopsy\par - Benign prostatic tissue\par \par 2. Prostate, right posterior median base, biopsy\par - Benign prostatic tissue\par \par 3. Prostate, right posterior lateral apex, biopsy\par - Benign prostatic tissue\par \par 4. Prostate, right posterior lateral base, biopsy\par - Benign prostatic tissue\par \par 5. Prostate, right lateral, biopsy\par - Benign prostatic tissue\par \par 6. Prostate, right anterior, biopsy\par -  Adenocarcinoma of the prostate, Prognostic Grade Group 2\par (David score 3+4=7) involving 50% (6 mm in length) of 1 of 1\par core(s). [place] pattern 4 involves 10% of the tumor.\par \par 7. Prostate, left posterior medial apex, biopsy\par - Benign prostatic tissue\par \par 8. Prostate, left posterior medial base, biopsy\par - Benign prostatic tissue\par \par 9. Prostate, left posterior lateral apex, biopsy\par - Benign prostatic tissue\par \par 10. Prostate, left posterior lateral base, biopsy\par - Fibromuscular tissue\par \par 11. Prostate, left lateral, biopsy\par - Benign prostatic tissue\par \par 12. Prostate, left anterior, biopsy\par - Benign prostatic tissue\par \par Dr. Corin Stevens was notified of the diagnosis on 09/23/20.\par \par \par \par \par \par \par \par CORIN STOUT                       3\par \par \par \par Surgical Final Report\par \par \par \par \par Case reported to Tumor Registry.\par \par Verified by: Rosa Adams M.D.\par (Electronic Signature)\par Reported on: 09/23/20 12:29 EDT, [address] [facility],\[place] [address]\par Phone: [phone]   Fax: [phone]\par _________________________________________________________________\par \par Clinical Information\par R97.20\par PSA elevation, negative MRI\par Surgical procedure: Transperineal prostate biopsy\par \par Gross Description\par 1.  Specimen is received in formalin in container labeled as\par "right posterior medial apex" : Consists of a single core of\par white-tan tissue measuring 0.5 x 0.1 cm. All submitted in one\par cassette.\par \par 2.  Specimen is received in formalin in container labeled as\par "right posterior medial base": Consists of a single core of\par white-tan tissue measuring 0.6 x 0.1 cm. All submitted in one\par cassette.\par \par 3.  Specimen is received in formalin in container labeled as\par "right posterior lateral apex": Consists of a single core of\par white-tan tissue measuring 0.5 x 0.1 cm. All submitted in one\par cassette.\par \par 4.  Specimen is received in formalin in container labeled as\par "right posterior lateral base": Consists of a single core of\par white-tan tissue measuring 0.4 x 0.1 cm. All submitted in one\par cassette.\par \par 5. Specimen is received in formalin in container labeled as\par "right lateral": Consists of a single core of white-tan tissue\par measuring 0.7 x 0.1 cm. All submitted in one cassette.\par \par 6.  Specimen is received in formalin in container labeled as\par "right anterior": Consists of a single core of white-tan tissue\par measuring 1.4 x 0.1 cm. All submitted in one cassette.\par \par 7.  Specimen is received in formalin in container labeled as\par "left posterior medial apex" : Consists of a single core of\par white-tan tissue measuring 0.6 x 0.1 cm. All submitted in one\par cassette.\par \par \par \par \par \par \par CORIN STOUT                       3\par \par \par \par Surgical Final Report\par \par \par \par \par 8.  Specimen is received in formalin in container labeled as\par "left posterior medial base": Consists of a single core of white-\par tan tissue measuring 0.5 x 0.1 cm. All submitted in one cassette.\par \par 9.  Specimen is received in formalin in container labeled as\par "left posterior lateral apex": Consists of a single core of\par white-tan tissue measuring 0.5 x 0.1 cm. All submitted in one\par cassette.\par \par 10.  Specimen is received in formalin in container labeled as\par "left posterior lateral base": Consists of a single fragment of\par white-tan tissue measuring 0.2 x 0.1 cm. All submitted in one\par cassette.\par \par 11. Specimen is received in formalin in container labeled as\par "left lateral": Consists of a single core of white-tan tissue\par measuring 0.8 x 0.1 cm. All submitted in one cassette.\par \par 12.  Specimen is received in formalin in container labeled as\par "left anterior: Consists of a single core of white-tan tissue\par measuring 0.4 x 0.1 cm. All submitted in one cassette.\par \par All cores are inked with Eosin.

## 2022-02-16 DIAGNOSIS — E53.8 DEFICIENCY OF OTHER SPECIFIED B GROUP VITAMINS: ICD-10-CM

## 2022-03-03 DIAGNOSIS — C61 MALIGNANT NEOPLASM OF PROSTATE: ICD-10-CM

## 2022-03-03 DIAGNOSIS — E53.8 DEFICIENCY OF OTHER SPECIFIED B GROUP VITAMINS: ICD-10-CM

## 2022-03-24 ENCOUNTER — OUTPATIENT (OUTPATIENT)
Dept: OUTPATIENT SERVICES | Facility: HOSPITAL | Age: 72
LOS: 1 days | Discharge: HOME | End: 2022-03-24
Payer: MEDICARE

## 2022-03-24 DIAGNOSIS — C61 MALIGNANT NEOPLASM OF PROSTATE: ICD-10-CM

## 2022-03-24 DIAGNOSIS — H26.9 UNSPECIFIED CATARACT: Chronic | ICD-10-CM

## 2022-03-24 LAB — GLUCOSE BLDC GLUCOMTR-MCNC: 122 MG/DL — HIGH (ref 70–99)

## 2022-03-24 PROCEDURE — 78816 PET IMAGE W/CT FULL BODY: CPT | Mod: 26,PI

## 2022-04-25 ENCOUNTER — APPOINTMENT (OUTPATIENT)
Dept: CARDIOLOGY | Facility: CLINIC | Age: 72
End: 2022-04-25

## 2022-05-02 ENCOUNTER — APPOINTMENT (OUTPATIENT)
Dept: HEMATOLOGY ONCOLOGY | Facility: CLINIC | Age: 72
End: 2022-05-02

## 2022-05-02 ENCOUNTER — LABORATORY RESULT (OUTPATIENT)
Age: 72
End: 2022-05-02

## 2022-05-03 LAB
ALBUMIN SERPL ELPH-MCNC: 4.6 G/DL
ALP BLD-CCNC: 69 U/L
ALT SERPL-CCNC: 14 U/L
ANION GAP SERPL CALC-SCNC: 12 MMOL/L
AST SERPL-CCNC: 18 U/L
BILIRUB DIRECT SERPL-MCNC: <0.2 MG/DL
BILIRUB INDIRECT SERPL-MCNC: >0.4 MG/DL
BILIRUB SERPL-MCNC: 0.6 MG/DL
BUN SERPL-MCNC: 23 MG/DL
CALCIUM SERPL-MCNC: 9.8 MG/DL
CHLORIDE SERPL-SCNC: 103 MMOL/L
CO2 SERPL-SCNC: 25 MMOL/L
CREAT SERPL-MCNC: 1.4 MG/DL
EGFR: 53 ML/MIN/1.73M2
GLUCOSE SERPL-MCNC: 120 MG/DL
HCT VFR BLD CALC: 39.2 %
HGB BLD-MCNC: 13 G/DL
MCHC RBC-ENTMCNC: 29.2 PG
MCHC RBC-ENTMCNC: 33.2 G/DL
MCV RBC AUTO: 88.1 FL
PLATELET # BLD AUTO: 168 K/UL
PMV BLD: 10 FL
POTASSIUM SERPL-SCNC: 4.4 MMOL/L
PROT SERPL-MCNC: 7.2 G/DL
PSA SERPL-MCNC: 1.31 NG/ML
RBC # BLD: 4.45 M/UL
RBC # FLD: 11.9 %
SODIUM SERPL-SCNC: 140 MMOL/L
WBC # FLD AUTO: 4.3 K/UL

## 2022-05-04 ENCOUNTER — APPOINTMENT (OUTPATIENT)
Dept: HEMATOLOGY ONCOLOGY | Facility: CLINIC | Age: 72
End: 2022-05-04
Payer: MEDICARE

## 2022-05-04 ENCOUNTER — OUTPATIENT (OUTPATIENT)
Dept: OUTPATIENT SERVICES | Facility: HOSPITAL | Age: 72
LOS: 1 days | Discharge: HOME | End: 2022-05-04

## 2022-05-04 VITALS
DIASTOLIC BLOOD PRESSURE: 74 MMHG | TEMPERATURE: 97.9 F | BODY MASS INDEX: 35.25 KG/M2 | HEART RATE: 73 BPM | SYSTOLIC BLOOD PRESSURE: 150 MMHG | WEIGHT: 238 LBS | HEIGHT: 69 IN | RESPIRATION RATE: 18 BRPM

## 2022-05-04 DIAGNOSIS — H26.9 UNSPECIFIED CATARACT: Chronic | ICD-10-CM

## 2022-05-04 PROCEDURE — 99214 OFFICE O/P EST MOD 30 MIN: CPT

## 2022-05-04 NOTE — RESULTS/DATA
[FreeTextEntry1] : (9.17.2020) \par Surgical Final Report\par \par \par \par \par Final Diagnosis\par \par 1. Prostate, right posterior median apex, biopsy\par - Benign prostatic tissue\par \par 2. Prostate, right posterior median base, biopsy\par - Benign prostatic tissue\par \par 3. Prostate, right posterior lateral apex, biopsy\par - Benign prostatic tissue\par \par 4. Prostate, right posterior lateral base, biopsy\par - Benign prostatic tissue\par \par 5. Prostate, right lateral, biopsy\par - Benign prostatic tissue\par \par 6. Prostate, right anterior, biopsy\par -  Adenocarcinoma of the prostate, Prognostic Grade Group 2\par (David score 3+4=7) involving 50% (6 mm in length) of 1 of 1\par core(s). [place] pattern 4 involves 10% of the tumor.\par \par 7. Prostate, left posterior medial apex, biopsy\par - Benign prostatic tissue\par \par 8. Prostate, left posterior medial base, biopsy\par - Benign prostatic tissue\par \par 9. Prostate, left posterior lateral apex, biopsy\par - Benign prostatic tissue\par \par 10. Prostate, left posterior lateral base, biopsy\par - Fibromuscular tissue\par \par 11. Prostate, left lateral, biopsy\par - Benign prostatic tissue\par \par 12. Prostate, left anterior, biopsy\par - Benign prostatic tissue\par \par Dr. Corin Stevens was notified of the diagnosis on 09/23/20.\par \par \par \par \par \par \par \par CORIN STOUT                       3\par \par \par \par Surgical Final Report\par \par \par \par \par Case reported to Tumor Registry.\par \par Verified by: Rosa Adams M.D.\par (Electronic Signature)\par Reported on: 09/23/20 12:29 EDT, [address] [facility] [address],\[place] [address]\par Phone: [phone]   Fax: [phone]\par _________________________________________________________________\par \par Clinical Information\par R97.20\par PSA elevation, negative MRI\par Surgical procedure: Transperineal prostate biopsy\par \par Gross Description\par 1.  Specimen is received in formalin in container labeled as\par "right posterior medial apex" : Consists of a single core of\par white-tan tissue measuring 0.5 x 0.1 cm. All submitted in one\par cassette.\par \par 2.  Specimen is received in formalin in container labeled as\par "right posterior medial base": Consists of a single core of\par white-tan tissue measuring 0.6 x 0.1 cm. All submitted in one\par cassette.\par \par 3.  Specimen is received in formalin in container labeled as\par "right posterior lateral apex": Consists of a single core of\par white-tan tissue measuring 0.5 x 0.1 cm. All submitted in one\par cassette.\par \par 4.  Specimen is received in formalin in container labeled as\par "right posterior lateral base": Consists of a single core of\par white-tan tissue measuring 0.4 x 0.1 cm. All submitted in one\par cassette.\par \par 5. Specimen is received in formalin in container labeled as\par "right lateral": Consists of a single core of white-tan tissue\par measuring 0.7 x 0.1 cm. All submitted in one cassette.\par \par 6.  Specimen is received in formalin in container labeled as\par "right anterior": Consists of a single core of white-tan tissue\par measuring 1.4 x 0.1 cm. All submitted in one cassette.\par \par 7.  Specimen is received in formalin in container labeled as\par "left posterior medial apex" : Consists of a single core of\par white-tan tissue measuring 0.6 x 0.1 cm. All submitted in one\par cassette.\par \par \par \par \par \par \par CORIN STOUT                       3\par \par \par \par Surgical Final Report\par \par \par \par \par 8.  Specimen is received in formalin in container labeled as\par "left posterior medial base": Consists of a single core of white-\par tan tissue measuring 0.5 x 0.1 cm. All submitted in one cassette.\par \par 9.  Specimen is received in formalin in container labeled as\par "left posterior lateral apex": Consists of a single core of\par white-tan tissue measuring 0.5 x 0.1 cm. All submitted in one\par cassette.\par \par 10.  Specimen is received in formalin in container labeled as\par "left posterior lateral base": Consists of a single fragment of\par white-tan tissue measuring 0.2 x 0.1 cm. All submitted in one\par cassette.\par \par 11. Specimen is received in formalin in container labeled as\par "left lateral": Consists of a single core of white-tan tissue\par measuring 0.8 x 0.1 cm. All submitted in one cassette.\par \par 12.  Specimen is received in formalin in container labeled as\par "left anterior: Consists of a single core of white-tan tissue\par measuring 0.4 x 0.1 cm. All submitted in one cassette.\par \par All cores are inked with Eosin.

## 2022-05-04 NOTE — REVIEW OF SYSTEMS
[Recent Change In Weight] : ~T recent weight change [Abdominal Pain] : abdominal pain [Negative] : Allergic/Immunologic [Fever] : no fever [Chills] : no chills [Depression] : no depression [Easy Bleeding] : no tendency for easy bleeding [FreeTextEntry2] : 6lb weight loss over the last 3-6 months [FreeTextEntry7] : still reports occasional lower abd pain / around waist [FreeTextEntry8] : urinary frequency [FreeTextEntry9] : reports pain around waist line and around lowed abd [de-identified] : reports short term memory issues x 1 year [de-identified] : reports erectile dysfunction following RT

## 2022-05-04 NOTE — ASSESSMENT
[FreeTextEntry1] : # Adenocarcinoma of the prostate in 09/2020, Grade Group 2, (David score 3+4=7) ; s/p RT with Dr. Bowers in 12/2020, 14 DOSES.\par ECOG 1\par - PSA after RT came down to 2 and now trending down to 1.31 on 5/2/22\par - explained that decrease of PSA to zeros would be expected after the prostatectomy, not after RT. \par - MRI prostate 11/2021 AND bone scan 12/2021 do not show any sign of disease; I do not suspect recurrence \par - followup with URO, Dr. Reveles, next available regarding bothersome LUTs + ED\par - followup with [facility], Dr. Bowers, as indicated\par - PET/CT PSMA shows no metastatic disease\par - will c/w active surveillance \par *PSA recurrence is defined as 1) PSA increase by 2 ng/mL or more above the homer PSA is the standard definition for PSA recurrence after EBRT w/ or w/o HT. And 2) A recurrence evaluation should be considered when PSA has been confirmed to increasing after radiation even if the increase above homer is not yet 2 ng/mL.\par \par # Anemia, due to B12 deficiency; MMA previously high\par - c/w Vitamin B12 and folate supplementation , no refills needed\par \par RTC in 3 months (August) \par psa, cbc, bmp, lft, esr, crp prior \par seen/examined w/ [facility] fellow;note reviewed;\par PSMA reviewed and showed no evidence of malignancy\par PSA has been decreasing; pt was explained that we do not expect PSA to homer to zero after RT\par will repeat PSA in 3 months\par surveillance, no need for treatment at this time

## 2022-05-04 NOTE — CONSULT LETTER
[Dear  ___] : Dear  [unfilled], [Consult Letter:] : I had the pleasure of evaluating your patient, [unfilled]. [Please see my note below.] : Please see my note below. [Sincerely,] : Sincerely, [FreeTextEntry3] : Randy Pulido DO\par Attending Physician,\par Hematology/ Medical Oncology\par [phone] office\par \par

## 2022-05-04 NOTE — HISTORY OF PRESENT ILLNESS
[de-identified] : Mr. CORIN STOUT is a 71 year old male here today for evaluation and management of Prostate Cancer.  \par \par He was referred by URO, Dr. Reveles.  CORIN is a 71 year old M of [place] descent with PMHx including CKD, dyslipidemia, HTN, LPRD, LBBB and prostate cancer, who presents to clinic to establish care.   His PSA was 2.4 in 2/18. It then went to 4.6 in 12/19.  Given that he was off and on with Avodart, the true behavior is more opaque.  An MRI done in Feb 2020 only noted a low risk (PI-RADS 2) lesion.  However the PSA continued to rise and was 7.15 on 8/2/2020. by August the 4K was quite elevated at 63%. He is s/p core biopsy on 9/23/2020 and pathology shows adenocarcinoma of the prostate, right anterior with a David score 3 + 4 = 7 involving 50 % (6 mm in length) 1 of 12 cores positive.  At that time, patient and son made decision he would rather avoid surgery and want to discuss radiotherapy.  Patient completed RT with Dr. Bowers in 12/2020.  PSA is slowly rising, now up to 2.05ng/mL from 09/2021.  Patient denies fever, chills, nausea, vomiting, dyspnea, unintentional weight loss or hematuria.  Patient transiently developed urinary hesitancy following RT which resolved.  He still reports urinary frequency now.  Patient reports no pertinent family history of malignancy or blood/clotting disorder. \par \par RADIOLOGIC WORKUP\par bone scan (12.12.2021) IMPRESSION: No definite evidence to suggest metastatic bone disease.\par MR Prostate (11.22.2021) IMPRESSION:1. No MRI suspicious prostate lesions; increased diffuse homogeneous low T2 signal within the peripheral zone, without corresponding diffusion abnormality, likely reflecting sequela of radiation fibrosis/atrophy. 2. Since November 23, 2020, no significant change in hemorrhagic and atrophic right-sided seminal vesicles.\par MR Prostate (11.23.2020) IMPRESSION:1.  Known clinically significant prostate cancer is not readily evident by MRI. No gross evidence of extraprostatic tumor on this noncontrast study. Moderate BPH.2.  No pelvic lymphadenopathy. 3.  Interval signal change of the right seminal vesicle, may represent postbiopsy or postinflammatory change.\par MR Prostate (3.24.2020) IMPRESSION:PI-RADSv2 Category 2 - Low (clinically significant cancer is unlikely to be present).  No dominant lesion. Moderate BPH.\par \par LAB WORKUP\par (9.10.2021) PSA 2.05\par (4.3.2021) PSA 1.3\par (1.25.2021) PSA 2.67\par (8.2.2020)  PSA 7.15 ng/mL\par (7.16.2020) PSA 6.1 ng/mL\par (3.3.2020) WBC 5.2, Hgb 13.7, Hct 41.2, MCV 89.2, \par (1.30.2020)  PSA 5.2 ng/mL\par (19.20.2019) WBC 5.35, Hgb 13.3, Hct 41.2, MCV 91.2, , Hep A (+)\par (11.21.2018) WBC 4.81, Hgb 12.9, Hct 40.9, MCV 89.7, \par (1.12.2017) WBC 6.45, Hgb 12.1, Hct 38.6, MCV 91.7, \par (01.25.2021) PSA was 2 \par (04.2021) PSA was 1.4 \par (09.2021) PSA was 2.04\par (02.14.2022) PSA was 1.45\par \par PATHOLOGY\par (see results section)\par \par HCM\par Colonoscopy done around 2018 with Dr. Flynn, due in April 2022\par Upper Endoscopy done around 2018 with Dr. Flynn , pt not aware of results\par COVID Vaccinated (x3) [de-identified] : 2/15/22\par Patient is here for a follow-up visit for Prostate Cancer.  Reviewed most recent PSA which is stable at 1.45ng/mL.  Patient denies fever, chills, nausea, vomiting, dyspnea or bleeding.  He reports lower pelvic/bladder discomfort intermittently over the last 2 weeks or so.  He notes urinary frequency (up to 7x per day) and nocturia over the last few months.  He follows with URO, Dr. Reveles and has revisit pending for next week.  He does not currently receive ADT injections.  \par \par 5/3/22 \par Pt is here for follow up. Pt underwent PET/CT PSMA scan which shows no evidenc of metastatic disease. Otherwise, pt feels well and denies any other complaints. 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 Further \par evaluation may be obtained with contrast-enhanced CT chest.

## 2022-05-09 DIAGNOSIS — C61 MALIGNANT NEOPLASM OF PROSTATE: ICD-10-CM

## 2022-06-07 ENCOUNTER — APPOINTMENT (OUTPATIENT)
Age: 72
End: 2022-06-07

## 2022-08-01 ENCOUNTER — RESULT CHARGE (OUTPATIENT)
Age: 72
End: 2022-08-01

## 2022-08-08 ENCOUNTER — APPOINTMENT (OUTPATIENT)
Dept: HEMATOLOGY ONCOLOGY | Facility: CLINIC | Age: 72
End: 2022-08-08

## 2022-08-10 ENCOUNTER — APPOINTMENT (OUTPATIENT)
Dept: HEMATOLOGY ONCOLOGY | Facility: CLINIC | Age: 72
End: 2022-08-10

## 2022-08-22 ENCOUNTER — APPOINTMENT (OUTPATIENT)
Dept: HEMATOLOGY ONCOLOGY | Facility: CLINIC | Age: 72
End: 2022-08-22

## 2022-08-22 ENCOUNTER — LABORATORY RESULT (OUTPATIENT)
Age: 72
End: 2022-08-22

## 2022-08-22 LAB
HCT VFR BLD CALC: 40.3 %
HGB BLD-MCNC: 13.2 G/DL
MCHC RBC-ENTMCNC: 28.6 PG
MCHC RBC-ENTMCNC: 32.8 G/DL
MCV RBC AUTO: 87.4 FL
PLATELET # BLD AUTO: 150 K/UL
PMV BLD: 9.7 FL
RBC # BLD: 4.61 M/UL
RBC # FLD: 11.5 %
WBC # FLD AUTO: 3.52 K/UL

## 2022-08-23 ENCOUNTER — APPOINTMENT (OUTPATIENT)
Dept: HEMATOLOGY ONCOLOGY | Facility: CLINIC | Age: 72
End: 2022-08-23

## 2022-08-23 ENCOUNTER — OUTPATIENT (OUTPATIENT)
Dept: OUTPATIENT SERVICES | Facility: HOSPITAL | Age: 72
LOS: 1 days | Discharge: HOME | End: 2022-08-23

## 2022-08-23 VITALS
TEMPERATURE: 98 F | OXYGEN SATURATION: 99 % | DIASTOLIC BLOOD PRESSURE: 69 MMHG | HEART RATE: 86 BPM | SYSTOLIC BLOOD PRESSURE: 145 MMHG | BODY MASS INDEX: 35.25 KG/M2 | HEIGHT: 69 IN | WEIGHT: 238 LBS | RESPIRATION RATE: 19 BRPM

## 2022-08-23 DIAGNOSIS — E53.8 DEFICIENCY OF OTHER SPECIFIED B GROUP VITAMINS: ICD-10-CM

## 2022-08-23 DIAGNOSIS — H26.9 UNSPECIFIED CATARACT: Chronic | ICD-10-CM

## 2022-08-23 DIAGNOSIS — D64.9 ANEMIA, UNSPECIFIED: ICD-10-CM

## 2022-08-23 LAB
ALBUMIN SERPL ELPH-MCNC: 4.5 G/DL
ALP BLD-CCNC: 57 U/L
ALT SERPL-CCNC: 14 U/L
ANION GAP SERPL CALC-SCNC: 11 MMOL/L
AST SERPL-CCNC: 18 U/L
BILIRUB DIRECT SERPL-MCNC: <0.2 MG/DL
BILIRUB INDIRECT SERPL-MCNC: >0.6 MG/DL
BILIRUB SERPL-MCNC: 0.8 MG/DL
BUN SERPL-MCNC: 15 MG/DL
CALCIUM SERPL-MCNC: 9.6 MG/DL
CHLORIDE SERPL-SCNC: 104 MMOL/L
CO2 SERPL-SCNC: 25 MMOL/L
CREAT SERPL-MCNC: 1.5 MG/DL
CRP SERPL-MCNC: 3 MG/L
EGFR: 49 ML/MIN/1.73M2
GLUCOSE SERPL-MCNC: 119 MG/DL
POTASSIUM SERPL-SCNC: 5.1 MMOL/L
PROT SERPL-MCNC: 7.1 G/DL
PSA SERPL-MCNC: 0.66 NG/ML
SODIUM SERPL-SCNC: 140 MMOL/L

## 2022-08-23 PROCEDURE — 99214 OFFICE O/P EST MOD 30 MIN: CPT

## 2022-08-23 NOTE — HISTORY OF PRESENT ILLNESS
[de-identified] : Mr. CORIN STOUT is a 71 year old male here today for evaluation and management of Prostate Cancer.  \par \par He was referred by URO, Dr. Reveles.  CORIN is a 71 year old M of [place] descent with PMHx including CKD, dyslipidemia, HTN, LPRD, LBBB and prostate cancer, who presents to clinic to establish care.   His PSA was 2.4 in 2/18. It then went to 4.6 in 12/19.  Given that he was off and on with Avodart, the true behavior is more opaque.  An MRI done in Feb 2020 only noted a low risk (PI-RADS 2) lesion.  However the PSA continued to rise and was 7.15 on 8/2/2020. by August the 4K was quite elevated at 63%. He is s/p core biopsy on 9/23/2020 and pathology shows adenocarcinoma of the prostate, right anterior with a David score 3 + 4 = 7 involving 50 % (6 mm in length) 1 of 12 cores positive.  At that time, patient and son made decision he would rather avoid surgery and want to discuss radiotherapy.  Patient completed RT with Dr. Bowers in 12/2020.  PSA is slowly rising, now up to 2.05ng/mL from 09/2021.  Patient denies fever, chills, nausea, vomiting, dyspnea, unintentional weight loss or hematuria.  Patient transiently developed urinary hesitancy following RT which resolved.  He still reports urinary frequency now.  Patient reports no pertinent family history of malignancy or blood/clotting disorder. \par \par RADIOLOGIC WORKUP\par bone scan (12.12.2021) IMPRESSION: No definite evidence to suggest metastatic bone disease.\par MR Prostate (11.22.2021) IMPRESSION:1. No MRI suspicious prostate lesions; increased diffuse homogeneous low T2 signal within the peripheral zone, without corresponding diffusion abnormality, likely reflecting sequela of radiation fibrosis/atrophy. 2. Since November 23, 2020, no significant change in hemorrhagic and atrophic right-sided seminal vesicles.\par MR Prostate (11.23.2020) IMPRESSION:1.  Known clinically significant prostate cancer is not readily evident by MRI. No gross evidence of extraprostatic tumor on this noncontrast study. Moderate BPH.2.  No pelvic lymphadenopathy. 3.  Interval signal change of the right seminal vesicle, may represent postbiopsy or postinflammatory change.\par MR Prostate (3.24.2020) IMPRESSION:PI-RADSv2 Category 2 - Low (clinically significant cancer is unlikely to be present).  No dominant lesion. Moderate BPH.\par \par LAB WORKUP\par (9.10.2021) PSA 2.05\par (4.3.2021) PSA 1.3\par (1.25.2021) PSA 2.67\par (8.2.2020)  PSA 7.15 ng/mL\par (7.16.2020) PSA 6.1 ng/mL\par (3.3.2020) WBC 5.2, Hgb 13.7, Hct 41.2, MCV 89.2, \par (1.30.2020)  PSA 5.2 ng/mL\par (19.20.2019) WBC 5.35, Hgb 13.3, Hct 41.2, MCV 91.2, , Hep A (+)\par (11.21.2018) WBC 4.81, Hgb 12.9, Hct 40.9, MCV 89.7, \par (1.12.2017) WBC 6.45, Hgb 12.1, Hct 38.6, MCV 91.7, \par (01.25.2021) PSA was 2 \par (04.2021) PSA was 1.4 \par (09.2021) PSA was 2.04\par (02.14.2022) PSA was 1.45\par \par PATHOLOGY\par (see results section)\par \par HCM\par Colonoscopy done around 2018 with Dr. Flynn, due in April 2022\par Upper Endoscopy done around 2018 with Dr. Flynn , pt not aware of results\par COVID Vaccinated (x3) [de-identified] : 2/15/22\par Patient is here for a follow-up visit for Prostate Cancer.  Reviewed most recent PSA which is stable at 1.45ng/mL.  Patient denies fever, chills, nausea, vomiting, dyspnea or bleeding.  He reports lower pelvic/bladder discomfort intermittently over the last 2 weeks or so.  He notes urinary frequency (up to 7x per day) and nocturia over the last few months.  He follows with Dr. Abdirizak ROMERO and has revisit pending for next week.  He does not currently receive ADT injections.  \par \par 5/3/22 \par Pt is here for follow up. Pt underwent PET/CT PSMA scan which shows no evidenc of metastatic disease. Otherwise, pt feels well and denies any other complaints. 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 Further \par evaluation may be obtained with contrast-enhanced CT chest.\par \par 8/23/22\par Patient is here for a follow-up visit for Prostate Cancer.  Reviewed most recent PSA which is stable at 0.66ng/mL.  Patient denies fever, chills, nausea, vomiting, dyspnea or bleeding.  He follows with Dr. Abdirizak ROMERO ; not currently receive ADT injections.  Weight is stable.

## 2022-08-23 NOTE — CONSULT LETTER
[Dear  ___] : Dear  [unfilled], [Consult Letter:] : I had the pleasure of evaluating your patient, [unfilled]. [Please see my note below.] : Please see my note below. [Sincerely,] : Sincerely, [FreeTextEntry3] : Randy Pulido DO\par Attending Physician,\par Hematology/ Medical Oncology\par [phone] office\par \par

## 2022-08-23 NOTE — RESULTS/DATA
[FreeTextEntry1] : (9.17.2020) \par Surgical Final Report\par \par \par \par \par Final Diagnosis\par \par 1. Prostate, right posterior median apex, biopsy\par - Benign prostatic tissue\par \par 2. Prostate, right posterior median base, biopsy\par - Benign prostatic tissue\par \par 3. Prostate, right posterior lateral apex, biopsy\par - Benign prostatic tissue\par \par 4. Prostate, right posterior lateral base, biopsy\par - Benign prostatic tissue\par \par 5. Prostate, right lateral, biopsy\par - Benign prostatic tissue\par \par 6. Prostate, right anterior, biopsy\par -  Adenocarcinoma of the prostate, Prognostic Grade Group 2\par (David score 3+4=7) involving 50% (6 mm in length) of 1 of 1\par core(s). Smoot pattern 4 involves 10% of the tumor.\par \par 7. Prostate, left posterior medial apex, biopsy\par - Benign prostatic tissue\par \par 8. Prostate, left posterior medial base, biopsy\par - Benign prostatic tissue\par \par 9. Prostate, left posterior lateral apex, biopsy\par - Benign prostatic tissue\par \par 10. Prostate, left posterior lateral base, biopsy\par - Fibromuscular tissue\par \par 11. Prostate, left lateral, biopsy\par - Benign prostatic tissue\par \par 12. Prostate, left anterior, biopsy\par - Benign prostatic tissue\par \par Dr. Corin Stevens was notified of the diagnosis on 09/23/20.\par \par \par \par \par \par \par \par CORIN STOUT                       3\par \par \par \par Surgical Final Report\par \par \par \par \par Case reported to Tumor Registry.\par \par Verified by: Rosa Adams M.D.\par (Electronic Signature)\par Reported on: 09/23/20 12:29 EDT, [address] [facility] [address] [address],\[place] [address]\par Phone: [phone]   Fax: [phone]\par _________________________________________________________________\par \par Clinical Information\par R97.20\par PSA elevation, negative MRI\par Surgical procedure: Transperineal prostate biopsy\par \par Gross Description\par 1.  Specimen is received in formalin in container labeled as\par "right posterior medial apex" : Consists of a single core of\par white-tan tissue measuring 0.5 x 0.1 cm. All submitted in one\par cassette.\par \par 2.  Specimen is received in formalin in container labeled as\par "right posterior medial base": Consists of a single core of\par white-tan tissue measuring 0.6 x 0.1 cm. All submitted in one\par cassette.\par \par 3.  Specimen is received in formalin in container labeled as\par "right posterior lateral apex": Consists of a single core of\par white-tan tissue measuring 0.5 x 0.1 cm. All submitted in one\par cassette.\par \par 4.  Specimen is received in formalin in container labeled as\par "right posterior lateral base": Consists of a single core of\par white-tan tissue measuring 0.4 x 0.1 cm. All submitted in one\par cassette.\par \par 5. Specimen is received in formalin in container labeled as\par "right lateral": Consists of a single core of white-tan tissue\par measuring 0.7 x 0.1 cm. All submitted in one cassette.\par \par 6.  Specimen is received in formalin in container labeled as\par "right anterior": Consists of a single core of white-tan tissue\par measuring 1.4 x 0.1 cm. All submitted in one cassette.\par \par 7.  Specimen is received in formalin in container labeled as\par "left posterior medial apex" : Consists of a single core of\par white-tan tissue measuring 0.6 x 0.1 cm. All submitted in one\par cassette.\par \par \par \par \par \par \par CORIN STOUT                       3\par \par \par \par Surgical Final Report\par \par \par \par \par 8.  Specimen is received in formalin in container labeled as\par "left posterior medial base": Consists of a single core of white-\par tan tissue measuring 0.5 x 0.1 cm. All submitted in one cassette.\par \par 9.  Specimen is received in formalin in container labeled as\par "left posterior lateral apex": Consists of a single core of\par white-tan tissue measuring 0.5 x 0.1 cm. All submitted in one\par cassette.\par \par 10.  Specimen is received in formalin in container labeled as\par "left posterior lateral base": Consists of a single fragment of\par white-tan tissue measuring 0.2 x 0.1 cm. All submitted in one\par cassette.\par \par 11. Specimen is received in formalin in container labeled as\par "left lateral": Consists of a single core of white-tan tissue\par measuring 0.8 x 0.1 cm. All submitted in one cassette.\par \par 12.  Specimen is received in formalin in container labeled as\par "left anterior: Consists of a single core of white-tan tissue\par measuring 0.4 x 0.1 cm. All submitted in one cassette.\par \par All cores are inked with Eosin.

## 2022-08-23 NOTE — REVIEW OF SYSTEMS
[Abdominal Pain] : abdominal pain [Negative] : Constitutional [Fever] : no fever [Chills] : no chills [Depression] : no depression [Easy Bleeding] : no tendency for easy bleeding [FreeTextEntry7] : still reports occasional lower abd pain / around waist [FreeTextEntry8] : urinary frequency [FreeTextEntry9] : reports pain around waist line and around lowed abd [de-identified] : reports short term memory issues x 1 year [de-identified] : reports erectile dysfunction following RT

## 2022-08-23 NOTE — ASSESSMENT
[FreeTextEntry1] : # Adenocarcinoma of the prostate in 09/2020, Grade Group 2, (David score 3+4=7) ; s/p RT with Dr. Bowers in 12/2020, 14 DOSES.\par ECOG 1\par - PSA after RT came down to 2 and now trending down to 1.31 on 5/2/22\par - explained that decrease of PSA to zeros would be expected after the prostatectomy, not after RT. \par - MRI prostate 11/2021 AND bone scan 12/2021 do not show any sign of disease; I do not suspect recurrence \par - followup with URO, Dr. Reveles, next available regarding bothersome LUTs + ED\par - followup with [facility], Dr. Bowers, as indicated \par - PET/CT PSMA shows no metastatic disease \par - will c/w active surveillance ; PSA 0.66ng/mL from 08/2022 \par \par *PSA recurrence is defined as 1) PSA increase by 2 ng/mL or more above the homer PSA is the standard definition for PSA recurrence after EBRT w/ or w/o HT. And 2) A recurrence evaluation should be considered when PSA has been confirmed to increasing after radiation even if the increase above homer is not yet 2 ng/mL.\par \par # Anemia, due to B12 deficiency; MMA previously high\par - c/w Vitamin B12 and folate supplementation , no refills needed\par \par RTC in 3 months with CBC, BMP, LFTs, PSA level 2 days prior \par seen/examined w/ NP [facility];note reviewed;case discussed\par PSA is stable\par f/u  re: BPH

## 2022-08-24 DIAGNOSIS — E53.8 DEFICIENCY OF OTHER SPECIFIED B GROUP VITAMINS: ICD-10-CM

## 2022-08-24 DIAGNOSIS — C61 MALIGNANT NEOPLASM OF PROSTATE: ICD-10-CM

## 2022-08-24 DIAGNOSIS — D64.9 ANEMIA, UNSPECIFIED: ICD-10-CM

## 2022-09-26 RX ORDER — FOLIC ACID 1 MG/1
1 TABLET ORAL DAILY
Qty: 90 | Refills: 1 | Status: ACTIVE | COMMUNITY
Start: 2021-12-13 | End: 1900-01-01

## 2022-10-17 ENCOUNTER — RESULT CHARGE (OUTPATIENT)
Age: 72
End: 2022-10-17

## 2022-10-17 ENCOUNTER — APPOINTMENT (OUTPATIENT)
Dept: CARDIOLOGY | Facility: CLINIC | Age: 72
End: 2022-10-17

## 2022-11-28 ENCOUNTER — APPOINTMENT (OUTPATIENT)
Dept: CARDIOLOGY | Facility: CLINIC | Age: 72
End: 2022-11-28

## 2022-11-28 ENCOUNTER — RESULT CHARGE (OUTPATIENT)
Age: 72
End: 2022-11-28

## 2022-11-28 VITALS
BODY MASS INDEX: 34.51 KG/M2 | HEIGHT: 69 IN | WEIGHT: 233 LBS | DIASTOLIC BLOOD PRESSURE: 85 MMHG | SYSTOLIC BLOOD PRESSURE: 155 MMHG | HEART RATE: 70 BPM

## 2022-11-28 PROCEDURE — 93000 ELECTROCARDIOGRAM COMPLETE: CPT

## 2022-11-28 PROCEDURE — 99214 OFFICE O/P EST MOD 30 MIN: CPT

## 2022-11-28 NOTE — ASSESSMENT
[FreeTextEntry1] : \par PVC's - isolated\par BP - C/w labetalol, change amlodipine to nifedipine ER. Repeat BP check\par DL - on Crestor. check labs with PMD\par Prostate CA - f/u with urology\par Negative stress echo 05/2019. \par Cath report - non-obstructive 2015\par Repeat echo to assess LV.\par c/w primary prevention.\par weight loss discussed.\par Return in 3 months

## 2022-11-28 NOTE — HISTORY OF PRESENT ILLNESS
[FreeTextEntry1] : 71 y/o male with history of HTN, DL, non-obstructive CAD (cath in 2015 - mild plaque). Presents for f/u. No chest pain. No syncope. Gained weight. BP is elevated today - usually controlled. Had a stress echo 5/14/19, which was negative for ischemia. Old LBBB on ECG. 2d echo noted - normal LV function. Occasional HARDEN.

## 2022-12-05 ENCOUNTER — APPOINTMENT (OUTPATIENT)
Dept: HEMATOLOGY ONCOLOGY | Facility: CLINIC | Age: 72
End: 2022-12-05

## 2022-12-05 LAB
BASOPHILS # BLD AUTO: 0.02 K/UL
BASOPHILS NFR BLD AUTO: 0.5 %
EOSINOPHIL # BLD AUTO: 0.1 K/UL
EOSINOPHIL NFR BLD AUTO: 2.5 %
HCT VFR BLD CALC: 39.2 %
HGB BLD-MCNC: 12.6 G/DL
IMM GRANULOCYTES NFR BLD AUTO: 0.3 %
LYMPHOCYTES # BLD AUTO: 1.57 K/UL
LYMPHOCYTES NFR BLD AUTO: 39.7 %
MAN DIFF?: NORMAL
MCHC RBC-ENTMCNC: 28.8 PG
MCHC RBC-ENTMCNC: 32.1 G/DL
MCV RBC AUTO: 89.7 FL
MONOCYTES # BLD AUTO: 0.49 K/UL
MONOCYTES NFR BLD AUTO: 12.4 %
NEUTROPHILS # BLD AUTO: 1.76 K/UL
NEUTROPHILS NFR BLD AUTO: 44.6 %
PLATELET # BLD AUTO: 170 K/UL
RBC # BLD: 4.37 M/UL
RBC # FLD: 11.8 %
WBC # FLD AUTO: 3.95 K/UL

## 2022-12-06 ENCOUNTER — APPOINTMENT (OUTPATIENT)
Dept: OTOLARYNGOLOGY | Facility: CLINIC | Age: 72
End: 2022-12-06

## 2022-12-06 LAB
ALBUMIN SERPL ELPH-MCNC: 5 G/DL
ALP BLD-CCNC: 51 U/L
ALT SERPL-CCNC: 14 U/L
ANION GAP SERPL CALC-SCNC: 9 MMOL/L
AST SERPL-CCNC: 20 U/L
BILIRUB DIRECT SERPL-MCNC: 0.2 MG/DL
BILIRUB INDIRECT SERPL-MCNC: 0.8 MG/DL
BILIRUB SERPL-MCNC: 1 MG/DL
BUN SERPL-MCNC: 17 MG/DL
CALCIUM SERPL-MCNC: 9.6 MG/DL
CHLORIDE SERPL-SCNC: 104 MMOL/L
CO2 SERPL-SCNC: 27 MMOL/L
CREAT SERPL-MCNC: 1.2 MG/DL
EGFR: 63 ML/MIN/1.73M2
GLUCOSE SERPL-MCNC: 113 MG/DL
POTASSIUM SERPL-SCNC: 4.5 MMOL/L
PROT SERPL-MCNC: 7.2 G/DL
PSA SERPL-MCNC: 0.95 NG/ML
SODIUM SERPL-SCNC: 140 MMOL/L

## 2022-12-21 ENCOUNTER — APPOINTMENT (OUTPATIENT)
Dept: CARDIOLOGY | Facility: CLINIC | Age: 72
End: 2022-12-21
Payer: MEDICARE

## 2022-12-21 DIAGNOSIS — I10 ESSENTIAL (PRIMARY) HYPERTENSION: ICD-10-CM

## 2022-12-21 PROCEDURE — 93306 TTE W/DOPPLER COMPLETE: CPT

## 2022-12-28 ENCOUNTER — APPOINTMENT (OUTPATIENT)
Dept: HEMATOLOGY ONCOLOGY | Facility: CLINIC | Age: 72
End: 2022-12-28
Payer: MEDICARE

## 2022-12-28 ENCOUNTER — OUTPATIENT (OUTPATIENT)
Dept: OUTPATIENT SERVICES | Facility: HOSPITAL | Age: 72
LOS: 1 days | End: 2022-12-28

## 2022-12-28 VITALS
RESPIRATION RATE: 18 BRPM | OXYGEN SATURATION: 97 % | WEIGHT: 224 LBS | HEART RATE: 72 BPM | DIASTOLIC BLOOD PRESSURE: 89 MMHG | BODY MASS INDEX: 33.18 KG/M2 | TEMPERATURE: 98.2 F | SYSTOLIC BLOOD PRESSURE: 151 MMHG | HEIGHT: 69 IN

## 2022-12-28 DIAGNOSIS — E53.8 DEFICIENCY OF OTHER SPECIFIED B GROUP VITAMINS: ICD-10-CM

## 2022-12-28 DIAGNOSIS — D64.9 ANEMIA, UNSPECIFIED: ICD-10-CM

## 2022-12-28 DIAGNOSIS — H26.9 UNSPECIFIED CATARACT: Chronic | ICD-10-CM

## 2022-12-28 DIAGNOSIS — C61 MALIGNANT NEOPLASM OF PROSTATE: ICD-10-CM

## 2022-12-28 PROCEDURE — 99214 OFFICE O/P EST MOD 30 MIN: CPT

## 2022-12-28 NOTE — CONSULT LETTER
[Dear  ___] : Dear  [unfilled], [Consult Letter:] : I had the pleasure of evaluating your patient, [unfilled]. [Please see my note below.] : Please see my note below. [Sincerely,] : Sincerely, [FreeTextEntry3] : Randy Pulido DO\par Attending Physician,\par Hematology/ Medical Oncology\par [phone] office\par \par

## 2022-12-28 NOTE — REVIEW OF SYSTEMS
[Abdominal Pain] : abdominal pain [Negative] : Allergic/Immunologic [Fever] : no fever [Chills] : no chills [Depression] : no depression [Easy Bleeding] : no tendency for easy bleeding [FreeTextEntry7] : still reports occasional lower abd pain / around waist [FreeTextEntry8] : urinary frequency [FreeTextEntry9] : reports pain around waist line and around lowed abd [de-identified] : reports short term memory issues x 1 year [de-identified] : reports erectile dysfunction following RT

## 2022-12-28 NOTE — HISTORY OF PRESENT ILLNESS
[de-identified] : Mr. CORIN STOUT is a 71 year old male here today for evaluation and management of Prostate Cancer.  \par \par He was referred by URO, Dr. Reveles.  CORIN is a 71 year old M of [place] descent with PMHx including CKD, dyslipidemia, HTN, LPRD, LBBB and prostate cancer, who presents to clinic to establish care.   His PSA was 2.4 in 2/18. It then went to 4.6 in 12/19.  Given that he was off and on with Avodart, the true behavior is more opaque.  An MRI done in Feb 2020 only noted a low risk (PI-RADS 2) lesion.  However the PSA continued to rise and was 7.15 on 8/2/2020. by August the 4K was quite elevated at 63%. He is s/p core biopsy on 9/23/2020 and pathology shows adenocarcinoma of the prostate, right anterior with a David score 3 + 4 = 7 involving 50 % (6 mm in length) 1 of 12 cores positive.  At that time, patient and son made decision he would rather avoid surgery and want to discuss radiotherapy.  Patient completed RT with Dr. Bowers in 12/2020.  PSA is slowly rising, now up to 2.05ng/mL from 09/2021.  Patient denies fever, chills, nausea, vomiting, dyspnea, unintentional weight loss or hematuria.  Patient transiently developed urinary hesitancy following RT which resolved.  He still reports urinary frequency now.  Patient reports no pertinent family history of malignancy or blood/clotting disorder. \par \par RADIOLOGIC WORKUP\par bone scan (12.12.2021) IMPRESSION: No definite evidence to suggest metastatic bone disease.\par MR Prostate (11.22.2021) IMPRESSION:1. No MRI suspicious prostate lesions; increased diffuse homogeneous low T2 signal within the peripheral zone, without corresponding diffusion abnormality, likely reflecting sequela of radiation fibrosis/atrophy. 2. Since November 23, 2020, no significant change in hemorrhagic and atrophic right-sided seminal vesicles.\par MR Prostate (11.23.2020) IMPRESSION:1.  Known clinically significant prostate cancer is not readily evident by MRI. No gross evidence of extraprostatic tumor on this noncontrast study. Moderate BPH.2.  No pelvic lymphadenopathy. 3.  Interval signal change of the right seminal vesicle, may represent postbiopsy or postinflammatory change.\par MR Prostate (3.24.2020) IMPRESSION:PI-RADSv2 Category 2 - Low (clinically significant cancer is unlikely to be present).  No dominant lesion. Moderate BPH.\par \par LAB WORKUP\par (9.10.2021) PSA 2.05\par (4.3.2021) PSA 1.3\par (1.25.2021) PSA 2.67\par (8.2.2020)  PSA 7.15 ng/mL\par (7.16.2020) PSA 6.1 ng/mL\par (3.3.2020) WBC 5.2, Hgb 13.7, Hct 41.2, MCV 89.2, \par (1.30.2020)  PSA 5.2 ng/mL\par (19.20.2019) WBC 5.35, Hgb 13.3, Hct 41.2, MCV 91.2, , Hep A (+)\par (11.21.2018) WBC 4.81, Hgb 12.9, Hct 40.9, MCV 89.7, \par (1.12.2017) WBC 6.45, Hgb 12.1, Hct 38.6, MCV 91.7, \par (01.25.2021) PSA was 2 \par (04.2021) PSA was 1.4 \par (09.2021) PSA was 2.04\par (02.14.2022) PSA was 1.45\par \par PATHOLOGY\par (see results section)\par \par HCM\par Colonoscopy done around 2018 with Dr. Flynn, due in April 2022\par Upper Endoscopy done around 2018 with Dr. Flynn , pt not aware of results\par COVID Vaccinated (x3) [de-identified] : 2/15/22\par Patient is here for a follow-up visit for Prostate Cancer.  Reviewed most recent PSA which is stable at 1.45ng/mL.  Patient denies fever, chills, nausea, vomiting, dyspnea or bleeding.  He reports lower pelvic/bladder discomfort intermittently over the last 2 weeks or so.  He notes urinary frequency (up to 7x per day) and nocturia over the last few months.  He follows with Dr. Abdirizak ROMERO and has revisit pending for next week.  He does not currently receive ADT injections.  \par \par 5/3/22 \par Pt is here for follow up. Pt underwent PET/CT PSMA scan which shows no evidenc of metastatic disease. Otherwise, pt feels well and denies any other complaints. 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 Further \par evaluation may be obtained with contrast-enhanced CT chest.\par \par 8/23/22\par Patient is here for a follow-up visit for Prostate Cancer.  Reviewed most recent PSA which is stable at 0.66ng/mL.  Patient denies fever, chills, nausea, vomiting, dyspnea or bleeding.  He follows with Dr. Abdirizak ROMERO ; not currently receive ADT injections.  Weight is stable.  \par \par 12/28/22\par Patient is here for a follow-up visit for Prostate Cancer.  He reports intermittent lower abdominal pain, frequency. Reviewed most recent PSA which is stable at 0.9ng/mL.  Patient denies fever, chills, nausea, vomiting, dyspnea or bleeding.  He follows with Dr. Abdirizak ROMERO ; not currently receive ADT injections.  Weight is stable.

## 2022-12-28 NOTE — ASSESSMENT
[FreeTextEntry1] : # Adenocarcinoma of the prostate in 09/2020, Grade Group 2, (David score 3+4=7) ; s/p RT with Dr. Bowers in 12/2020, 14 DOSES.\par ECOG 1\par - PSA after RT came down to 2 and now trending down to 1.31 on 5/2/22\par - explained that decrease of PSA to zeros would be expected after the prostatectomy, not after RT. \par - MRI prostate 11/2021 AND bone scan 12/2021 do not show any sign of disease; I do not suspect recurrence \par - followup with URO, Dr. Reveles, next available regarding bothersome LUTs + ED\par - followup with [facility],  as indicated \par - PET/CT PSMA shows no metastatic disease \par - will c/w active surveillance ; PSA 0.9ng/mL from 12/2022 \par \par *PSA recurrence is defined as 1) PSA increase by 2 ng/mL or more above the homer PSA is the standard definition for PSA recurrence after EBRT w/ or w/o HT. And 2) A recurrence evaluation should be considered when PSA has been confirmed to increasing after radiation even if the increase above homer is not yet 2 ng/mL.\par \par # Anemia, due to B12 deficiency; MMA previously high\par - c/w Vitamin B12 and folate supplementation , refills sent\par \par seen/examined w/ hemonc fellow; note reviewed; case discussed\par 71 yo man with prostate adenocarcinoma, David 3+4, grade group 2, treated with radiation therapy at [facility] completed 12/2020. No adjuvant hormonal therapy was administered. He was referred to me due to PSA >2; pt was explained that PSA is not expected to reach ZERO after RT and it is possible to have variations. He still wanted to get PSMA, which was done 03/2022 and failed to reveal metastatic disease; in fact, his PSA is 0.9. Explaiend that there is no convincing reason to follow with medical oncology at this time. Instead, he would be better followed by either [facility] team who administered therapy or urology team or both. \par

## 2022-12-28 NOTE — RESULTS/DATA
[FreeTextEntry1] : (9.17.2020) \par Surgical Final Report\par \par \par \par \par Final Diagnosis\par \par 1. Prostate, right posterior median apex, biopsy\par - Benign prostatic tissue\par \par 2. Prostate, right posterior median base, biopsy\par - Benign prostatic tissue\par \par 3. Prostate, right posterior lateral apex, biopsy\par - Benign prostatic tissue\par \par 4. Prostate, right posterior lateral base, biopsy\par - Benign prostatic tissue\par \par 5. Prostate, right lateral, biopsy\par - Benign prostatic tissue\par \par 6. Prostate, right anterior, biopsy\par -  Adenocarcinoma of the prostate, Prognostic Grade Group 2\par (David score 3+4=7) involving 50% (6 mm in length) of 1 of 1\par core(s). [place] pattern 4 involves 10% of the tumor.\par \par 7. Prostate, left posterior medial apex, biopsy\par - Benign prostatic tissue\par \par 8. Prostate, left posterior medial base, biopsy\par - Benign prostatic tissue\par \par 9. Prostate, left posterior lateral apex, biopsy\par - Benign prostatic tissue\par \par 10. Prostate, left posterior lateral base, biopsy\par - Fibromuscular tissue\par \par 11. Prostate, left lateral, biopsy\par - Benign prostatic tissue\par \par 12. Prostate, left anterior, biopsy\par - Benign prostatic tissue\par \par Dr. Corin Stevens was notified of the diagnosis on 09/23/20.\par \par \par \par \par \par \par \par CORIN STOUT                       3\par \par \par \par Surgical Final Report\par \par \par \par \par Case reported to Tumor Registry.\par \par Verified by: Rosa Adams M.D.\par (Electronic Signature)\par Reported on: 09/23/20 12:29 EDT, [address],\[place] [address]\par Phone: [phone]   Fax: [phone]\par _________________________________________________________________\par \par Clinical Information\par R97.20\par PSA elevation, negative MRI\par Surgical procedure: Transperineal prostate biopsy\par \par Gross Description\par 1.  Specimen is received in formalin in container labeled as\par "right posterior medial apex" : Consists of a single core of\par white-tan tissue measuring 0.5 x 0.1 cm. All submitted in one\par cassette.\par \par 2.  Specimen is received in formalin in container labeled as\par "right posterior medial base": Consists of a single core of\par white-tan tissue measuring 0.6 x 0.1 cm. All submitted in one\par cassette.\par \par 3.  Specimen is received in formalin in container labeled as\par "right posterior lateral apex": Consists of a single core of\par white-tan tissue measuring 0.5 x 0.1 cm. All submitted in one\par cassette.\par \par 4.  Specimen is received in formalin in container labeled as\par "right posterior lateral base": Consists of a single core of\par white-tan tissue measuring 0.4 x 0.1 cm. All submitted in one\par cassette.\par \par 5. Specimen is received in formalin in container labeled as\par "right lateral": Consists of a single core of white-tan tissue\par measuring 0.7 x 0.1 cm. All submitted in one cassette.\par \par 6.  Specimen is received in formalin in container labeled as\par "right anterior": Consists of a single core of white-tan tissue\par measuring 1.4 x 0.1 cm. All submitted in one cassette.\par \par 7.  Specimen is received in formalin in container labeled as\par "left posterior medial apex" : Consists of a single core of\par white-tan tissue measuring 0.6 x 0.1 cm. All submitted in one\par cassette.\par \par \par \par \par \par \par CORIN STOUT                       3\par \par \par \par Surgical Final Report\par \par \par \par \par 8.  Specimen is received in formalin in container labeled as\par "left posterior medial base": Consists of a single core of white-\par tan tissue measuring 0.5 x 0.1 cm. All submitted in one cassette.\par \par 9.  Specimen is received in formalin in container labeled as\par "left posterior lateral apex": Consists of a single core of\par white-tan tissue measuring 0.5 x 0.1 cm. All submitted in one\par cassette.\par \par 10.  Specimen is received in formalin in container labeled as\par "left posterior lateral base": Consists of a single fragment of\par white-tan tissue measuring 0.2 x 0.1 cm. All submitted in one\par cassette.\par \par 11. Specimen is received in formalin in container labeled as\par "left lateral": Consists of a single core of white-tan tissue\par measuring 0.8 x 0.1 cm. All submitted in one cassette.\par \par 12.  Specimen is received in formalin in container labeled as\par "left anterior: Consists of a single core of white-tan tissue\par measuring 0.4 x 0.1 cm. All submitted in one cassette.\par \par All cores are inked with Eosin.

## 2023-01-09 PROBLEM — I10 BENIGN ESSENTIAL HTN: Status: ACTIVE | Noted: 2019-02-11

## 2023-01-11 ENCOUNTER — APPOINTMENT (OUTPATIENT)
Dept: RADIATION ONCOLOGY | Facility: HOSPITAL | Age: 73
End: 2023-01-11
Payer: MEDICARE

## 2023-01-11 ENCOUNTER — OUTPATIENT (OUTPATIENT)
Dept: OUTPATIENT SERVICES | Facility: HOSPITAL | Age: 73
LOS: 1 days | Discharge: HOME | End: 2023-01-11

## 2023-01-11 ENCOUNTER — LABORATORY RESULT (OUTPATIENT)
Age: 73
End: 2023-01-11

## 2023-01-11 VITALS
OXYGEN SATURATION: 97 % | BODY MASS INDEX: 33.97 KG/M2 | WEIGHT: 230 LBS | TEMPERATURE: 97.1 F | DIASTOLIC BLOOD PRESSURE: 89 MMHG | HEART RATE: 90 BPM | RESPIRATION RATE: 16 BRPM | SYSTOLIC BLOOD PRESSURE: 152 MMHG

## 2023-01-11 DIAGNOSIS — C61 MALIGNANT NEOPLASM OF PROSTATE: ICD-10-CM

## 2023-01-11 DIAGNOSIS — H26.9 UNSPECIFIED CATARACT: Chronic | ICD-10-CM

## 2023-01-11 PROCEDURE — 99213 OFFICE O/P EST LOW 20 MIN: CPT

## 2023-01-11 RX ORDER — TAMSULOSIN HYDROCHLORIDE 0.4 MG/1
0.4 CAPSULE ORAL
Qty: 60 | Refills: 5 | Status: DISCONTINUED | COMMUNITY
Start: 2021-01-25 | End: 2023-01-11

## 2023-01-12 NOTE — DATA REVIEWED
[FreeTextEntry1] : PSA (ng/mL)\par 12/5/22 0.95\par 8/22/22 0.66\par 5/2/22 1.31\par 2/14/22 1.45\par 1/10/22 2.10\par 12/7/21 2.69\par 1/25/21 2.67\par 12/20/20- SBRT 40Gy/5fx\par 8/2/2020 - 7.15 ng/mL\par 7/16/2020 - 6.1 ng/mL\par 1/30/2020 - 5.2 ng/mL\par 12/12/2019 - 4.6 ng/mL\par 2/26/2018 - 2.4 ng/mL\par

## 2023-01-12 NOTE — HISTORY OF PRESENT ILLNESS
22-Aug-2020 14:49 [FreeTextEntry1] : 1/11/2023 Follow up after SBRT to the prostate completed 12/2020. Most recent PSA from 12/6/2022 was 0.95. Prior to that it was 0.66 in 8/2022. Pt continues to complain about flank pain that radiates to lower abdominal area and some constipation. He follows up with his nephrologist, Dr Barnes in March. He also c/o of some mild pain at his penis when he voids that comes and goes. He has frequency and nocturia. He gets up to void 8-9 times at night and at times feels he doesn’t fully empty his bladder. He currently does not take flomax but takes Silodisin. \par \par \par 07/28/2021 7 month f/u for SBRT to the prostate. Presently patient denies pain while urinating, only complaint is frequency and urgency. Recently he has had some abdominal discomfort, and constipation. He does get relief after a bowel movement. He is taking tamsulosin at HS.   PSA from 4/3 was 1.3.  \par \par \par 1/25/2021 One month f/u for SBRT to the prostate. Presently patient denies pain while urinating, only complaint is frequency and urgency. Recently he has had some abdominal discomfort, and constipation. He does get relief after a bowel movement.  He is taking tamsulosin at HS.  \par \par  Patient is a 70 year old gentlemen whose PSA was 2.4 in 2/18.  It then went to 4.6 in 12/19.     Given that he is off and on with Avodart, the true behavior is more opaque.  An MRI done in Feb 2020 only noted a low risk (PI-RADS 2) lesion. The gland measured 58 ml.  However the PSA continued to rise and was 7.15 on 8/2/2020. by August the 4K was quite elevated at 63%.  He is  s/p core biopsy on 9/23/2020 and pathology shows adenocarcinoma of the prostate, right anterior with a [place] score 3 + 4 = 7 involving 50 % (6 mm in length) 1 of 12 cores positive.  \par \par PSA \par 8/2/2020 - 7.15 ng/mL\par 7/16/2020 -  6.1 ng/mL\par 1/30/2020 - 5.2 ng/mL\par 12/12/2019 - 4.6 ng/mL\par 2/26/2018 - 2.4 ng/mL\par \par Pt reports of urinary frequency, will get up 3-5 times during the night, weak stream and urinary urgency. worsening in the last 6 months.  He denies hematuria and dysuria. He states, "not really" sexually active, loss of interest.   \par Patient was seen by Dr Talamantes approximately 3 weeks ago for surgical interventions.  Pt and Pt's son reports,  they would rather avoid surgery and want to discuss radiotherapy.   \par Urologist  Dr. Shepard/Dr. Stevens\par \par 12/14/2020: follow up: Patient reports of urinary hesitation and dribbling.  He denies dysuria, foul smell on urination, CP and SOB.\par \par

## 2023-01-12 NOTE — REVIEW OF SYSTEMS
[SOB on Exertion] : shortness of breath during exertion [Abdominal Pain] : abdominal pain [Constipation] : constipation [Nocturia] : nocturia [Urinary Frequency] : urinary frequency [Joint Pain] : joint pain [Dizziness] : dizziness [Negative] : Heme/Lymph [IPSS Score (0-40): ___] : IPSS score: [unfilled] [EPIC-CP Score (0-60): ___] : EPIC-CP score: [unfilled] [Shortness Of Breath] : no shortness of breath [Wheezing] : no wheezing [Cough] : no cough [Vomiting] : no vomiting [Diarrhea] : no diarrhea [Difficulty Walking] : no difficulty walking [FreeTextEntry3] : cataract surgery right eye [FreeTextEntry7] : lower abdominal pain for over one year (starts in flank area and radiates to front area) [de-identified] : takes meclizine

## 2023-01-12 NOTE — PHYSICAL EXAM
[General Appearance - Well Developed] : well developed [General Appearance - Alert] : alert [General Appearance - In No Acute Distress] : in no acute distress [Heart Rate And Rhythm] : heart rate and rhythm were normal [Heart Sounds] : normal S1 and S2 [Oriented To Time, Place, And Person] : oriented to person, place, and time [Abdomen Soft] : soft [Nondistended] : nondistended [Normal] : normal spine exam without palpable tenderness, no kyphosis or scoliosis

## 2023-01-12 NOTE — END OF VISIT
[FreeTextEntry3] : MD Note: I was present with the N.P. during the key portions of the history and exam. I agree with the findings and plan as documented in the N.P's note, unless noted below. \par I was physically present for the key portions of the evaluation and management service provided. I agree with the history and physical, and plan which I have reviewed and edited where appropriate.\par \par 71 yo man w/ David 3+4 PCa and iPSA 7.15 s/p SBRT on 12/22/20. He last followed up with us in July 2021. He is 2 years out from his treatment and his PSA trended up a bit from 0.66 (8/20/22) --> 0.95 (12/6/22). This is likely a benign PSA bounce. He has urinary frequency and pain--will order UA/UC and have him follow up with his urologist and nephrologist as scheduled. RTC in 6 months w/ repeat PSA. [Time Spent: ___ minutes] : I have spent [unfilled] minutes of time on the encounter.

## 2023-01-12 NOTE — DISEASE MANAGEMENT
[Clinical] : TNM Stage: c [IIA] : IIA [FreeTextEntry4] : Adenocarcinoma of the prostate [TTNM] : 1c [NTNM] : 0 [MTNM] : 0 [de-identified] : prostate

## 2023-01-19 ENCOUNTER — NON-APPOINTMENT (OUTPATIENT)
Age: 73
End: 2023-01-19

## 2023-01-19 LAB
APPEARANCE: CLEAR
APPEARANCE: CLEAR
BILIRUBIN URINE: NEGATIVE
BILIRUBIN URINE: NEGATIVE
BLOOD URINE: ABNORMAL
BLOOD URINE: ABNORMAL
COLOR: NORMAL
COLOR: NORMAL
GLUCOSE QUALITATIVE U: NEGATIVE
GLUCOSE QUALITATIVE U: NEGATIVE
KETONES URINE: NEGATIVE
KETONES URINE: NEGATIVE
LEUKOCYTE ESTERASE URINE: NEGATIVE
LEUKOCYTE ESTERASE URINE: NEGATIVE
NITRITE URINE: NEGATIVE
NITRITE URINE: NEGATIVE
PH URINE: 5.5
PH URINE: 5.5
PROTEIN URINE: NEGATIVE
PROTEIN URINE: NEGATIVE
SPECIFIC GRAVITY URINE: 1.02
SPECIFIC GRAVITY URINE: 1.02
UROBILINOGEN URINE: NORMAL
UROBILINOGEN URINE: NORMAL

## 2023-03-07 ENCOUNTER — APPOINTMENT (OUTPATIENT)
Dept: PULMONOLOGY | Facility: CLINIC | Age: 73
End: 2023-03-07
Payer: MEDICARE

## 2023-03-07 VITALS
OXYGEN SATURATION: 97 % | BODY MASS INDEX: 33.92 KG/M2 | HEART RATE: 78 BPM | HEIGHT: 69 IN | WEIGHT: 229 LBS | SYSTOLIC BLOOD PRESSURE: 128 MMHG | RESPIRATION RATE: 16 BRPM | DIASTOLIC BLOOD PRESSURE: 79 MMHG

## 2023-03-07 PROCEDURE — 99203 OFFICE O/P NEW LOW 30 MIN: CPT

## 2023-03-07 NOTE — PHYSICAL EXAM
[No Acute Distress] : no acute distress [III] : Mallampati Class: III [Normal Appearance] : normal appearance [No Neck Mass] : no neck mass [Normal Rate/Rhythm] : normal rate/rhythm [Normal S1, S2] : normal s1, s2 [No Murmurs] : no murmurs [No Resp Distress] : no resp distress [Clear to Auscultation Bilaterally] : clear to auscultation bilaterally [No Abnormalities] : no abnormalities [Benign] : benign [Normal Gait] : normal gait [No Cyanosis] : no cyanosis [FROM] : FROM [Normal Color/ Pigmentation] : normal color/ pigmentation [No Focal Deficits] : no focal deficits [Oriented x3] : oriented x3 [Normal Affect] : normal affect

## 2023-03-07 NOTE — HISTORY OF PRESENT ILLNESS
[TextBox_4] : 72 years old presented for above he saw Dr. Graves 2019 where he had sleep study done showed moderate sleep apnea he reports compliance and benefiting from the machine however the main issue is dry mouth.  Since the study he lost almost 25 pounds.  He reports remote history of smoking.

## 2023-03-07 NOTE — DISCUSSION/SUMMARY
[FreeTextEntry1] : Moderate ZAID on sleep study done in 2019 doing well with the pressure of 9 compliant and benefiting however a dry mouth discussed at length the patient will will try to change the mask to full facemask if no improvement might need to repeat titration.  Counseled about weight loss.

## 2023-03-13 ENCOUNTER — RESULT CHARGE (OUTPATIENT)
Age: 73
End: 2023-03-13

## 2023-03-20 ENCOUNTER — APPOINTMENT (OUTPATIENT)
Dept: OTOLARYNGOLOGY | Facility: CLINIC | Age: 73
End: 2023-03-20
Payer: MEDICARE

## 2023-03-20 VITALS — BODY MASS INDEX: 33.82 KG/M2 | WEIGHT: 229 LBS

## 2023-03-20 DIAGNOSIS — H92.09 OTALGIA, UNSPECIFIED EAR: ICD-10-CM

## 2023-03-20 DIAGNOSIS — H61.22 IMPACTED CERUMEN, LEFT EAR: ICD-10-CM

## 2023-03-20 PROCEDURE — 31575 DIAGNOSTIC LARYNGOSCOPY: CPT

## 2023-03-20 PROCEDURE — 69210 REMOVE IMPACTED EAR WAX UNI: CPT

## 2023-03-20 PROCEDURE — 99213 OFFICE O/P EST LOW 20 MIN: CPT | Mod: 25

## 2023-03-20 NOTE — PHYSICAL EXAM
[de-identified] : left impacted wax cleaned with curette [Normal] : mucosa is normal [Midline] : trachea located in midline position

## 2023-03-20 NOTE — HISTORY OF PRESENT ILLNESS
[FreeTextEntry1] : Patient presents today c/o sharp pain in left ear , throat pain.  Patient states this has been going on for long time on and off.  He has acid reflux and is taking Famotidine.  He has no trouble swallowing . also complains of intermittent ear discomfort on left side.

## 2023-03-20 NOTE — REASON FOR VISIT
[Subsequent Evaluation] : a subsequent evaluation for [FreeTextEntry2] : sharp pain in left ear , throat pain

## 2023-03-21 ENCOUNTER — RESULT CHARGE (OUTPATIENT)
Age: 73
End: 2023-03-21

## 2023-03-21 ENCOUNTER — APPOINTMENT (OUTPATIENT)
Dept: CARDIOLOGY | Facility: CLINIC | Age: 73
End: 2023-03-21
Payer: MEDICARE

## 2023-03-21 VITALS
TEMPERATURE: 97.7 F | DIASTOLIC BLOOD PRESSURE: 76 MMHG | WEIGHT: 228 LBS | SYSTOLIC BLOOD PRESSURE: 140 MMHG | BODY MASS INDEX: 33.77 KG/M2 | HEIGHT: 69 IN

## 2023-03-21 VITALS — HEART RATE: 74 BPM

## 2023-03-21 PROCEDURE — 93000 ELECTROCARDIOGRAM COMPLETE: CPT

## 2023-03-21 PROCEDURE — 99214 OFFICE O/P EST MOD 30 MIN: CPT

## 2023-03-21 RX ORDER — ASPIRIN 81 MG/1
81 TABLET, COATED ORAL
Refills: 0 | Status: DISCONTINUED | COMMUNITY
End: 2023-03-21

## 2023-03-21 RX ORDER — OMEPRAZOLE 40 MG/1
40 CAPSULE, DELAYED RELEASE ORAL
Refills: 3 | Status: DISCONTINUED | COMMUNITY
End: 2023-03-21

## 2023-03-21 RX ORDER — PREGABALIN 100 MG/1
100 CAPSULE ORAL 3 TIMES DAILY
Refills: 0 | Status: DISCONTINUED | COMMUNITY
End: 2023-03-21

## 2023-03-21 RX ORDER — ROSUVASTATIN CALCIUM 10 MG/1
10 TABLET, FILM COATED ORAL
Qty: 90 | Refills: 0 | Status: DISCONTINUED | COMMUNITY
Start: 2020-06-03 | End: 2023-03-21

## 2023-03-21 RX ORDER — PREDNISONE 20 MG/1
20 TABLET ORAL
Qty: 30 | Refills: 1 | Status: DISCONTINUED | COMMUNITY
Start: 2021-01-11 | End: 2023-03-21

## 2023-03-21 RX ORDER — LUBIPROSTONE 24 UG/1
24 CAPSULE, GELATIN COATED ORAL TWICE DAILY
Refills: 0 | Status: DISCONTINUED | COMMUNITY
Start: 2020-09-17 | End: 2023-03-21

## 2023-03-21 RX ORDER — METHYLPREDNISOLONE 4 MG/1
4 TABLET ORAL
Qty: 1 | Refills: 0 | Status: DISCONTINUED | COMMUNITY
Start: 2020-12-16 | End: 2023-03-21

## 2023-03-21 NOTE — ASSESSMENT
[FreeTextEntry1] : HARDEN - r/o progression of CAD\par PVC's - isolated\par BP - C/w labetalol, amlodipine. Repeat BP check\par DL - on Crestor. check labs with PMD\par Prostate CA - f/u with urology\par Negative stress echo 05/2019. \par Cath report - non-obstructive 2015\par Repeat stress echo to assess for ischemia.\par c/w primary prevention.\par weight loss discussed.\par Return in 3 months

## 2023-03-21 NOTE — HISTORY OF PRESENT ILLNESS
[FreeTextEntry1] : 74 y/o male with history of HTN, DL, non-obstructive CAD (cath in 2015 - mild plaque). Presents for f/u. No chest pain. No syncope. Gained weight. BP is elevated today - usually controlled. Had a stress echo 5/14/19, which was negative for ischemia. Old LBBB on ECG. 2d echo noted - normal LV function. Reports HARDEN. Right-sided chest pain.

## 2023-04-03 ENCOUNTER — APPOINTMENT (OUTPATIENT)
Dept: CARDIOLOGY | Facility: CLINIC | Age: 73
End: 2023-04-03

## 2023-04-24 ENCOUNTER — OUTPATIENT (OUTPATIENT)
Dept: OUTPATIENT SERVICES | Facility: HOSPITAL | Age: 73
LOS: 1 days | End: 2023-04-24
Payer: MEDICARE

## 2023-04-24 ENCOUNTER — LABORATORY RESULT (OUTPATIENT)
Age: 73
End: 2023-04-24

## 2023-04-24 ENCOUNTER — APPOINTMENT (OUTPATIENT)
Dept: HEMATOLOGY ONCOLOGY | Facility: CLINIC | Age: 73
End: 2023-04-24

## 2023-04-24 DIAGNOSIS — D64.9 ANEMIA, UNSPECIFIED: ICD-10-CM

## 2023-04-24 DIAGNOSIS — H26.9 UNSPECIFIED CATARACT: Chronic | ICD-10-CM

## 2023-04-24 LAB
ALBUMIN SERPL ELPH-MCNC: 4.6 G/DL
ALP BLD-CCNC: 54 U/L
ALT SERPL-CCNC: 15 U/L
ANION GAP SERPL CALC-SCNC: 13 MMOL/L
AST SERPL-CCNC: 21 U/L
BILIRUB DIRECT SERPL-MCNC: <0.2 MG/DL
BILIRUB INDIRECT SERPL-MCNC: >0.6 MG/DL
BILIRUB SERPL-MCNC: 0.8 MG/DL
BUN SERPL-MCNC: 17 MG/DL
CALCIUM SERPL-MCNC: 9.1 MG/DL
CHLORIDE SERPL-SCNC: 106 MMOL/L
CO2 SERPL-SCNC: 22 MMOL/L
CREAT SERPL-MCNC: 1.3 MG/DL
EGFR: 57 ML/MIN/1.73M2
GLUCOSE SERPL-MCNC: 115 MG/DL
HCT VFR BLD CALC: 39.7 %
HGB BLD-MCNC: 12.8 G/DL
MCHC RBC-ENTMCNC: 28.7 PG
MCHC RBC-ENTMCNC: 32.2 G/DL
MCV RBC AUTO: 89 FL
PLATELET # BLD AUTO: 149 K/UL
PMV BLD: 10.3 FL
POTASSIUM SERPL-SCNC: 4.9 MMOL/L
PROT SERPL-MCNC: 7.1 G/DL
RBC # BLD: 4.46 M/UL
RBC # FLD: 11.8 %
SODIUM SERPL-SCNC: 141 MMOL/L
WBC # FLD AUTO: 4.06 K/UL

## 2023-04-24 PROCEDURE — 80076 HEPATIC FUNCTION PANEL: CPT

## 2023-04-24 PROCEDURE — 80048 BASIC METABOLIC PNL TOTAL CA: CPT

## 2023-04-24 PROCEDURE — 36415 COLL VENOUS BLD VENIPUNCTURE: CPT

## 2023-04-24 PROCEDURE — 85027 COMPLETE CBC AUTOMATED: CPT

## 2023-04-24 PROCEDURE — 84153 ASSAY OF PSA TOTAL: CPT

## 2023-04-25 DIAGNOSIS — D64.9 ANEMIA, UNSPECIFIED: ICD-10-CM

## 2023-04-25 LAB — PSA SERPL-MCNC: 0.33 NG/ML

## 2023-04-26 ENCOUNTER — OUTPATIENT (OUTPATIENT)
Dept: OUTPATIENT SERVICES | Facility: HOSPITAL | Age: 73
LOS: 1 days | End: 2023-04-26
Payer: MEDICARE

## 2023-04-26 ENCOUNTER — APPOINTMENT (OUTPATIENT)
Dept: HEMATOLOGY ONCOLOGY | Facility: CLINIC | Age: 73
End: 2023-04-26

## 2023-04-26 ENCOUNTER — APPOINTMENT (OUTPATIENT)
Dept: HEMATOLOGY ONCOLOGY | Facility: CLINIC | Age: 73
End: 2023-04-26
Payer: MEDICARE

## 2023-04-26 VITALS
HEART RATE: 91 BPM | SYSTOLIC BLOOD PRESSURE: 146 MMHG | RESPIRATION RATE: 16 BRPM | HEIGHT: 69 IN | BODY MASS INDEX: 34.07 KG/M2 | DIASTOLIC BLOOD PRESSURE: 72 MMHG | TEMPERATURE: 97.9 F | WEIGHT: 230 LBS

## 2023-04-26 DIAGNOSIS — H26.9 UNSPECIFIED CATARACT: Chronic | ICD-10-CM

## 2023-04-26 DIAGNOSIS — C61 MALIGNANT NEOPLASM OF PROSTATE: ICD-10-CM

## 2023-04-26 DIAGNOSIS — D64.9 ANEMIA, UNSPECIFIED: ICD-10-CM

## 2023-04-26 PROCEDURE — 99214 OFFICE O/P EST MOD 30 MIN: CPT

## 2023-04-26 NOTE — ASSESSMENT
[FreeTextEntry1] : # Adenocarcinoma of the prostate in 09/2020, Grade Group 2, (David score 3+4=7) ; s/p RT with Dr. Bowers in 12/2020, 14 DOSES.\par ECOG 1\par - PSA after RT came down to 2 and now trending down to 1.31 on 5/2/22\par - explained that decrease of PSA to zeros would be expected after the prostatectomy, not after RT. \par - MRI prostate 11/2021 AND bone scan 12/2021 do not show any sign of disease; I do not suspect recurrence \par - followup with URO, Dr. Reveles, next available regarding bothersome LUTs + ED\par - followup with [facility],  as indicated \par - PET/CT PSMA shows no metastatic disease \par - will c/w active surveillance ; PSA 0.9ng/mL from 12/2022 \par - PSA 0.33 4/23.\par \par *PSA recurrence is defined as 1) PSA increase by 2 ng/mL or more above the homer PSA is the standard definition for PSA recurrence after EBRT w/ or w/o HT. And 2) A recurrence evaluation should be considered when PSA has been confirmed to increasing after radiation even if the increase above homer is not yet 2 ng/mL.\par \par # Anemia, due to B12 deficiency; MMA previously high\par - c/w Vitamin B12 and folate supplementation , refills sent\par \par seen/examined w/ hemonc fellow; note reviewed; case discussed\par 74 yo man with prostate adenocarcinoma, David 3+4, grade group 2, treated with radiation therapy at [facility] completed 12/2020. No adjuvant hormonal therapy was administered. He was referred to me due to PSA >2; pt was explained that PSA is not expected to reach ZERO after RT and it is possible to have variations. He still wanted to get PSMA, which was done 03/2022 and failed to reveal metastatic disease; in fact, his PSA is 0.33. Explaiend that there is no convincing reason to follow with medical oncology at this time. Instead, he would be better followed by either [facility] team who administered therapy or urology team or both. \par

## 2023-04-26 NOTE — REVIEW OF SYSTEMS
[Abdominal Pain] : abdominal pain [Negative] : Allergic/Immunologic [Fever] : no fever [Chills] : no chills [Depression] : no depression [Easy Bleeding] : no tendency for easy bleeding [FreeTextEntry7] : still reports occasional lower abd pain / around waist [FreeTextEntry8] : urinary frequency [de-identified] : reports short term memory issues x 1 year [FreeTextEntry9] : reports pain around waist line and around lowed abd [de-identified] : reports erectile dysfunction following RT

## 2023-04-26 NOTE — RESULTS/DATA
[FreeTextEntry1] : (9.17.2020) \par Surgical Final Report\par \par \par \par \par Final Diagnosis\par \par 1. Prostate, right posterior median apex, biopsy\par - Benign prostatic tissue\par \par 2. Prostate, right posterior median base, biopsy\par - Benign prostatic tissue\par \par 3. Prostate, right posterior lateral apex, biopsy\par - Benign prostatic tissue\par \par 4. Prostate, right posterior lateral base, biopsy\par - Benign prostatic tissue\par \par 5. Prostate, right lateral, biopsy\par - Benign prostatic tissue\par \par 6. Prostate, right anterior, biopsy\par -  Adenocarcinoma of the prostate, Prognostic Grade Group 2\par (David score 3+4=7) involving 50% (6 mm in length) of 1 of 1\par core(s). [place] pattern 4 involves 10% of the tumor.\par \par 7. Prostate, left posterior medial apex, biopsy\par - Benign prostatic tissue\par \par 8. Prostate, left posterior medial base, biopsy\par - Benign prostatic tissue\par \par 9. Prostate, left posterior lateral apex, biopsy\par - Benign prostatic tissue\par \par 10. Prostate, left posterior lateral base, biopsy\par - Fibromuscular tissue\par \par 11. Prostate, left lateral, biopsy\par - Benign prostatic tissue\par \par 12. Prostate, left anterior, biopsy\par - Benign prostatic tissue\par \par Dr. Corin Stevens was notified of the diagnosis on 09/23/20.\par \par \par \par \par \par \par \par CORIN STOUT                       3\par \par \par \par Surgical Final Report\par \par \par \par \par Case reported to Tumor Registry.\par \par Verified by: Rosa Adams M.D.\par (Electronic Signature)\par Reported on: 09/23/20 12:29 EDT, [address] [facility] [facility],\[place] [address]\par Phone: [phone]   Fax: [phone]\par _________________________________________________________________\par \par Clinical Information\par R97.20\par PSA elevation, negative MRI\par Surgical procedure: Transperineal prostate biopsy\par \par Gross Description\par 1.  Specimen is received in formalin in container labeled as\par "right posterior medial apex" : Consists of a single core of\par white-tan tissue measuring 0.5 x 0.1 cm. All submitted in one\par cassette.\par \par 2.  Specimen is received in formalin in container labeled as\par "right posterior medial base": Consists of a single core of\par white-tan tissue measuring 0.6 x 0.1 cm. All submitted in one\par cassette.\par \par 3.  Specimen is received in formalin in container labeled as\par "right posterior lateral apex": Consists of a single core of\par white-tan tissue measuring 0.5 x 0.1 cm. All submitted in one\par cassette.\par \par 4.  Specimen is received in formalin in container labeled as\par "right posterior lateral base": Consists of a single core of\par white-tan tissue measuring 0.4 x 0.1 cm. All submitted in one\par cassette.\par \par 5. Specimen is received in formalin in container labeled as\par "right lateral": Consists of a single core of white-tan tissue\par measuring 0.7 x 0.1 cm. All submitted in one cassette.\par \par 6.  Specimen is received in formalin in container labeled as\par "right anterior": Consists of a single core of white-tan tissue\par measuring 1.4 x 0.1 cm. All submitted in one cassette.\par \par 7.  Specimen is received in formalin in container labeled as\par "left posterior medial apex" : Consists of a single core of\par white-tan tissue measuring 0.6 x 0.1 cm. All submitted in one\par cassette.\par \par \par \par \par \par \par CORIN STOUT                       3\par \par \par \par Surgical Final Report\par \par \par \par \par 8.  Specimen is received in formalin in container labeled as\par "left posterior medial base": Consists of a single core of white-\par tan tissue measuring 0.5 x 0.1 cm. All submitted in one cassette.\par \par 9.  Specimen is received in formalin in container labeled as\par "left posterior lateral apex": Consists of a single core of\par white-tan tissue measuring 0.5 x 0.1 cm. All submitted in one\par cassette.\par \par 10.  Specimen is received in formalin in container labeled as\par "left posterior lateral base": Consists of a single fragment of\par white-tan tissue measuring 0.2 x 0.1 cm. All submitted in one\par cassette.\par \par 11. Specimen is received in formalin in container labeled as\par "left lateral": Consists of a single core of white-tan tissue\par measuring 0.8 x 0.1 cm. All submitted in one cassette.\par \par 12.  Specimen is received in formalin in container labeled as\par "left anterior: Consists of a single core of white-tan tissue\par measuring 0.4 x 0.1 cm. All submitted in one cassette.\par \par All cores are inked with Eosin.

## 2023-04-26 NOTE — HISTORY OF PRESENT ILLNESS
[de-identified] : Mr. CORIN STOUT is a 71 year old male here today for evaluation and management of Prostate Cancer.  \par \par He was referred by URO, Dr. Reveles.  CORIN is a 71 year old M of [place] descent with PMHx including CKD, dyslipidemia, HTN, LPRD, LBBB and prostate cancer, who presents to clinic to establish care.   His PSA was 2.4 in 2/18. It then went to 4.6 in 12/19.  Given that he was off and on with Avodart, the true behavior is more opaque.  An MRI done in Feb 2020 only noted a low risk (PI-RADS 2) lesion.  However the PSA continued to rise and was 7.15 on 8/2/2020. by August the 4K was quite elevated at 63%. He is s/p core biopsy on 9/23/2020 and pathology shows adenocarcinoma of the prostate, right anterior with a David score 3 + 4 = 7 involving 50 % (6 mm in length) 1 of 12 cores positive.  At that time, patient and son made decision he would rather avoid surgery and want to discuss radiotherapy.  Patient completed RT with Dr. Bowers in 12/2020.  PSA is slowly rising, now up to 2.05ng/mL from 09/2021.  Patient denies fever, chills, nausea, vomiting, dyspnea, unintentional weight loss or hematuria.  Patient transiently developed urinary hesitancy following RT which resolved.  He still reports urinary frequency now.  Patient reports no pertinent family history of malignancy or blood/clotting disorder. \par \par RADIOLOGIC WORKUP\par bone scan (12.12.2021) IMPRESSION: No definite evidence to suggest metastatic bone disease.\par MR Prostate (11.22.2021) IMPRESSION:1. No MRI suspicious prostate lesions; increased diffuse homogeneous low T2 signal within the peripheral zone, without corresponding diffusion abnormality, likely reflecting sequela of radiation fibrosis/atrophy. 2. Since November 23, 2020, no significant change in hemorrhagic and atrophic right-sided seminal vesicles.\par MR Prostate (11.23.2020) IMPRESSION:1.  Known clinically significant prostate cancer is not readily evident by MRI. No gross evidence of extraprostatic tumor on this noncontrast study. Moderate BPH.2.  No pelvic lymphadenopathy. 3.  Interval signal change of the right seminal vesicle, may represent postbiopsy or postinflammatory change.\par MR Prostate (3.24.2020) IMPRESSION:PI-RADSv2 Category 2 - Low (clinically significant cancer is unlikely to be present).  No dominant lesion. Moderate BPH.\par \par LAB WORKUP\par (9.10.2021) PSA 2.05\par (4.3.2021) PSA 1.3\par (1.25.2021) PSA 2.67\par (8.2.2020)  PSA 7.15 ng/mL\par (7.16.2020) PSA 6.1 ng/mL\par (3.3.2020) WBC 5.2, Hgb 13.7, Hct 41.2, MCV 89.2, \par (1.30.2020)  PSA 5.2 ng/mL\par (19.20.2019) WBC 5.35, Hgb 13.3, Hct 41.2, MCV 91.2, , Hep A (+)\par (11.21.2018) WBC 4.81, Hgb 12.9, Hct 40.9, MCV 89.7, \par (1.12.2017) WBC 6.45, Hgb 12.1, Hct 38.6, MCV 91.7, \par (01.25.2021) PSA was 2 \par (04.2021) PSA was 1.4 \par (09.2021) PSA was 2.04\par (02.14.2022) PSA was 1.45\par \par PATHOLOGY\par (see results section)\par \par HCM\par Colonoscopy done around 2018 with Dr. Flynn, due in April 2022\par Upper Endoscopy done around 2018 with Dr. Flynn , pt not aware of results\par COVID Vaccinated (x3) [de-identified] : 2/15/22\par Patient is here for a follow-up visit for Prostate Cancer.  Reviewed most recent PSA which is stable at 1.45ng/mL.  Patient denies fever, chills, nausea, vomiting, dyspnea or bleeding.  He reports lower pelvic/bladder discomfort intermittently over the last 2 weeks or so.  He notes urinary frequency (up to 7x per day) and nocturia over the last few months.  He follows with Dr. Abdirizak ROMERO and has revisit pending for next week.  He does not currently receive ADT injections.  \par \par 5/3/22 \par Pt is here for follow up. Pt underwent PET/CT PSMA scan which shows no evidenc of metastatic disease. Otherwise, pt feels well and denies any other complaints. 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 Further \par evaluation may be obtained with contrast-enhanced CT chest.\par \par 8/23/22\par Patient is here for a follow-up visit for Prostate Cancer.  Reviewed most recent PSA which is stable at 0.66ng/mL.  Patient denies fever, chills, nausea, vomiting, dyspnea or bleeding.  He follows with Dr. Abdirizak ROMERO ; not currently receive ADT injections.  Weight is stable.  \par \par 12/28/22\par Patient is here for a follow-up visit for Prostate Cancer.  He reports intermittent lower abdominal pain, frequency. Reviewed most recent PSA which is stable at 0.9ng/mL.  Patient denies fever, chills, nausea, vomiting, dyspnea or bleeding.  He follows with Dr. Abdirizak ROMERO ; not currently receive ADT injections.  Weight is stable.  \par \par 4/26/23\par Pt is here for a follow up of prostate cancer. recent PSA at 0.33, he had intermittent abdominal pain and intermittent urinary frequency and  trouble initiating urination. Patient denies any fevers, hematuria, chills and weight loss. Pt was wondering if he should take any medications for his urinary symptoms.\par

## 2023-04-26 NOTE — CONSULT LETTER
[Dear  ___] : Dear  [unfilled], [Consult Letter:] : I had the pleasure of evaluating your patient, [unfilled]. [Please see my note below.] : Please see my note below. [Sincerely,] : Sincerely, [FreeTextEntry3] : Randy Pulido DO\par Attending Physician,\par Hematology/ Medical Oncology\par [phone] office\par \par  [DrNereyda  ___] : Dr. GROVE

## 2023-04-27 DIAGNOSIS — C61 MALIGNANT NEOPLASM OF PROSTATE: ICD-10-CM

## 2023-05-26 ENCOUNTER — RX RENEWAL (OUTPATIENT)
Age: 73
End: 2023-05-26

## 2023-06-05 ENCOUNTER — APPOINTMENT (OUTPATIENT)
Dept: CARDIOLOGY | Facility: CLINIC | Age: 73
End: 2023-06-05
Payer: MEDICARE

## 2023-06-05 DIAGNOSIS — R07.89 OTHER CHEST PAIN: ICD-10-CM

## 2023-06-05 PROCEDURE — 93320 DOPPLER ECHO COMPLETE: CPT

## 2023-06-05 PROCEDURE — 93325 DOPPLER ECHO COLOR FLOW MAPG: CPT

## 2023-06-05 PROCEDURE — 93351 STRESS TTE COMPLETE: CPT

## 2023-06-06 ENCOUNTER — APPOINTMENT (OUTPATIENT)
Dept: OTOLARYNGOLOGY | Facility: CLINIC | Age: 73
End: 2023-06-06
Payer: MEDICARE

## 2023-06-06 VITALS — HEIGHT: 69 IN | WEIGHT: 230 LBS | BODY MASS INDEX: 34.07 KG/M2

## 2023-06-06 PROCEDURE — 31575 DIAGNOSTIC LARYNGOSCOPY: CPT

## 2023-06-06 PROCEDURE — 99213 OFFICE O/P EST LOW 20 MIN: CPT | Mod: 25

## 2023-06-06 NOTE — HISTORY OF PRESENT ILLNESS
[FreeTextEntry1] : Patient presents today c/o throat pinching, feels like something stuck inside .  Patient states for months he started getting a sensation in his throat of pinching and sometimes feels like something is stuck.  He has no trouble swallowing solids but water sometimes chokes him.

## 2023-06-06 NOTE — REASON FOR VISIT
[Subsequent Evaluation] : a subsequent evaluation for [FreeTextEntry2] : throat pinching, feels like something stuck inside

## 2023-06-06 NOTE — ASSESSMENT
[FreeTextEntry1] : Dysphagia and LPR partially responsive to famotidine therapy (currently using bid)\par Continue famotidine\par Has appt with GI at the end of the month; recommend follow-up with them to look for reasons for refractory reflux symptoms including esophageal abnormalities (e.g. hernia), H. Pylori nfection, etc\par RV after seeing GI

## 2023-06-06 NOTE — PROCEDURE
[None] : none [Flexible Endoscope] : examined with the flexible endoscope [de-identified] : Flexible laryngoscopy performed. Nasal cavity, nasopharynx, oropharynx, hypopharynx, and larynx evaluated. No masses or lesions, bilateral true vocal folds symmetric and mobile.\par \par Reflux changes with interarytenoid/arytenoid erythema/edema. No obvious mucosal masses or lesions

## 2023-06-14 ENCOUNTER — APPOINTMENT (OUTPATIENT)
Dept: RADIATION ONCOLOGY | Facility: HOSPITAL | Age: 73
End: 2023-06-14

## 2023-06-14 NOTE — DISEASE MANAGEMENT
[Clinical] : TNM Stage: c [FreeTextEntry4] : Adenocarcinoma of the prostate [TTNM] : 1c [NTNM] : 0 [MTNM] : 0 [IIA] : IIA [de-identified] : prostate

## 2023-06-14 NOTE — HISTORY OF PRESENT ILLNESS
[FreeTextEntry1] : 6/14/2023: Follow up: SBRT to the prostate completed 12/2020. April 24, 2023 PSA was 0.33ng/ml. Prior to that PSA was 0.95 on 12/6/2022. \par \par \par 1/11/2023 Follow up after SBRT to the prostate completed 12/2020. Most recent PSA from 12/6/2022 was 0.95. Prior to that it was 0.66 in 8/2022. Pt continues to complain about flank pain that radiates to lower abdominal area and some constipation. He follows up with his nephrologist, Dr Barnes in March. He also c/o of some mild pain at his penis when he voids that comes and goes. He has frequency and nocturia. He gets up to void 8-9 times at night and at times feels he doesn’t fully empty his bladder. He currently does not take flomax but takes Silodisin. \par \par \par 07/28/2021 7 month f/u for SBRT to the prostate. Presently patient denies pain while urinating, only complaint is frequency and urgency. Recently he has had some abdominal discomfort, and constipation. He does get relief after a bowel movement. He is taking tamsulosin at HS.   PSA from 4/3 was 1.3.  \par \par \par 1/25/2021 One month f/u for SBRT to the prostate. Presently patient denies pain while urinating, only complaint is frequency and urgency. Recently he has had some abdominal discomfort, and constipation. He does get relief after a bowel movement.  He is taking tamsulosin at HS.  \par \par  Patient is a 70 year old gentlemen whose PSA was 2.4 in 2/18.  It then went to 4.6 in 12/19.     Given that he is off and on with Avodart, the true behavior is more opaque.  An MRI done in Feb 2020 only noted a low risk (PI-RADS 2) lesion. The gland measured 58 ml.  However the PSA continued to rise and was 7.15 on 8/2/2020. by August the 4K was quite elevated at 63%.  He is  s/p core biopsy on 9/23/2020 and pathology shows adenocarcinoma of the prostate, right anterior with a [place] score 3 + 4 = 7 involving 50 % (6 mm in length) 1 of 12 cores positive.  \par \par PSA \par 8/2/2020 - 7.15 ng/mL\par 7/16/2020 -  6.1 ng/mL\par 1/30/2020 - 5.2 ng/mL\par 12/12/2019 - 4.6 ng/mL\par 2/26/2018 - 2.4 ng/mL\par \par Pt reports of urinary frequency, will get up 3-5 times during the night, weak stream and urinary urgency. worsening in the last 6 months.  He denies hematuria and dysuria. He states, "not really" sexually active, loss of interest.   \par Patient was seen by Dr Talamantes approximately 3 weeks ago for surgical interventions.  Pt and Pt's son reports,  they would rather avoid surgery and want to discuss radiotherapy.   \par Urologist  Dr. Shepard/Dr. Stevens\par \par 12/14/2020: follow up: Patient reports of urinary hesitation and dribbling.  He denies dysuria, foul smell on urination, CP and SOB.\par \par

## 2023-06-14 NOTE — REVIEW OF SYSTEMS
[Shortness Of Breath] : no shortness of breath [Wheezing] : no wheezing [Cough] : no cough [SOB on Exertion] : shortness of breath during exertion [Abdominal Pain] : abdominal pain [Vomiting] : no vomiting [Constipation] : constipation [Diarrhea] : no diarrhea [Nocturia] : nocturia [Urinary Frequency] : urinary frequency [IPSS Score (0-40): ___] : IPSS score: [unfilled] [EPIC-CP Score (0-60): ___] : EPIC-CP score: [unfilled] [Joint Pain] : joint pain [Dizziness] : dizziness [Difficulty Walking] : no difficulty walking [Negative] : Heme/Lymph [FreeTextEntry3] : cataract surgery right eye [FreeTextEntry7] : lower abdominal pain for over one year (starts in flank area and radiates to front area) [de-identified] : takes meclizine

## 2023-06-25 LAB — PSA SERPL-MCNC: 0.41 NG/ML

## 2023-07-17 ENCOUNTER — APPOINTMENT (OUTPATIENT)
Dept: CARDIOLOGY | Facility: CLINIC | Age: 73
End: 2023-07-17
Payer: MEDICARE

## 2023-07-17 ENCOUNTER — RESULT CHARGE (OUTPATIENT)
Age: 73
End: 2023-07-17

## 2023-07-17 ENCOUNTER — APPOINTMENT (OUTPATIENT)
Dept: OTOLARYNGOLOGY | Facility: CLINIC | Age: 73
End: 2023-07-17

## 2023-07-17 VITALS
SYSTOLIC BLOOD PRESSURE: 150 MMHG | WEIGHT: 229 LBS | DIASTOLIC BLOOD PRESSURE: 88 MMHG | BODY MASS INDEX: 33.92 KG/M2 | HEIGHT: 69 IN

## 2023-07-17 PROCEDURE — 99213 OFFICE O/P EST LOW 20 MIN: CPT

## 2023-07-17 NOTE — ASSESSMENT
[FreeTextEntry1] : HARDEN - r/o progression of CAD\par PVC's - isolated\par BP - C/w labetalol, amlodipine. Repeat BP check\par DL - on Crestor. check labs with PMD\par Prostate CA - f/u with urology\par Negative stress echo 05/2019. \par Cath report - non-obstructive 2015\par Repeat stress echo is negative for ischemia.\par c/w primary prevention.\par weight loss discussed.\par Return in 6 months

## 2023-07-17 NOTE — HISTORY OF PRESENT ILLNESS
[FreeTextEntry1] : 74 y/o male with history of HTN, DL, non-obstructive CAD (cath in 2015 - mild plaque). Presents for f/u. No chest pain. No syncope. Gained weight. BP is elevated today - usually controlled. Had a stress echo 5/14/19, which was negative for ischemia. Old LBBB on ECG. 2d echo noted - normal LV function. Reports HARDEN. Right-sided chest pain - resolved. Repeat stress echo - negative. Labs noted. LDL 98.

## 2023-09-08 ENCOUNTER — APPOINTMENT (OUTPATIENT)
Dept: OTOLARYNGOLOGY | Facility: CLINIC | Age: 73
End: 2023-09-08

## 2023-10-02 ENCOUNTER — APPOINTMENT (OUTPATIENT)
Dept: OTOLARYNGOLOGY | Facility: CLINIC | Age: 73
End: 2023-10-02
Payer: MEDICARE

## 2023-10-02 DIAGNOSIS — K21.9 GASTRO-ESOPHAGEAL REFLUX DISEASE W/OUT ESOPHAGITIS: ICD-10-CM

## 2023-10-02 DIAGNOSIS — R13.10 DYSPHAGIA, UNSPECIFIED: ICD-10-CM

## 2023-10-02 DIAGNOSIS — R49.0 DYSPHONIA: ICD-10-CM

## 2023-10-02 PROCEDURE — 99213 OFFICE O/P EST LOW 20 MIN: CPT | Mod: 25

## 2023-10-02 PROCEDURE — 31575 DIAGNOSTIC LARYNGOSCOPY: CPT

## 2023-10-02 NOTE — ED ADULT TRIAGE NOTE - HEART RATE (BEATS/MIN)
86 Topical Steroids Counseling: I discussed with the patient that prolonged use of topical steroids can result in the increased appearance of superficial blood vessels (telangiectasias), lightening (hypopigmentation) and thinning of the skin (atrophy).  Patient understands to avoid using high potency steroids in skin folds, the groin or the face.  The patient verbalized understanding of the proper use and possible adverse effects of topical steroids.  All of the patient's questions and concerns were addressed.

## 2023-11-20 ENCOUNTER — OUTPATIENT (OUTPATIENT)
Dept: OUTPATIENT SERVICES | Facility: HOSPITAL | Age: 73
LOS: 1 days | End: 2023-11-20
Payer: MEDICARE

## 2023-11-20 DIAGNOSIS — J90 PLEURAL EFFUSION, NOT ELSEWHERE CLASSIFIED: ICD-10-CM

## 2023-11-20 DIAGNOSIS — H26.9 UNSPECIFIED CATARACT: Chronic | ICD-10-CM

## 2023-11-20 PROCEDURE — 71046 X-RAY EXAM CHEST 2 VIEWS: CPT

## 2023-11-20 PROCEDURE — 71046 X-RAY EXAM CHEST 2 VIEWS: CPT | Mod: 26

## 2023-11-21 DIAGNOSIS — J90 PLEURAL EFFUSION, NOT ELSEWHERE CLASSIFIED: ICD-10-CM

## 2023-12-06 ENCOUNTER — TRANSCRIPTION ENCOUNTER (OUTPATIENT)
Age: 73
End: 2023-12-06

## 2023-12-06 ENCOUNTER — RESULT REVIEW (OUTPATIENT)
Age: 73
End: 2023-12-06

## 2023-12-06 ENCOUNTER — OUTPATIENT (OUTPATIENT)
Dept: OUTPATIENT SERVICES | Facility: HOSPITAL | Age: 73
LOS: 1 days | Discharge: HOME | End: 2023-12-06
Payer: MEDICARE

## 2023-12-06 VITALS
HEART RATE: 77 BPM | RESPIRATION RATE: 15 BRPM | DIASTOLIC BLOOD PRESSURE: 80 MMHG | SYSTOLIC BLOOD PRESSURE: 145 MMHG | OXYGEN SATURATION: 97 %

## 2023-12-06 VITALS
RESPIRATION RATE: 18 BRPM | SYSTOLIC BLOOD PRESSURE: 141 MMHG | DIASTOLIC BLOOD PRESSURE: 66 MMHG | HEIGHT: 69 IN | TEMPERATURE: 98 F | WEIGHT: 229.94 LBS | HEART RATE: 78 BPM

## 2023-12-06 DIAGNOSIS — K57.30 DIVERTICULOSIS OF LARGE INTESTINE WITHOUT PERFORATION OR ABSCESS WITHOUT BLEEDING: ICD-10-CM

## 2023-12-06 DIAGNOSIS — K63.5 POLYP OF COLON: ICD-10-CM

## 2023-12-06 DIAGNOSIS — H26.9 UNSPECIFIED CATARACT: Chronic | ICD-10-CM

## 2023-12-06 PROCEDURE — 88312 SPECIAL STAINS GROUP 1: CPT

## 2023-12-06 PROCEDURE — 88305 TISSUE EXAM BY PATHOLOGIST: CPT | Mod: 26

## 2023-12-06 PROCEDURE — 88312 SPECIAL STAINS GROUP 1: CPT | Mod: 26

## 2023-12-06 PROCEDURE — 88305 TISSUE EXAM BY PATHOLOGIST: CPT

## 2023-12-06 PROCEDURE — 43237 ENDOSCOPIC US EXAM ESOPH: CPT

## 2023-12-06 PROCEDURE — 43239 EGD BIOPSY SINGLE/MULTIPLE: CPT | Mod: XU

## 2023-12-06 RX ORDER — RANITIDINE HYDROCHLORIDE 150 MG/1
1 TABLET, FILM COATED ORAL
Qty: 0 | Refills: 0 | DISCHARGE

## 2023-12-06 RX ORDER — EPLERENONE 50 MG/1
1 TABLET, FILM COATED ORAL
Qty: 0 | Refills: 0 | DISCHARGE

## 2023-12-06 RX ORDER — NALDEMEDINE 0.2 MG/1
1 TABLET ORAL
Qty: 0 | Refills: 0 | DISCHARGE

## 2023-12-06 NOTE — ASU DISCHARGE PLAN (ADULT/PEDIATRIC) - FOLLOW UP APPOINTMENTS
[phone] [facility],  Endoscopy/Ambulatory Surgery [facility],  Endoscopy/Ambulatory Surgery [place]

## 2023-12-06 NOTE — ASU DISCHARGE PLAN (ADULT/PEDIATRIC) - CALL YOUR DOCTOR IF YOU HAVE ANY OF THE FOLLOWING:
Bleeding that does not stop/Swelling that gets worse/Pain not relieved by Medications/Numbness, tingling, color or temperature change to extremity/Nausea and vomiting that does not stop/Excessive diarrhea/Inability to tolerate liquids or foods/Increased irritability or sluggishness

## 2023-12-06 NOTE — ASU PATIENT PROFILE, ADULT - NSICDXPASTMEDICALHX_GEN_ALL_CORE_FT
PAST MEDICAL HISTORY:  Arthritis     Back pain     Borderline diabetes     Enlarged heart     Enlarged prostate     GERD (gastroesophageal reflux disease)     HTN (hypertension)     Hypercholesteremia     Renal disease     Sleep apnea

## 2023-12-06 NOTE — ASU PREOP CHECKLIST - VIA
[facility]  Metabolic Weight Loss Program        Patient's Name: Lakisha Banda  : 1950    This patient is enrolled in [address] [place] [address] Weight Loss Program and attended the required weekly virtual nutrition class hosted via [employer].       Romie Dasilva, MS, RD, LDN stretcher

## 2023-12-06 NOTE — ASU DISCHARGE PLAN (ADULT/PEDIATRIC) - NS MD DC FALL RISK RISK
For information on Fall & Injury Prevention, visit: https://www.[facility]/news/fall-prevention-protects-and-maintains-health-and-mobility OR  https://www.[facility]/news/fall-prevention-tips-to-avoid-injury OR  https://www.cdc.gov/steadi/patient.html For information on Fall & Injury Prevention, visit: https://www.[facility]/news/fall-prevention-protects-and-maintains-health-and-mobility OR  https://www.[facility]/news/fall-prevention-tips-to-avoid-injury OR  https://www.cdc.gov/steadi/patient.html

## 2023-12-06 NOTE — ASU DISCHARGE PLAN (ADULT/PEDIATRIC) - CARE PROVIDER_API CALL
Nina Foley  Gastroenterology  [address] [facility] [place] [address]  Phone: [phone]  Fax: [phone]  Follow Up Time:    Nina Foley  Gastroenterology  [address] [facility] [place] [address]  Phone: [phone]  Fax: [phone]  Follow Up Time:

## 2023-12-06 NOTE — ASU PATIENT PROFILE, ADULT - FALL HARM RISK - UNIVERSAL INTERVENTIONS
Bed in lowest position, wheels locked, appropriate side rails in place/Call bell, personal items and telephone in reach/Instruct patient to call for assistance before getting out of bed or chair/Non-slip footwear when patient is out of bed/[place] to call system/Physically safe environment - no spills, clutter or unnecessary equipment/Purposeful Proactive Rounding/Room/bathroom lighting operational, light cord in reach Bed in lowest position, wheels locked, appropriate side rails in place/Call bell, personal items and telephone in reach/Instruct patient to call for assistance before getting out of bed or chair/Non-slip footwear when patient is out of bed/[place] to call system/Physically safe environment - no spills, clutter or unnecessary equipment/Purposeful Proactive Rounding/Room/bathroom lighting operational, light cord in reach

## 2023-12-06 NOTE — ASU PREOP CHECKLIST - NSBLOODTRANSFCONSENT_GEN_A_CORE
Detail Level: Detailed Render Risk Assessment In Note?: no Recommendation Preamble: The following recommendations were made during the visit: Recommendations (Free Text): I reviewed the potential side effects and complications of 5% fluorouracil cream.\\nPictures of the type of reaction to expect shown.  All questions answered.\\nBegin 5% fluorouracil cream twice a day to balding scalp and face avoiding the periocular and perioral area.\\nCooler baths or showers.\\nPlain Vaseline as often as needed. no

## 2023-12-08 LAB
SURGICAL PATHOLOGY STUDY: SIGNIFICANT CHANGE UP
SURGICAL PATHOLOGY STUDY: SIGNIFICANT CHANGE UP

## 2023-12-10 NOTE — PRE-ANESTHESIA EVALUATION ADULT - NSATTENDATTESTRD_GEN_ALL_CORE
Modesto Barbosa Mary K RN  Phone Number: [phone]     Larry Liz,  All the meds on the med list above are correct.  He has been checking his BPs twice a day and these are the recent numbers for the last few days for mornings and evenings. They have gotten better with the additional recent Lisinopril being added:  12/4 are 134/64 (last saw him on Monday afternoon)  12/3 are 132/71 and 143/70  12/2 are 120/63 and 123/63  12/1 are 110/60 and 127/66  11/30 are 124/61 and 151/71  11/29 are 151/95 and 124/66    Please let me know if you need anything else from me.    Yanick             The patient has been re-examined and I agree with the above assessment or I updated with my findings.

## 2023-12-12 DIAGNOSIS — R73.03 PREDIABETES: ICD-10-CM

## 2023-12-12 DIAGNOSIS — K29.50 UNSPECIFIED CHRONIC GASTRITIS WITHOUT BLEEDING: ICD-10-CM

## 2023-12-12 DIAGNOSIS — K31.89 OTHER DISEASES OF STOMACH AND DUODENUM: ICD-10-CM

## 2023-12-12 DIAGNOSIS — G47.30 SLEEP APNEA, UNSPECIFIED: ICD-10-CM

## 2023-12-12 DIAGNOSIS — K21.9 GASTRO-ESOPHAGEAL REFLUX DISEASE WITHOUT ESOPHAGITIS: ICD-10-CM

## 2023-12-12 DIAGNOSIS — E78.00 PURE HYPERCHOLESTEROLEMIA, UNSPECIFIED: ICD-10-CM

## 2023-12-12 DIAGNOSIS — I10 ESSENTIAL (PRIMARY) HYPERTENSION: ICD-10-CM

## 2023-12-12 DIAGNOSIS — Z79.82 LONG TERM (CURRENT) USE OF ASPIRIN: ICD-10-CM

## 2024-01-22 ENCOUNTER — APPOINTMENT (OUTPATIENT)
Dept: CARDIOLOGY | Facility: CLINIC | Age: 74
End: 2024-01-22

## 2024-01-29 ENCOUNTER — APPOINTMENT (OUTPATIENT)
Dept: PULMONOLOGY | Facility: CLINIC | Age: 74
End: 2024-01-29
Payer: MEDICARE

## 2024-01-29 VITALS
SYSTOLIC BLOOD PRESSURE: 140 MMHG | BODY MASS INDEX: 35.15 KG/M2 | OXYGEN SATURATION: 96 % | DIASTOLIC BLOOD PRESSURE: 65 MMHG | WEIGHT: 238 LBS | HEART RATE: 94 BPM

## 2024-01-29 DIAGNOSIS — G47.33 OBSTRUCTIVE SLEEP APNEA (ADULT) (PEDIATRIC): ICD-10-CM

## 2024-01-29 PROCEDURE — 99213 OFFICE O/P EST LOW 20 MIN: CPT

## 2024-01-29 PROCEDURE — G2211 COMPLEX E/M VISIT ADD ON: CPT

## 2024-01-29 NOTE — DISCUSSION/SUMMARY
[FreeTextEntry1] : Moderate ZAID on sleep study done in 2019 doing well with the pressure of 9 compliant and benefiting  WILL ORDER SUPPLIES/ NEED DIFFERENT VENDOR THAN [employer] OLD RECORD NOTED

## 2024-04-23 ENCOUNTER — OUTPATIENT (OUTPATIENT)
Dept: OUTPATIENT SERVICES | Facility: HOSPITAL | Age: 74
LOS: 1 days | Discharge: HOME | End: 2024-04-23
Payer: MEDICARE

## 2024-04-23 ENCOUNTER — TRANSCRIPTION ENCOUNTER (OUTPATIENT)
Age: 74
End: 2024-04-23

## 2024-04-23 VITALS
HEART RATE: 65 BPM | RESPIRATION RATE: 18 BRPM | SYSTOLIC BLOOD PRESSURE: 154 MMHG | TEMPERATURE: 97 F | HEIGHT: 69 IN | WEIGHT: 238.1 LBS | DIASTOLIC BLOOD PRESSURE: 90 MMHG

## 2024-04-23 VITALS
SYSTOLIC BLOOD PRESSURE: 136 MMHG | OXYGEN SATURATION: 96 % | HEART RATE: 61 BPM | RESPIRATION RATE: 18 BRPM | DIASTOLIC BLOOD PRESSURE: 71 MMHG

## 2024-04-23 DIAGNOSIS — H26.9 UNSPECIFIED CATARACT: Chronic | ICD-10-CM

## 2024-04-23 DIAGNOSIS — Z12.11 ENCOUNTER FOR SCREENING FOR MALIGNANT NEOPLASM OF COLON: ICD-10-CM

## 2024-04-23 PROCEDURE — 88305 TISSUE EXAM BY PATHOLOGIST: CPT

## 2024-04-23 NOTE — ASU DISCHARGE PLAN (ADULT/PEDIATRIC) - NS MD DC FALL RISK RISK
For information on Fall & Injury Prevention, visit: https://www.[facility]/news/fall-prevention-protects-and-maintains-health-and-mobility OR  https://www.[facility]/news/fall-prevention-tips-to-avoid-injury OR  https://www.cdc.gov/steadi/patient.html

## 2024-04-23 NOTE — ASU DISCHARGE PLAN (ADULT/PEDIATRIC) - OK TO LEAVE MESSAGE ON VOICEMAIL
[Fever] : no fever [Night Sweats] : no night sweats [Earache] : no earache [Nasal Discharge] : no nasal discharge [Chest Pain] : no chest pain [Palpitations] : no palpitations [Shortness Of Breath] : no shortness of breath Yes [Cough] : no cough [Nausea] : no nausea [Vomiting] : no vomiting

## 2024-04-23 NOTE — ASU DISCHARGE PLAN (ADULT/PEDIATRIC) - CARE PROVIDER_API CALL
Mariusz Flynn  Gastroenterology  [address], [address]  [place] [address]  Phone: [phone]  Fax: [phone]  Follow Up Time: 2 weeks

## 2024-04-23 NOTE — PRE-ANESTHESIA EVALUATION ADULT - NSANTHPMHFT_GEN_ALL_CORE
Chart reviewed, pt interviewed and examined.  PMH: As Above, ? Enlarged heart, no cardiology visit for a long period, Exercise tolerance good, no H/O CHF.

## 2024-04-23 NOTE — CHART NOTE - NSCHARTNOTEFT_GEN_A_CORE
PACU ANESTHESIA ADMISSION NOTE      Procedure: Colonoscopy  Post op diagnosis:      ____  Intubated  TV:______       Rate: ______      FiO2: ______    __x__  Patent Airway    ___x_  Full return of protective reflexes    __x__  Full recovery from anesthesia / back to baseline     Vitals:   T:  97         R:  14                BP:  98/53                Sat:  96%                 P: 69      Mental Status:  __x__ Awake   __x___ Alert   _____ Drowsy   _____ Sedated    Nausea/Vomiting:  _x___ NO  ______Yes,   See Post - Op Orders          Pain Scale (0-10):  _____    Treatment: ____ None    __x__ See Post - Op/PCA Orders    Post - Operative Fluids:   ____ Oral   __x__ See Post - Op Orders    Plan: Discharge:   __x__Home       _____Floor     _____Critical Care    _____  Other:_________________    Comments: patient hemodynamically stable. Handoff endorsed to PACU RN. No anesthesia related issues present. To be discharged home when criteria met

## 2024-04-24 LAB — SURGICAL PATHOLOGY STUDY: SIGNIFICANT CHANGE UP

## 2024-04-26 DIAGNOSIS — Z12.11 ENCOUNTER FOR SCREENING FOR MALIGNANT NEOPLASM OF COLON: ICD-10-CM

## 2024-04-26 DIAGNOSIS — N28.9 DISORDER OF KIDNEY AND URETER, UNSPECIFIED: ICD-10-CM

## 2024-04-26 DIAGNOSIS — K62.89 OTHER SPECIFIED DISEASES OF ANUS AND RECTUM: ICD-10-CM

## 2024-04-26 DIAGNOSIS — N40.0 BENIGN PROSTATIC HYPERPLASIA WITHOUT LOWER URINARY TRACT SYMPTOMS: ICD-10-CM

## 2024-04-26 DIAGNOSIS — M19.90 UNSPECIFIED OSTEOARTHRITIS, UNSPECIFIED SITE: ICD-10-CM

## 2024-04-26 DIAGNOSIS — K21.9 GASTRO-ESOPHAGEAL REFLUX DISEASE WITHOUT ESOPHAGITIS: ICD-10-CM

## 2024-04-26 DIAGNOSIS — I51.7 CARDIOMEGALY: ICD-10-CM

## 2024-04-26 DIAGNOSIS — M54.9 DORSALGIA, UNSPECIFIED: ICD-10-CM

## 2024-04-26 DIAGNOSIS — Z86.010 PERSONAL HISTORY OF COLONIC POLYPS: ICD-10-CM

## 2024-04-26 DIAGNOSIS — K52.9 NONINFECTIVE GASTROENTERITIS AND COLITIS, UNSPECIFIED: ICD-10-CM

## 2024-04-26 DIAGNOSIS — R73.03 PREDIABETES: ICD-10-CM

## 2024-04-26 DIAGNOSIS — E78.00 PURE HYPERCHOLESTEROLEMIA, UNSPECIFIED: ICD-10-CM

## 2024-04-26 DIAGNOSIS — K57.30 DIVERTICULOSIS OF LARGE INTESTINE WITHOUT PERFORATION OR ABSCESS WITHOUT BLEEDING: ICD-10-CM

## 2024-04-26 DIAGNOSIS — Z79.82 LONG TERM (CURRENT) USE OF ASPIRIN: ICD-10-CM

## 2024-04-26 DIAGNOSIS — G47.33 OBSTRUCTIVE SLEEP APNEA (ADULT) (PEDIATRIC): ICD-10-CM

## 2024-04-26 DIAGNOSIS — K63.5 POLYP OF COLON: ICD-10-CM

## 2024-04-26 DIAGNOSIS — I10 ESSENTIAL (PRIMARY) HYPERTENSION: ICD-10-CM

## 2024-04-26 DIAGNOSIS — D12.0 BENIGN NEOPLASM OF CECUM: ICD-10-CM

## 2024-04-26 DIAGNOSIS — K64.0 FIRST DEGREE HEMORRHOIDS: ICD-10-CM

## 2024-06-25 ENCOUNTER — OUTPATIENT (OUTPATIENT)
Dept: OUTPATIENT SERVICES | Facility: HOSPITAL | Age: 74
LOS: 1 days | End: 2024-06-25
Payer: MEDICARE

## 2024-06-25 VITALS
RESPIRATION RATE: 16 BRPM | SYSTOLIC BLOOD PRESSURE: 159 MMHG | HEART RATE: 88 BPM | TEMPERATURE: 98 F | HEIGHT: 69 IN | WEIGHT: 238.1 LBS | DIASTOLIC BLOOD PRESSURE: 87 MMHG | OXYGEN SATURATION: 98 %

## 2024-06-25 DIAGNOSIS — H26.9 UNSPECIFIED CATARACT: Chronic | ICD-10-CM

## 2024-06-25 DIAGNOSIS — M54.16 RADICULOPATHY, LUMBAR REGION: ICD-10-CM

## 2024-06-25 DIAGNOSIS — Z98.890 OTHER SPECIFIED POSTPROCEDURAL STATES: Chronic | ICD-10-CM

## 2024-06-25 DIAGNOSIS — Z00.8 ENCOUNTER FOR OTHER GENERAL EXAMINATION: ICD-10-CM

## 2024-06-25 PROCEDURE — 93005 ELECTROCARDIOGRAM TRACING: CPT

## 2024-06-25 PROCEDURE — 93010 ELECTROCARDIOGRAM REPORT: CPT

## 2024-06-25 PROCEDURE — 99214 OFFICE O/P EST MOD 30 MIN: CPT | Mod: 25

## 2024-06-25 RX ORDER — LABETALOL HCL 100 MG
1.5 TABLET ORAL
Refills: 0 | DISCHARGE

## 2024-06-25 RX ORDER — SILODOSIN 4 MG/1
1 CAPSULE ORAL
Qty: 0 | Refills: 0 | DISCHARGE

## 2024-06-25 RX ORDER — ROSUVASTATIN CALCIUM 20 MG/1
1 TABLET ORAL
Refills: 0 | DISCHARGE

## 2024-06-25 RX ORDER — VIBEGRON 75 MG/1
1 TABLET, FILM COATED ORAL
Refills: 0 | DISCHARGE

## 2024-06-25 RX ORDER — EPLERENONE 50 MG/1
1 TABLET, FILM COATED ORAL
Refills: 0 | DISCHARGE

## 2024-06-25 RX ORDER — OXYCODONE AND ACETAMINOPHEN 5; 325 MG/1; MG/1
0.5 TABLET ORAL
Refills: 0 | DISCHARGE

## 2024-06-25 RX ORDER — ASPIRIN/CALCIUM CARB/MAGNESIUM 324 MG
1 TABLET ORAL
Qty: 0 | Refills: 0 | DISCHARGE

## 2024-06-25 RX ORDER — NALOXEGOL OXALATE 25 MG/1
1 TABLET, FILM COATED ORAL
Refills: 0 | DISCHARGE

## 2024-06-25 RX ORDER — CALCITRIOL 0.25 UG/1
1 CAPSULE, LIQUID FILLED ORAL
Refills: 0 | DISCHARGE

## 2024-06-25 RX ORDER — PANTOPRAZOLE SODIUM 20 MG/1
1 TABLET, DELAYED RELEASE ORAL
Refills: 0 | DISCHARGE

## 2024-06-25 RX ORDER — LATANOPROST PF 0.05 MG/ML
1 SOLUTION/ DROPS OPHTHALMIC
Refills: 0 | DISCHARGE

## 2024-06-25 RX ORDER — LUBIPROSTONE 24 UG/1
1 CAPSULE, GELATIN COATED ORAL
Qty: 0 | Refills: 0 | DISCHARGE

## 2024-06-25 RX ORDER — TIMOLOL MALEATE 3.4 MG/ML
1 SOLUTION/ DROPS OPHTHALMIC
Refills: 0 | DISCHARGE

## 2024-06-25 RX ORDER — SIMVASTATIN 20 MG/1
1 TABLET, FILM COATED ORAL
Qty: 0 | Refills: 0 | DISCHARGE

## 2024-06-26 DIAGNOSIS — Z00.8 ENCOUNTER FOR OTHER GENERAL EXAMINATION: ICD-10-CM

## 2024-06-26 DIAGNOSIS — M54.16 RADICULOPATHY, LUMBAR REGION: ICD-10-CM

## 2024-06-28 PROBLEM — H40.9 UNSPECIFIED GLAUCOMA: Chronic | Status: ACTIVE | Noted: 2024-06-25

## 2024-06-28 PROBLEM — K52.9 NONINFECTIVE GASTROENTERITIS AND COLITIS, UNSPECIFIED: Chronic | Status: ACTIVE | Noted: 2024-06-25

## 2024-06-28 PROBLEM — C61 MALIGNANT NEOPLASM OF PROSTATE: Chronic | Status: ACTIVE | Noted: 2024-06-25

## 2024-06-28 PROBLEM — L80 VITILIGO: Chronic | Status: ACTIVE | Noted: 2024-06-25

## 2024-06-28 PROBLEM — N18.9 CHRONIC KIDNEY DISEASE, UNSPECIFIED: Chronic | Status: ACTIVE | Noted: 2024-06-25

## 2024-07-01 ENCOUNTER — RESULT CHARGE (OUTPATIENT)
Age: 74
End: 2024-07-01

## 2024-07-01 ENCOUNTER — APPOINTMENT (OUTPATIENT)
Dept: CARDIOLOGY | Facility: CLINIC | Age: 74
End: 2024-07-01
Payer: MEDICARE

## 2024-07-01 ENCOUNTER — NON-APPOINTMENT (OUTPATIENT)
Age: 74
End: 2024-07-01

## 2024-07-01 VITALS
HEIGHT: 69 IN | WEIGHT: 241 LBS | DIASTOLIC BLOOD PRESSURE: 80 MMHG | SYSTOLIC BLOOD PRESSURE: 130 MMHG | BODY MASS INDEX: 35.7 KG/M2

## 2024-07-01 DIAGNOSIS — I25.10 ATHEROSCLEROTIC HEART DISEASE OF NATIVE CORONARY ARTERY W/OUT ANGINA PECTORIS: ICD-10-CM

## 2024-07-01 DIAGNOSIS — I44.7 LEFT BUNDLE-BRANCH BLOCK, UNSPECIFIED: ICD-10-CM

## 2024-07-01 DIAGNOSIS — E78.5 HYPERLIPIDEMIA, UNSPECIFIED: ICD-10-CM

## 2024-07-01 PROCEDURE — G2211 COMPLEX E/M VISIT ADD ON: CPT

## 2024-07-01 PROCEDURE — 93000 ELECTROCARDIOGRAM COMPLETE: CPT

## 2024-07-01 PROCEDURE — 99214 OFFICE O/P EST MOD 30 MIN: CPT

## 2024-07-01 RX ORDER — TADALAFIL 5 MG/1
5 TABLET ORAL
Qty: 30 | Refills: 0 | Status: ACTIVE | COMMUNITY
Start: 2023-10-10

## 2024-07-01 RX ORDER — VIBEGRON 75 MG/1
75 TABLET, FILM COATED ORAL
Qty: 90 | Refills: 0 | Status: ACTIVE | COMMUNITY
Start: 2024-05-21

## 2024-07-01 RX ORDER — FAMOTIDINE 40 MG/1
40 TABLET, FILM COATED ORAL
Qty: 90 | Refills: 0 | Status: ACTIVE | COMMUNITY
Start: 2024-02-21

## 2024-07-01 RX ORDER — ROSUVASTATIN CALCIUM 20 MG/1
20 TABLET, FILM COATED ORAL DAILY
Qty: 30 | Refills: 6 | Status: ACTIVE | COMMUNITY
Start: 2024-07-01 | End: 1900-01-01

## 2024-07-01 RX ORDER — PREGABALIN 200 MG/1
200 CAPSULE ORAL
Qty: 60 | Refills: 0 | Status: ACTIVE | COMMUNITY
Start: 2024-06-28

## 2024-07-01 RX ORDER — ASPIRIN 81 MG/1
81 TABLET, COATED ORAL DAILY
Qty: 90 | Refills: 0 | Status: ACTIVE | COMMUNITY
Start: 2024-07-01 | End: 1900-01-01

## 2024-07-01 RX ORDER — EPLERENONE 50 MG/1
50 TABLET, COATED ORAL
Qty: 30 | Refills: 0 | Status: ACTIVE | COMMUNITY
Start: 2024-05-22

## 2024-07-01 RX ORDER — RIFAXIMIN 550 MG/1
550 TABLET ORAL
Qty: 42 | Refills: 0 | Status: ACTIVE | COMMUNITY
Start: 2024-04-17

## 2024-07-01 RX ORDER — NIFEDIPINE 60 MG/1
60 TABLET, EXTENDED RELEASE ORAL
Qty: 60 | Refills: 0 | Status: ACTIVE | COMMUNITY
Start: 2024-05-22

## 2024-07-01 RX ORDER — NALOXEGOL OXALATE 12.5 MG/1
12.5 TABLET, FILM COATED ORAL
Qty: 90 | Refills: 0 | Status: ACTIVE | COMMUNITY
Start: 2024-02-23

## 2024-08-05 ENCOUNTER — APPOINTMENT (OUTPATIENT)
Dept: PULMONOLOGY | Facility: CLINIC | Age: 74
End: 2024-08-05

## 2024-09-09 ENCOUNTER — OUTPATIENT (OUTPATIENT)
Dept: OUTPATIENT SERVICES | Facility: HOSPITAL | Age: 74
LOS: 1 days | Discharge: HOME | End: 2024-09-09
Payer: MEDICARE

## 2024-09-09 VITALS
WEIGHT: 240.08 LBS | HEART RATE: 75 BPM | RESPIRATION RATE: 17 BRPM | TEMPERATURE: 98 F | HEIGHT: 69 IN | DIASTOLIC BLOOD PRESSURE: 110 MMHG | OXYGEN SATURATION: 99 % | SYSTOLIC BLOOD PRESSURE: 185 MMHG

## 2024-09-09 VITALS — SYSTOLIC BLOOD PRESSURE: 166 MMHG | DIASTOLIC BLOOD PRESSURE: 112 MMHG | RESPIRATION RATE: 17 BRPM | HEART RATE: 75 BPM

## 2024-09-09 DIAGNOSIS — H25.12 AGE-RELATED NUCLEAR CATARACT, LEFT EYE: ICD-10-CM

## 2024-09-09 DIAGNOSIS — H26.9 UNSPECIFIED CATARACT: Chronic | ICD-10-CM

## 2024-09-09 DIAGNOSIS — Z98.890 OTHER SPECIFIED POSTPROCEDURAL STATES: Chronic | ICD-10-CM

## 2024-09-09 LAB — GLUCOSE BLDC GLUCOMTR-MCNC: 120 MG/DL — HIGH (ref 70–99)

## 2024-09-09 PROCEDURE — V2632: CPT

## 2024-09-09 PROCEDURE — 82962 GLUCOSE BLOOD TEST: CPT

## 2024-09-09 NOTE — ASU PATIENT PROFILE, ADULT - NSICDXPASTMEDICALHX_GEN_ALL_CORE_FT
PAST MEDICAL HISTORY:  Arthritis     Back pain     Borderline diabetes     Chronic kidney disease (CKD)     Enlarged heart     Enlarged prostate     GERD (gastroesophageal reflux disease)     Glaucoma     HTN (hypertension)     Hypercholesteremia     IBD (inflammatory bowel disease)     Prostate cancer     Renal disease     Sleep apnea     Vitiligo

## 2024-09-11 DIAGNOSIS — G47.33 OBSTRUCTIVE SLEEP APNEA (ADULT) (PEDIATRIC): ICD-10-CM

## 2024-09-11 DIAGNOSIS — I12.9 HYPERTENSIVE CHRONIC KIDNEY DISEASE WITH STAGE 1 THROUGH STAGE 4 CHRONIC KIDNEY DISEASE, OR UNSPECIFIED CHRONIC KIDNEY DISEASE: ICD-10-CM

## 2024-09-11 DIAGNOSIS — Z99.89 DEPENDENCE ON OTHER ENABLING MACHINES AND DEVICES: ICD-10-CM

## 2024-09-11 DIAGNOSIS — N18.9 CHRONIC KIDNEY DISEASE, UNSPECIFIED: ICD-10-CM

## 2024-09-11 DIAGNOSIS — K21.9 GASTRO-ESOPHAGEAL REFLUX DISEASE WITHOUT ESOPHAGITIS: ICD-10-CM

## 2024-09-11 DIAGNOSIS — Z85.46 PERSONAL HISTORY OF MALIGNANT NEOPLASM OF PROSTATE: ICD-10-CM

## 2024-09-11 DIAGNOSIS — Z87.891 PERSONAL HISTORY OF NICOTINE DEPENDENCE: ICD-10-CM

## 2024-09-11 DIAGNOSIS — M19.90 UNSPECIFIED OSTEOARTHRITIS, UNSPECIFIED SITE: ICD-10-CM

## 2025-01-21 ENCOUNTER — APPOINTMENT (OUTPATIENT)
Dept: PAIN MANAGEMENT | Facility: CLINIC | Age: 75
End: 2025-01-21

## 2025-04-16 ENCOUNTER — INPATIENT (INPATIENT)
Facility: HOSPITAL | Age: 75
LOS: 1 days | Discharge: HOME | DRG: 176 | End: 2025-04-18
Attending: HOSPITALIST | Admitting: STUDENT IN AN ORGANIZED HEALTH CARE EDUCATION/TRAINING PROGRAM
Payer: MEDICARE

## 2025-04-16 VITALS
RESPIRATION RATE: 18 BRPM | SYSTOLIC BLOOD PRESSURE: 160 MMHG | OXYGEN SATURATION: 99 % | DIASTOLIC BLOOD PRESSURE: 83 MMHG | WEIGHT: 240.08 LBS | TEMPERATURE: 98 F | HEART RATE: 78 BPM | HEIGHT: 70 IN

## 2025-04-16 DIAGNOSIS — H26.9 UNSPECIFIED CATARACT: Chronic | ICD-10-CM

## 2025-04-16 DIAGNOSIS — Z98.890 OTHER SPECIFIED POSTPROCEDURAL STATES: Chronic | ICD-10-CM

## 2025-04-16 PROCEDURE — 99053 MED SERV 10PM-8AM 24 HR FAC: CPT

## 2025-04-16 PROCEDURE — 99285 EMERGENCY DEPT VISIT HI MDM: CPT

## 2025-04-16 NOTE — ED ADULT TRIAGE NOTE - CHIEF COMPLAINT QUOTE
His blood pressure is high and he has blood in his urine - son   Patient reports abdominal pain, pelvic pain, denies anticoagulants, is on baby aspirin, denies burning urination

## 2025-04-17 ENCOUNTER — RESULT REVIEW (OUTPATIENT)
Age: 75
End: 2025-04-17

## 2025-04-17 DIAGNOSIS — I26.99 OTHER PULMONARY EMBOLISM WITHOUT ACUTE COR PULMONALE: ICD-10-CM

## 2025-04-17 DIAGNOSIS — R07.9 CHEST PAIN, UNSPECIFIED: ICD-10-CM

## 2025-04-17 DIAGNOSIS — R09.89 OTHER SPECIFIED SYMPTOMS AND SIGNS INVOLVING THE CIRCULATORY AND RESPIRATORY SYSTEMS: ICD-10-CM

## 2025-04-17 LAB
ALBUMIN SERPL ELPH-MCNC: 4.3 G/DL — SIGNIFICANT CHANGE UP (ref 3.5–5.2)
ALP SERPL-CCNC: 52 U/L — SIGNIFICANT CHANGE UP (ref 30–115)
ALT FLD-CCNC: 18 U/L — SIGNIFICANT CHANGE UP (ref 0–41)
ANION GAP SERPL CALC-SCNC: 10 MMOL/L — SIGNIFICANT CHANGE UP (ref 7–14)
APPEARANCE UR: CLEAR — SIGNIFICANT CHANGE UP
APTT BLD: 29.8 SEC — SIGNIFICANT CHANGE UP (ref 27–39.2)
APTT BLD: 39.2 SEC — SIGNIFICANT CHANGE UP (ref 27–39.2)
APTT BLD: >200 SEC — CRITICAL HIGH (ref 27–39.2)
AST SERPL-CCNC: 35 U/L — SIGNIFICANT CHANGE UP (ref 0–41)
BACTERIA # UR AUTO: NEGATIVE /HPF — SIGNIFICANT CHANGE UP
BASE EXCESS BLDV CALC-SCNC: 2.2 MMOL/L — SIGNIFICANT CHANGE UP (ref -2–3)
BASOPHILS # BLD AUTO: 0.04 K/UL — SIGNIFICANT CHANGE UP (ref 0–0.2)
BASOPHILS NFR BLD AUTO: 0.8 % — SIGNIFICANT CHANGE UP (ref 0–1)
BILIRUB DIRECT SERPL-MCNC: <0.2 MG/DL — SIGNIFICANT CHANGE UP (ref 0–0.3)
BILIRUB INDIRECT FLD-MCNC: >0.4 MG/DL — SIGNIFICANT CHANGE UP (ref 0.2–1.2)
BILIRUB SERPL-MCNC: 0.6 MG/DL — SIGNIFICANT CHANGE UP (ref 0.2–1.2)
BILIRUB UR-MCNC: NEGATIVE — SIGNIFICANT CHANGE UP
BUN SERPL-MCNC: 18 MG/DL — SIGNIFICANT CHANGE UP (ref 10–20)
CALCIUM SERPL-MCNC: 9.1 MG/DL — SIGNIFICANT CHANGE UP (ref 8.4–10.5)
CAST: 0 /LPF — SIGNIFICANT CHANGE UP (ref 0–4)
CHLORIDE SERPL-SCNC: 105 MMOL/L — SIGNIFICANT CHANGE UP (ref 98–110)
CK SERPL-CCNC: 388 U/L — HIGH (ref 0–225)
CO2 SERPL-SCNC: 24 MMOL/L — SIGNIFICANT CHANGE UP (ref 17–32)
COLOR SPEC: YELLOW — SIGNIFICANT CHANGE UP
CREAT SERPL-MCNC: 1.4 MG/DL — SIGNIFICANT CHANGE UP (ref 0.7–1.5)
D DIMER BLD IA.RAPID-MCNC: 717 NG/ML DDU — HIGH
DIFF PNL FLD: ABNORMAL
EGFR: 52 ML/MIN/1.73M2 — LOW
EGFR: 52 ML/MIN/1.73M2 — LOW
EOSINOPHIL # BLD AUTO: 0.17 K/UL — SIGNIFICANT CHANGE UP (ref 0–0.7)
EOSINOPHIL NFR BLD AUTO: 3.3 % — SIGNIFICANT CHANGE UP (ref 0–8)
GAS PNL BLDV: 139 MMOL/L — SIGNIFICANT CHANGE UP (ref 136–145)
GAS PNL BLDV: SIGNIFICANT CHANGE UP
GLUCOSE SERPL-MCNC: 123 MG/DL — HIGH (ref 70–99)
GLUCOSE UR QL: NEGATIVE MG/DL — SIGNIFICANT CHANGE UP
HCO3 BLDV-SCNC: 29 MMOL/L — SIGNIFICANT CHANGE UP (ref 22–29)
HCT VFR BLD CALC: 39.3 % — LOW (ref 42–52)
HGB BLD-MCNC: 12.8 G/DL — LOW (ref 14–18)
IMM GRANULOCYTES NFR BLD AUTO: 0.2 % — SIGNIFICANT CHANGE UP (ref 0.1–0.3)
INR BLD: 0.96 RATIO — SIGNIFICANT CHANGE UP (ref 0.65–1.3)
KETONES UR-MCNC: NEGATIVE MG/DL — SIGNIFICANT CHANGE UP
LACTATE BLDV-MCNC: 1.2 MMOL/L — SIGNIFICANT CHANGE UP (ref 0.5–2)
LEUKOCYTE ESTERASE UR-ACNC: NEGATIVE — SIGNIFICANT CHANGE UP
LIDOCAIN IGE QN: 128 U/L — HIGH (ref 7–60)
LYMPHOCYTES # BLD AUTO: 2.06 K/UL — SIGNIFICANT CHANGE UP (ref 1.2–3.4)
LYMPHOCYTES # BLD AUTO: 39.5 % — SIGNIFICANT CHANGE UP (ref 20.5–51.1)
MCHC RBC-ENTMCNC: 29.2 PG — SIGNIFICANT CHANGE UP (ref 27–31)
MCHC RBC-ENTMCNC: 32.6 G/DL — SIGNIFICANT CHANGE UP (ref 32–37)
MCV RBC AUTO: 89.7 FL — SIGNIFICANT CHANGE UP (ref 80–94)
MONOCYTES # BLD AUTO: 0.66 K/UL — HIGH (ref 0.1–0.6)
MONOCYTES NFR BLD AUTO: 12.6 % — HIGH (ref 1.7–9.3)
NEUTROPHILS # BLD AUTO: 2.28 K/UL — SIGNIFICANT CHANGE UP (ref 1.4–6.5)
NEUTROPHILS NFR BLD AUTO: 43.6 % — SIGNIFICANT CHANGE UP (ref 42.2–75.2)
NITRITE UR-MCNC: NEGATIVE — SIGNIFICANT CHANGE UP
NRBC BLD AUTO-RTO: 0 /100 WBCS — SIGNIFICANT CHANGE UP (ref 0–0)
NT-PROBNP SERPL-SCNC: 48 PG/ML — SIGNIFICANT CHANGE UP (ref 0–300)
PCO2 BLDV: 54 MMHG — SIGNIFICANT CHANGE UP (ref 42–55)
PH BLDV: 7.34 — SIGNIFICANT CHANGE UP (ref 7.32–7.43)
PH UR: 7 — SIGNIFICANT CHANGE UP (ref 5–8)
PLATELET # BLD AUTO: 144 K/UL — SIGNIFICANT CHANGE UP (ref 130–400)
PMV BLD: 11.1 FL — HIGH (ref 7.4–10.4)
PO2 BLDV: 32 MMHG — SIGNIFICANT CHANGE UP (ref 25–45)
POTASSIUM BLDV-SCNC: 4.4 MMOL/L — SIGNIFICANT CHANGE UP (ref 3.5–5.1)
POTASSIUM SERPL-MCNC: 5 MMOL/L — SIGNIFICANT CHANGE UP (ref 3.5–5)
POTASSIUM SERPL-SCNC: 5 MMOL/L — SIGNIFICANT CHANGE UP (ref 3.5–5)
PROT SERPL-MCNC: 7.6 G/DL — SIGNIFICANT CHANGE UP (ref 6–8)
PROT UR-MCNC: NEGATIVE MG/DL — SIGNIFICANT CHANGE UP
PROTHROM AB SERPL-ACNC: 11.3 SEC — SIGNIFICANT CHANGE UP (ref 9.95–12.87)
RBC # BLD: 4.38 M/UL — LOW (ref 4.7–6.1)
RBC # FLD: 11.9 % — SIGNIFICANT CHANGE UP (ref 11.5–14.5)
RBC CASTS # UR COMP ASSIST: 152 /HPF — HIGH (ref 0–4)
SAO2 % BLDV: 56.5 % — LOW (ref 67–88)
SODIUM SERPL-SCNC: 139 MMOL/L — SIGNIFICANT CHANGE UP (ref 135–146)
SP GR SPEC: 1.01 — SIGNIFICANT CHANGE UP (ref 1–1.03)
SQUAMOUS # UR AUTO: 0 /HPF — SIGNIFICANT CHANGE UP (ref 0–5)
TROPONIN T, HIGH SENSITIVITY RESULT: 11 NG/L — SIGNIFICANT CHANGE UP (ref 6–21)
TROPONIN T, HIGH SENSITIVITY RESULT: 9 NG/L — SIGNIFICANT CHANGE UP (ref 6–21)
UROBILINOGEN FLD QL: 0.2 MG/DL — SIGNIFICANT CHANGE UP (ref 0.2–1)
WBC # BLD: 5.22 K/UL — SIGNIFICANT CHANGE UP (ref 4.8–10.8)
WBC # FLD AUTO: 5.22 K/UL — SIGNIFICANT CHANGE UP (ref 4.8–10.8)
WBC UR QL: 0 /HPF — SIGNIFICANT CHANGE UP (ref 0–5)

## 2025-04-17 PROCEDURE — 86850 RBC ANTIBODY SCREEN: CPT

## 2025-04-17 PROCEDURE — 83036 HEMOGLOBIN GLYCOSYLATED A1C: CPT

## 2025-04-17 PROCEDURE — 85025 COMPLETE CBC W/AUTO DIFF WBC: CPT

## 2025-04-17 PROCEDURE — 97161 PT EVAL LOW COMPLEX 20 MIN: CPT | Mod: GP

## 2025-04-17 PROCEDURE — 83880 ASSAY OF NATRIURETIC PEPTIDE: CPT

## 2025-04-17 PROCEDURE — 80061 LIPID PANEL: CPT

## 2025-04-17 PROCEDURE — 80053 COMPREHEN METABOLIC PANEL: CPT

## 2025-04-17 PROCEDURE — 86901 BLOOD TYPING SEROLOGIC RH(D): CPT

## 2025-04-17 PROCEDURE — 85730 THROMBOPLASTIN TIME PARTIAL: CPT

## 2025-04-17 PROCEDURE — 36415 COLL VENOUS BLD VENIPUNCTURE: CPT

## 2025-04-17 PROCEDURE — 85027 COMPLETE CBC AUTOMATED: CPT

## 2025-04-17 PROCEDURE — 93970 EXTREMITY STUDY: CPT | Mod: 26

## 2025-04-17 PROCEDURE — 70450 CT HEAD/BRAIN W/O DYE: CPT | Mod: 26

## 2025-04-17 PROCEDURE — 84484 ASSAY OF TROPONIN QUANT: CPT

## 2025-04-17 PROCEDURE — 71275 CT ANGIOGRAPHY CHEST: CPT | Mod: 26

## 2025-04-17 PROCEDURE — 93306 TTE W/DOPPLER COMPLETE: CPT | Mod: 26

## 2025-04-17 PROCEDURE — 99223 1ST HOSP IP/OBS HIGH 75: CPT

## 2025-04-17 PROCEDURE — 93010 ELECTROCARDIOGRAM REPORT: CPT

## 2025-04-17 PROCEDURE — 93307 TTE W/O DOPPLER COMPLETE: CPT

## 2025-04-17 PROCEDURE — 82962 GLUCOSE BLOOD TEST: CPT

## 2025-04-17 PROCEDURE — 86900 BLOOD TYPING SEROLOGIC ABO: CPT

## 2025-04-17 PROCEDURE — 74177 CT ABD & PELVIS W/CONTRAST: CPT | Mod: 26

## 2025-04-17 PROCEDURE — 93970 EXTREMITY STUDY: CPT

## 2025-04-17 RX ORDER — PREGABALIN 75 MG/1
200 CAPSULE ORAL
Refills: 0 | Status: DISCONTINUED | OUTPATIENT
Start: 2025-04-17 | End: 2025-04-18

## 2025-04-17 RX ORDER — APIXABAN 2.5 MG/1
1 TABLET, FILM COATED ORAL
Qty: 60 | Refills: 0
Start: 2025-04-17 | End: 2025-05-16

## 2025-04-17 RX ORDER — LABETALOL HYDROCHLORIDE 200 MG/1
300 TABLET, FILM COATED ORAL
Refills: 0 | Status: DISCONTINUED | OUTPATIENT
Start: 2025-04-17 | End: 2025-04-18

## 2025-04-17 RX ORDER — HEPARIN SODIUM 1000 [USP'U]/ML
4000 INJECTION INTRAVENOUS; SUBCUTANEOUS EVERY 6 HOURS
Refills: 0 | Status: DISCONTINUED | OUTPATIENT
Start: 2025-04-17 | End: 2025-04-18

## 2025-04-17 RX ORDER — CALCITRIOL 0.5 UG/1
0.5 CAPSULE, GELATIN COATED ORAL DAILY
Refills: 0 | Status: DISCONTINUED | OUTPATIENT
Start: 2025-04-17 | End: 2025-04-18

## 2025-04-17 RX ORDER — MAGNESIUM, ALUMINUM HYDROXIDE 200-200 MG
30 TABLET,CHEWABLE ORAL EVERY 4 HOURS
Refills: 0 | Status: DISCONTINUED | OUTPATIENT
Start: 2025-04-17 | End: 2025-04-18

## 2025-04-17 RX ORDER — NIFEDIPINE 30 MG
60 TABLET, EXTENDED RELEASE 24 HR ORAL AT BEDTIME
Refills: 0 | Status: DISCONTINUED | OUTPATIENT
Start: 2025-04-17 | End: 2025-04-18

## 2025-04-17 RX ORDER — HEPARIN SODIUM 1000 [USP'U]/ML
9000 INJECTION INTRAVENOUS; SUBCUTANEOUS EVERY 6 HOURS
Refills: 0 | Status: DISCONTINUED | OUTPATIENT
Start: 2025-04-17 | End: 2025-04-18

## 2025-04-17 RX ORDER — ENOXAPARIN SODIUM 100 MG/ML
100 INJECTION SUBCUTANEOUS ONCE
Refills: 0 | Status: COMPLETED | OUTPATIENT
Start: 2025-04-17 | End: 2025-04-17

## 2025-04-17 RX ORDER — MELATONIN 5 MG
3 TABLET ORAL AT BEDTIME
Refills: 0 | Status: DISCONTINUED | OUTPATIENT
Start: 2025-04-17 | End: 2025-04-18

## 2025-04-17 RX ORDER — HEPARIN SODIUM 1000 [USP'U]/ML
9000 INJECTION INTRAVENOUS; SUBCUTANEOUS ONCE
Refills: 0 | Status: COMPLETED | OUTPATIENT
Start: 2025-04-17 | End: 2025-04-17

## 2025-04-17 RX ORDER — LATANOPROST PF 0.05 MG/ML
1 SOLUTION/ DROPS OPHTHALMIC AT BEDTIME
Refills: 0 | Status: DISCONTINUED | OUTPATIENT
Start: 2025-04-17 | End: 2025-04-18

## 2025-04-17 RX ORDER — ROSUVASTATIN CALCIUM 5 MG/1
1 TABLET, FILM COATED ORAL
Refills: 0 | DISCHARGE

## 2025-04-17 RX ORDER — ROSUVASTATIN CALCIUM 5 MG/1
20 TABLET, FILM COATED ORAL AT BEDTIME
Refills: 0 | Status: DISCONTINUED | OUTPATIENT
Start: 2025-04-17 | End: 2025-04-18

## 2025-04-17 RX ORDER — APIXABAN 2.5 MG/1
1 TABLET, FILM COATED ORAL
Qty: 148 | Refills: 0
Start: 2025-04-17 | End: 2025-06-29

## 2025-04-17 RX ORDER — ACETAMINOPHEN 500 MG/5ML
650 LIQUID (ML) ORAL EVERY 6 HOURS
Refills: 0 | Status: DISCONTINUED | OUTPATIENT
Start: 2025-04-17 | End: 2025-04-18

## 2025-04-17 RX ORDER — TIRZEPATIDE 7.5 MG/.5ML
5 INJECTION, SOLUTION SUBCUTANEOUS
Refills: 0 | DISCHARGE

## 2025-04-17 RX ORDER — TAMSULOSIN HYDROCHLORIDE 0.4 MG/1
0.4 CAPSULE ORAL AT BEDTIME
Refills: 0 | Status: DISCONTINUED | OUTPATIENT
Start: 2025-04-17 | End: 2025-04-18

## 2025-04-17 RX ORDER — SILODOSIN 4 MG/1
1 CAPSULE ORAL
Refills: 0 | DISCHARGE

## 2025-04-17 RX ORDER — HEPARIN SODIUM 1000 [USP'U]/ML
INJECTION INTRAVENOUS; SUBCUTANEOUS
Qty: 25000 | Refills: 0 | Status: DISCONTINUED | OUTPATIENT
Start: 2025-04-17 | End: 2025-04-18

## 2025-04-17 RX ORDER — ONDANSETRON HCL/PF 4 MG/2 ML
4 VIAL (ML) INJECTION EVERY 8 HOURS
Refills: 0 | Status: DISCONTINUED | OUTPATIENT
Start: 2025-04-17 | End: 2025-04-18

## 2025-04-17 RX ADMIN — HEPARIN SODIUM 9000 UNIT(S): 1000 INJECTION INTRAVENOUS; SUBCUTANEOUS at 12:34

## 2025-04-17 RX ADMIN — LABETALOL HYDROCHLORIDE 300 MILLIGRAM(S): 200 TABLET, FILM COATED ORAL at 17:55

## 2025-04-17 RX ADMIN — TAMSULOSIN HYDROCHLORIDE 0.4 MILLIGRAM(S): 0.4 CAPSULE ORAL at 21:08

## 2025-04-17 RX ADMIN — HEPARIN SODIUM 1900 UNIT(S)/HR: 1000 INJECTION INTRAVENOUS; SUBCUTANEOUS at 12:39

## 2025-04-17 RX ADMIN — ENOXAPARIN SODIUM 100 MILLIGRAM(S): 100 INJECTION SUBCUTANEOUS at 05:18

## 2025-04-17 RX ADMIN — HEPARIN SODIUM 1600 UNIT(S)/HR: 1000 INJECTION INTRAVENOUS; SUBCUTANEOUS at 21:28

## 2025-04-17 RX ADMIN — ROSUVASTATIN CALCIUM 20 MILLIGRAM(S): 5 TABLET, FILM COATED ORAL at 21:08

## 2025-04-17 RX ADMIN — HEPARIN SODIUM 0 UNIT(S)/HR: 1000 INJECTION INTRAVENOUS; SUBCUTANEOUS at 20:12

## 2025-04-17 RX ADMIN — CALCITRIOL 0.5 MICROGRAM(S): 0.5 CAPSULE, GELATIN COATED ORAL at 12:33

## 2025-04-17 RX ADMIN — Medication 20 MILLIGRAM(S): at 21:08

## 2025-04-17 RX ADMIN — Medication 60 MILLIGRAM(S): at 14:45

## 2025-04-17 RX ADMIN — LATANOPROST PF 1 DROP(S): 0.05 SOLUTION/ DROPS OPHTHALMIC at 21:08

## 2025-04-17 RX ADMIN — PREGABALIN 200 MILLIGRAM(S): 75 CAPSULE ORAL at 17:56

## 2025-04-17 NOTE — PATIENT PROFILE ADULT - SURGICAL SITE INCISION
" I went to the toilet this morning  - when I got up , I felt this rolling pain on my right side waist area " PMH Herniated disc lower back Pt took Celebrex and Extra- Strengths Tylenol prior to arrival
no

## 2025-04-17 NOTE — H&P ADULT - ASSESSMENT
The patient is a 76 yo M with PMHx of presenting with dizziness, chest pain and hematuria, found to have an acute subsegmental PE.     # Chest pain   # LLL, acute subsegmental PE, low-risk PE  # Recent travel/immbilization, hx of cancer, D Dimer elevation  - VSS: afebrile, saturating 100% on room air, HD stable  - D dimer: 729  - EKG  - Pending: Echo, Pro-BNP  - AC: Lovenox  -  The patient is a 76 yo M with PMHx of presenting with dizziness, chest pain and hematuria, found to have an acute subsegmental PE.     # Chest pain   # LLL, acute subsegmental PE, low-risk PE  # Recent travel/immbilization, hx of cancer, D Dimer elevation  - VSS: afebrile, saturating 100% on room air, HD stable  - D dimer: 729  - EKG  - Pending: Echo, Pro-BNP  - AC: Heparin drip  - PESI score:   - Hem onc consult, recs pending  - monitor resp status, O2 as needed    #Hematuria, NEW  # Hx of prostate CA, 2021  - denies any previous hx prior to ED arrival  - occurred on admission  - otherwise asymptomatic, no dysuria/urgency/frequency/dribbling  - UA: mod RBCs,  152 RBCs, otherwise negative  - Pending: Urine cx  - Will initiate anticoagulation with heparin due to more risks vs benefits from current acute PE    #CKD, stage 3a  - Creat 1.4, GFR 52 on admission (baseline)  - Monitor creatinine  - Avoid ACE/ARBs      #Maintenance  Diet: Regular Dash/Carb consistent  DVT PPX: Heparin drip for PE  GI ppx:: none indicated  Dispo: Acute, tele monitoring    Penidng: Hem onc consult c/w heparin drip, Echo, Pro-bnp, Pt/Ot eval, monitor HD and resp status   The patient is a 76 yo M with PMHx of HTN, HLD, CKD stage 3a, Pre-DM, Hx of prostate Ca (2020, s/p RA)  presenting with dizziness, chest pain and hematuria, found to have an acute subsegmental PE.     # Chest pain   # LLL, acute subsegmental PE, low-risk PE  # Recent travel/immbilization, hx of cancer, D Dimer elevation  - VSS: afebrile, saturating 100% on room air, HD stable  - D dimer: 729  - EKG:   - Pending: Echo, Pro-BNP  - AC: Heparin drip  - PESI score: 117, Class IV, 4-11% of 30 day mortality  - Hem onc consult, recs pending  - monitor resp status, O2 as needed    #Hematuria, NEW  # Hx of prostate CA, 2021  # BPH? -   - otherwise asymptomatic, no dysuria/urgency/frequency/dribbling.  - UA: mod RBCs,  152 RBCs, otherwise negative, Pending: Urine cx  - Will initiate anticoagulation with heparin due to more risks vs benefits from current acute PE, monitor urine for blood/clots  - C.w Tamsulosin (takes Silodosin at home)    # CKD, stage 3a  # CKD likely 2.2 Diabetic nephropathy  - Creat 1.4, GFR 52 on admission (baseline)  - Monitor creatinine  - Avoid ACE/ARBs  - Follows with Dr. Maynard OP, currently on calcitriol 0.5 mcg QD, Eplerenone 50 mg QD    #Pre-DM  - Takes Mounjaro as OP  - A1C/ Lipid panel    #HTN  - monitor BP as needed given PE   - C.w labetolol, nifedipine, epleronone for now    # HLD  - C/w Rosuvastatin    # GERD  - c/w famotidine    #Glaucoma  #B/L cataracts, previously diagnosed  - c/w eye drops- Latanoprost    #Maintenance  Diet: Regular Dash/Carb consistent  DVT PPX: Heparin drip for PE  GI ppx:: none indicated  Dispo: Acute, tele monitoring    Penidng: Hem onc consult c/w heparin drip, Echo, Pro-bnp, PTOT eval, monitor HD and resp status   The patient is a 74 yo M with PMHx of HTN, HLD, CKD stage 3a, Pre-DM, Hx of prostate Ca (2020, s/p RA)  presenting with dizziness, chest pain and hematuria, found to have an acute subsegmental PE.     # Chest pain   # LLL, acute subsegmental PE, low-risk PE  # Recent travel/immbilization, hx of cancer, D Dimer elevation  - VSS: afebrile, saturating 100% on room air, HD stable  - D dimer: 729  - EKG:   - Pending: Echo, Pro-BNP  - AC: Heparin drip  - PESI score: 117, Class IV, 4-11% of 30 day mortality  - Hem onc consult, recs pending  - monitor resp status, O2 as needed    #Hematuria, NEW  # Hx of prostate CA, 2021  # BPH? -   - otherwise asymptomatic, no dysuria/urgency/frequency/dribbling.  - UA: mod RBCs,  152 RBCs, otherwise negative, Pending: Urine cx  - Will initiate anticoagulation with heparin due to more risks vs benefits from current acute PE, monitor urine for blood/clots  - C.w Tamsulosin (takes Silodosin at home)    # CKD, stage 3a  # CKD likely 2.2 Diabetic nephropathy  - Creat 1.4, GFR 52 on admission (baseline)  - Monitor creatinine  - Avoid ACE/ARBs  - Follows with Dr. Maynard OP, currently on calcitriol 0.5 mcg QD, Eplerenone 50 mg QD    #Pre-DM  - Takes Mounjaro as OP  - A1C/ Lipid panel  - POCT Glucose  - -160  - ISS    #HTN  - monitor BP as needed given PE   - C.w labetolol, nifedipine, epleronone for now    # HLD  - C/w Rosuvastatin    # GERD  - c/w famotidine    #Glaucoma  #B/L cataracts, previously diagnosed  - c/w eye drops- Latanoprost    #Maintenance  Diet: Regular Dash/Carb consistent  DVT PPX: Heparin drip for PE  GI ppx: none indicated  Dispo: Acute, tele monitoring    Penidng: Hem onc consult c/w heparin drip, Echo, Pro-bnp, PTOT eval, monitor HD and resp status

## 2025-04-17 NOTE — PHYSICAL THERAPY INITIAL EVALUATION ADULT - REHAB POTENTIAL, PT EVAL
Pt. is currently functioning independently at his [facility] and does not require acute PT services at this time. Pt. encouraged to ambulate on the unit ad marques./none

## 2025-04-17 NOTE — ED PROVIDER NOTE - EKG/XRAY ADDITIONAL INFORMATION
EKG reviewed and interpreted by me. EKG shows Sinus rhythm, intervals are in acceptable range, and no significant ST/T wave changes noted.

## 2025-04-17 NOTE — H&P ADULT - NSHPREVIEWOFSYSTEMS_GEN_ALL_CORE
REVIEW OF SYSTEMS:    CONSTITUTIONAL:  +dizziness. No weakness, fevers or chills  EYES/ENT:  No visual changes;  No vertigo or throat pain   NECK:  No pain or stiffness  RESPIRATORY:  No cough, wheezing, hemoptysis; No shortness of breath  CARDIOVASCULAR:  + chest pain, No palpitations  GASTROINTESTINAL:  No abdominal or epigastric pain. No nausea, vomiting, or hematemesis; No diarrhea or constipation. No melena or hematochezia.  GENITOURINARY:  + hematuria. No dysuria, frequency.   NEUROLOGICAL:  No numbness or weakness  SKIN:  No itching, rashes

## 2025-04-17 NOTE — H&P ADULT - NSHPPHYSICALEXAM_GEN_ALL_CORE
GENERAL: NAD, lying in bed comfortably  HEAD:  Atraumatic, normocephalic  EYES: EOMI, PERRL  NECK: Supple, trachea midline, no JVD  HEART: Regular rate and rhythm  LUNGS: Unlabored respirations.  Clear to auscultation bilaterally, no crackles, wheezing, or rhonchi  ABDOMEN: Soft, nontender, nondistended, +BS  EXTREMITIES: 2+ peripheral pulses bilaterally. No clubbing, cyanosis, or edema  NERVOUS SYSTEM:  A&Ox3, moving all extremities, no focal deficits GENERAL: NAD, lying in bed comfortably   HEAD:  Atraumatic, normocephalic  EYES: EOMI, PERRL  NECK: Supple, trachea midline, no JVD   HEART: Regular rate and rhythm,   LUNGS: Unlabored respirations.  Clear to auscultation bilaterally, no crackles, wheezing, or rhonchi +decreased BS on the R  ABDOMEN: Soft, nontender, + BS +distended abdomen +dullness to percussion on the L  EXTREMITIES: 2+ peripheral pulses bilaterally. No clubbing, cyanosis, or edema  NERVOUS SYSTEM:  A&Ox3, moving all extremities, no focal deficits  SKIN: patchy area of hypopigmentation on the trunk, b/l LE, abdomen GENERAL: NAD, lying in bed comfortably   HEAD:  Atraumatic, normocephalic  EYES: EOMI, PERRL  NECK: Supple, trachea midline, no JVD   HEART: Regular rate and rhythm,   LUNGS: Unlabored respirations. Clear to auscultation bilaterally, no crackles, wheezing, or rhonchi +decreased BS on the R lung base   ABDOMEN: Soft, nontender, + BS +distended abdomen +dullness to percussion on the L  EXTREMITIES: 2+ peripheral pulses bilaterally. No clubbing, cyanosis, or edema  NERVOUS SYSTEM:  A&Ox3, moving all extremities, no focal deficits  SKIN: + diffuse patchy areas of hypopigmentation on the trunk, b/l LE, abdomen

## 2025-04-17 NOTE — ED PROVIDER NOTE - DIFFERENTIAL DIAGNOSIS
Pancreatitis, hepatic failure, obstructive uropathy, diverticulitis, colitis, abscess, bowel obstruction, bowel perforation. Electrolyte abnormalities, SOILA, and GI bleeding.  r/o ACS; r/o PE; r/o ICH. Differential Diagnosis

## 2025-04-17 NOTE — H&P ADULT - ATTENDING COMMENTS
Present with the resident during the interview and examination of the patient by me. I personally interviewed the patient and repeated the exam. I reviewed/revised the exam and discussed with the resident in regards to assessment and plan as documented. See resident's section for details and agree with the above documented note.    S-Patient states that he feels well currently. denies any complaints.     O-PEx: Cardiac exam is regular rate and rhythm, lungs are clear to auscultation.      I have utilized all available resources to obtain, update, or review the patients current medications.     Results of imaging, labs and EKG reviewed independently    A&P    # Atypical Chest pain   # Secondary to Pulmonary Embolism LLL, acute subsegmental PE, low-risk PE  # Recent travel/immbilization, hx of cancer, D Dimer elevation  - VSS: afebrile, saturating 100% on room air, HD stable  - D dimer: 729  - EKG  - Pending: Echo, Pro-BNP  - AC: Heparin drip  - PESI score:   - Hem onc consult, recs pending  - monitor resp status, O2 as needed    #Hematuria, NEW  # Hx of prostate CA, 2021  - denies any previous hx prior to ED arrival  - occurred on admission  - otherwise asymptomatic, no dysuria/urgency/frequency/dribbling  - UA: mod RBCs,  152 RBCs, otherwise negative  - Pending: Urine cx  - Will initiate anticoagulation with heparin due to more risks vs benefits from current acute PE    #CKD, stage 3a  - Creat 1.4, GFR 52 on admission (baseline)  - Monitor creatinine  - Avoid ACE/ARBs    #Maintenance  Diet: Regular Dash/Carb consistent  DVT PPX: Heparin drip for PE  GI ppx:: none indicated  Dispo: Acute, tele monitoring    Pending: Hem onc consult c/w heparin drip, Echo, Pro-bnp, Pt/Ot eval, monitor HD and resp status    Disposition and Medical Necessity :   -1-2 MN on Heparin Drip pending Hem Onc recs. and ECHO     I have seen and examined the patient today and I spend time with the patient half of the time face-to-face encounter, I examine the patient, reviewed medications, I have reviewed laboratories, and imaging, and discussed plan of care with nurses staff. Please excuse any dictation or typographical errors that have not been edited out

## 2025-04-17 NOTE — ED PROVIDER NOTE - OBJECTIVE STATEMENT
Patient presents to ED c/o lower abd pain x one week, intermittent episodes of chest pain/sob/dizziness. Patient returned from [facility] on March 23rd. Denies f/c/n/v. Patient with h/o prostate CA and had received radiation therapy in 2021. Patient started having hematuria today, which continued, so had come to ED for evaluation. Patient stated that, while in ED, after using bathroom, something came out in the urine, not sure, what it was, but he did not collect it and had flushed the toilet. Patient denies neck pain/back pain. Denies any other neurologic symptoms.

## 2025-04-17 NOTE — PHYSICAL THERAPY INITIAL EVALUATION ADULT - PERTINENT HX OF CURRENT PROBLEM, REHAB EVAL
76 yo M with a PMHX of HTN, HLD, CKD Stage 3a, Hx of prostate CA (s/p radiation therapy in 2021), Pre-diabetes, BPH? who presented to the ED with hematuria, chest pain for the past 2 days. Patient states hematuria was been chronic for 2 years but progressively worsened over the past 2 days, urine is blood tinged but without clots/ denies any other urinary sx- dysuria/hesitancy/urgency/dribbling. Patient measured his BP at home which was 160/80s- with these two sx,  was concerned and presented to the ED for further evaluation. On ROS, patient reported b/l chest wall pain, worse in inspiration radiating to his mid back associated with lightheadedness and SOB. Patient reports recent travel to [place], for a 10 day trip, where he did not experience this chest pain and was in his normal state of health. Patient denies any HARDEN, LE edema, palpitations, orthopnea, PND. Denies fever/chills, nausea, vomiting diarrhea, constipation. Denies sick contacts, recent illnesses.

## 2025-04-17 NOTE — PHYSICAL THERAPY INITIAL EVALUATION ADULT - ADDITIONAL COMMENTS
Pt. reports he lives with his wife and children in a private house with 1 small step to negotiate. Pt. reports he was fully independent PTA and did not use an AD.

## 2025-04-17 NOTE — H&P ADULT - NSHPLABSRESULTS_GEN_ALL_CORE
.  LABS:                         12.8   5.22  )-----------( 144      ( 17 Apr 2025 00:20 )             39.3     04-17    139  |  105  |  18  ----------------------------<  123[H]  5.0   |  24  |  1.4    Ca    9.1      17 Apr 2025 00:20    TPro  7.6  /  Alb  4.3  /  TBili  0.6  /  DBili  <0.2  /  AST  35  /  ALT  18  /  AlkPhos  52  04-17    PT/INR - ( 17 Apr 2025 00:20 )   PT: 11.30 sec;   INR: 0.96 ratio         PTT - ( 17 Apr 2025 00:20 )  PTT:29.8 sec  Urinalysis Basic - ( 17 Apr 2025 00:20 )    Color: x / Appearance: x / SG: x / pH: x  Gluc: 123 mg/dL / Ketone: x  / Bili: x / Urobili: x   Blood: x / Protein: x / Nitrite: x   Leuk Esterase: x / RBC: x / WBC x   Sq Epi: x / Non Sq Epi: x / Bacteria: x      RADIOLOGY, EKG & ADDITIONAL TESTS: Reviewed.     CT Angio Chest PE Protocol w/ IV Cont (04.17.25 @ 02:06) >  IMPRESSION:    1.  Left lower lobe subsegmental pulmonary emboli. No evidence of right   heart strain.  2.  Cardiomegaly with groundglass opacities, which could represent mild   pulmonary edema in the appropriate clinical setting. Trace left pleural   effusion.  3.  No evidence of acute abdominal pathology.  4.  --- End of Report ---  DARA JOYCE MD; Resident Radiologist  This document has been electronically signed.  MIKE LOGAN MD; Attending Radiologist  This document has been electronically signed. Apr 17 2025  3:02AM  < end of copied text >      < from: CT Abdomen and Pelvis w/ IV Cont (04.17.25 @ 02:07) >  IMPRESSION:  1.  Left lower lobe subsegmental pulmonary emboli. No evidence of right   heart strain.  2.  Cardiomegaly with groundglass opacities, which could represent mild   pulmonary edema in the appropriate clinical setting. Trace left pleural   effusion.  3.  No evidence of acute abdominal pathology.  4.  --- End of Report ---  DARA JOYCE MD; Resident Radiologist  This document has been electronically signed.  MIKE LOGAN MD; Attending Radiologist  This document has been electronically signed. Apr 17 2025  3:02AM LABS:                         12.8   5.22  )-----------( 144      ( 17 Apr 2025 00:20 )             39.3     04-17    139  |  105  |  18  ----------------------------<  123[H]  5.0   |  24  |  1.4    Ca    9.1      17 Apr 2025 00:20    TPro  7.6  /  Alb  4.3  /  TBili  0.6  /  DBili  <0.2  /  AST  35  /  ALT  18  /  AlkPhos  52  04-17    PT/INR - ( 17 Apr 2025 00:20 )   PT: 11.30 sec;   INR: 0.96 ratio         PTT - ( 17 Apr 2025 00:20 )  PTT:29.8 sec  Urinalysis Basic - ( 17 Apr 2025 00:20 )    Color: x / Appearance: x / SG: x / pH: x  Gluc: 123 mg/dL / Ketone: x  / Bili: x / Urobili: x   Blood: x / Protein: x / Nitrite: x   Leuk Esterase: x / RBC: x / WBC x   Sq Epi: x / Non Sq Epi: x / Bacteria: x      RADIOLOGY, EKG & ADDITIONAL TESTS: Reviewed.     CT Angio Chest PE Protocol w/ IV Cont (04.17.25 @ 02:06) >  IMPRESSION:    1.  Left lower lobe subsegmental pulmonary emboli. No evidence of right   heart strain.  2.  Cardiomegaly with groundglass opacities, which could represent mild   pulmonary edema in the appropriate clinical setting. Trace left pleural   effusion.  3.  No evidence of acute abdominal pathology.  4.  --- End of Report ---  DARA JOYCE MD; Resident Radiologist  This document has been electronically signed.  MIKE LOGAN MD; Attending Radiologist  This document has been electronically signed. Apr 17 2025  3:02AM  < end of copied text >      < from: CT Abdomen and Pelvis w/ IV Cont (04.17.25 @ 02:07) >  IMPRESSION:  1.  Left lower lobe subsegmental pulmonary emboli. No evidence of right   heart strain.  2.  Cardiomegaly with groundglass opacities, which could represent mild   pulmonary edema in the appropriate clinical setting. Trace left pleural   effusion.  3.  No evidence of acute abdominal pathology.  4.  --- End of Report ---  DARA JOYCE MD; Resident Radiologist  This document has been electronically signed.  MIKE LOGAN MD; Attending Radiologist  This document has been electronically signed. Apr 17 2025  3:02AM

## 2025-04-17 NOTE — PATIENT PROFILE ADULT - FALL HARM RISK - UNIVERSAL INTERVENTIONS
Detail Level: Detailed
Quality 130: Documentation Of Current Medications In The Medical Record: Current Medications with Name, Dosage, Frequency, or Route not Documented, Reason not Given
Quality 431: Preventive Care And Screening: Unhealthy Alcohol Use - Screening: Patient not screened for unhealthy alcohol use using a systematic screening method
Quality 226: Preventive Care And Screening: Tobacco Use: Screening And Cessation Intervention: Tobacco Screening not Performed
Bed in lowest position, wheels locked, appropriate side rails in place/Call bell, personal items and telephone in reach/Instruct patient to call for assistance before getting out of bed or chair/Non-slip footwear when patient is out of bed/[place] to call system/Physically safe environment - no spills, clutter or unnecessary equipment/Purposeful Proactive Rounding/Room/bathroom lighting operational, light cord in reach

## 2025-04-17 NOTE — PHYSICAL THERAPY INITIAL EVALUATION ADULT - GENERAL OBSERVATIONS, REHAB EVAL
Pt. encountered alert and NAD, supine in bed, agreeable to PT IE (+)heparin drip, paused for ambulation. Pt. left as found, supine in bed (+)heparin drip (+)call bell (+)alarms, NAD

## 2025-04-17 NOTE — H&P ADULT - HISTORY OF PRESENT ILLNESS
The patient is a 74 yo M with a PMHX of:  Patient presents to ED c/o lower abd pain x one week, intermittent episodes of chest pain/sob/dizziness. Patient returned from [facility] on March 23rd. Denies f/c/n/v. Patient with h/o prostate CA and had received radiation therapy in 2021. Patient started having hematuria today, which continued, so had come to ED for evaluation. Patient stated that, while in ED, after using bathroom, something came out in the urine, not sure, what it was, but he did not collect it and had flushed the toilet. Patient denies neck pain/back pain. Denies any other neurologic symptoms.    ED Vitals: /83, HR 78, Afebrile, 99% O2 Sat on RA, RR 18    Labs: Hg 12.8, D dimer 717, trop 9-> 11, BUN/Creat 18/1.4, GFR 52, Lipase 128, , Pro BNP 48    Imaging:    CT PE protocol- 1.  Left lower lobe subsegmental pulmonary emboli. No evidence of right heart strain.2.  Cardiomegaly with groundglass opacities, which could represent mild pulmonary edema in the appropriate clinical setting.Trace left pleural effusion.3.  No evidence of acute abdominal pathology.  CT Head- No CT evidence of acute intracranial pathology.  LE duplex: *prelim* No evidence of deep venous thrombosis in either lower extremity.    EKG:     Interventions: s/p 1 dose Lovenox    Patient admitted to medicine/tele monitoring for management of acute subsegmental PE.      The patient is a 76 yo M with a PMHX of:  Patient presents to ED c/o lower abd pain x one week, intermittent episodes of chest pain/sob/dizziness. Patient returned from [facility] on March 23rd. Denies f/c/n/v. Patient with h/o prostate CA and had received radiation therapy in 2021. Patient started having hematuria today, which continued, so had come to ED for evaluation. Patient stated that, while in ED, after using bathroom, something came out in the urine, not sure, what it was, but he did not collect it and had flushed the toilet. Patient denies neck pain/back pain. Denies any other neurologic symptoms.    ED Vitals: /83, HR 78, Afebrile, 99% O2 Sat on RA, RR 18    Labs: Hg 12.8, D dimer 717, trop 9-> 11, BUN/Creat 18/1.4, GFR 52, Lipase 128, , Pro BNP 48    Imaging:    CT PE protocol- 1.  Left lower lobe subsegmental pulmonary emboli. No evidence of right heart strain.2.  Cardiomegaly with groundglass opacities, which could represent mild pulmonary edema in the appropriate clinical setting.Trace left pleural effusion.3.  No evidence of acute abdominal pathology.  CT Head- No CT evidence of acute intracranial pathology.  LE duplex: *prelim* No evidence of deep venous thrombosis in either lower extremity.    Interventions: s/p 1 dose Lovenox    Patient admitted to medicine/tele monitoring for management of acute subsegmental PE.      The patient is a 76 yo M with a PMHX of HTN, HLD, CKD Stage 3a, Hx of prostate CA (s/p radiation therapy in 2021), Pre-diabetes, BPH? who presented to the ED with hematuria, chest pain for the past 2 days. Patient states hematuria was been chronic for 2 years but progressively worsened over the past 2 days, urine is blood tinged but without clots/ denies any other urinary sx- dysuria/hesitancy/urgency/dribbling. Patient measured his BP at home which was 160/80s- with these two sx,  was concerned and presented to the ED for further evaluation. On ROS, patient reported b/l chest wall pain, worse in inspiration radiating to his mid back associated with lightheadedness and SOB. Patient reports recent travel to [facility], for a 10 day trip, where he did not experience this chest pain and was in his normal state of health. Patient denies any HARDEN, LE edema, palpitations, orthopnea, PND. Denies fever/chills, nausea, vomiting diarrhea, constipation. Denies sick contacts, recent illnesses.     ED Vitals: /83, HR 78, Afebrile, 99% O2 Sat on RA, RR 18    Labs: Hg 12.8, **D dimer 717, trop 9-> 11, BUN/Creat 18/1.4, GFR 52, Lipase 128, , Pro BNP 48    Imaging:    CT PE protocol for pleuritic c/p- 1.  Left lower lobe subsegmental pulmonary emboli. No evidence of right heart strain. 2.  Cardiomegaly with groundglass opacities, which could represent mild pulmonary edema in the appropriate clinical setting. Trace left pleural effusion.3.  No evidence of acute abdominal pathology.  CT Head for dizziness- No CT evidence of acute intracranial pathology.  LE duplex: *prelim* No evidence of deep venous thrombosis in either lower extremity.    Interventions: s/p 1 dose Lovenox     Patient admitted to medicine/tele monitoring for management of acute subsegmental PE.

## 2025-04-18 ENCOUNTER — TRANSCRIPTION ENCOUNTER (OUTPATIENT)
Age: 75
End: 2025-04-18

## 2025-04-18 VITALS
HEART RATE: 68 BPM | TEMPERATURE: 98 F | DIASTOLIC BLOOD PRESSURE: 71 MMHG | SYSTOLIC BLOOD PRESSURE: 123 MMHG | OXYGEN SATURATION: 99 %

## 2025-04-18 LAB
A1C WITH ESTIMATED AVERAGE GLUCOSE RESULT: 5.6 % — SIGNIFICANT CHANGE UP (ref 4–5.6)
ALBUMIN SERPL ELPH-MCNC: 4.1 G/DL — SIGNIFICANT CHANGE UP (ref 3.5–5.2)
ALP SERPL-CCNC: 53 U/L — SIGNIFICANT CHANGE UP (ref 30–115)
ALT FLD-CCNC: 19 U/L — SIGNIFICANT CHANGE UP (ref 0–41)
ANION GAP SERPL CALC-SCNC: 9 MMOL/L — SIGNIFICANT CHANGE UP (ref 7–14)
APTT BLD: 38.6 SEC — SIGNIFICANT CHANGE UP (ref 27–39.2)
APTT BLD: >200 SEC — CRITICAL HIGH (ref 27–39.2)
AST SERPL-CCNC: 24 U/L — SIGNIFICANT CHANGE UP (ref 0–41)
BASOPHILS # BLD AUTO: 0.02 K/UL — SIGNIFICANT CHANGE UP (ref 0–0.2)
BASOPHILS NFR BLD AUTO: 0.4 % — SIGNIFICANT CHANGE UP (ref 0–1)
BILIRUB SERPL-MCNC: 0.6 MG/DL — SIGNIFICANT CHANGE UP (ref 0.2–1.2)
BUN SERPL-MCNC: 14 MG/DL — SIGNIFICANT CHANGE UP (ref 10–20)
CALCIUM SERPL-MCNC: 9.3 MG/DL — SIGNIFICANT CHANGE UP (ref 8.4–10.5)
CHLORIDE SERPL-SCNC: 105 MMOL/L — SIGNIFICANT CHANGE UP (ref 98–110)
CHOLEST SERPL-MCNC: 129 MG/DL — SIGNIFICANT CHANGE UP
CO2 SERPL-SCNC: 27 MMOL/L — SIGNIFICANT CHANGE UP (ref 17–32)
CREAT SERPL-MCNC: 1.2 MG/DL — SIGNIFICANT CHANGE UP (ref 0.7–1.5)
CULTURE RESULTS: SIGNIFICANT CHANGE UP
EGFR: 62 ML/MIN/1.73M2 — SIGNIFICANT CHANGE UP
EGFR: 62 ML/MIN/1.73M2 — SIGNIFICANT CHANGE UP
EOSINOPHIL # BLD AUTO: 0.16 K/UL — SIGNIFICANT CHANGE UP (ref 0–0.7)
EOSINOPHIL NFR BLD AUTO: 3.4 % — SIGNIFICANT CHANGE UP (ref 0–8)
ESTIMATED AVERAGE GLUCOSE: 114 MG/DL — SIGNIFICANT CHANGE UP (ref 68–114)
GLUCOSE SERPL-MCNC: 121 MG/DL — HIGH (ref 70–99)
HCT VFR BLD CALC: 37.8 % — LOW (ref 42–52)
HCT VFR BLD CALC: 38.3 % — LOW (ref 42–52)
HDLC SERPL-MCNC: 40 MG/DL — LOW
HGB BLD-MCNC: 12.4 G/DL — LOW (ref 14–18)
HGB BLD-MCNC: 12.4 G/DL — LOW (ref 14–18)
IMM GRANULOCYTES NFR BLD AUTO: 0.2 % — SIGNIFICANT CHANGE UP (ref 0.1–0.3)
LDLC SERPL-MCNC: 71 MG/DL — SIGNIFICANT CHANGE UP
LIPID PNL WITH DIRECT LDL SERPL: 71 MG/DL — SIGNIFICANT CHANGE UP
LYMPHOCYTES # BLD AUTO: 2.27 K/UL — SIGNIFICANT CHANGE UP (ref 1.2–3.4)
LYMPHOCYTES # BLD AUTO: 47.6 % — SIGNIFICANT CHANGE UP (ref 20.5–51.1)
MCHC RBC-ENTMCNC: 29.1 PG — SIGNIFICANT CHANGE UP (ref 27–31)
MCHC RBC-ENTMCNC: 29.1 PG — SIGNIFICANT CHANGE UP (ref 27–31)
MCHC RBC-ENTMCNC: 32.4 G/DL — SIGNIFICANT CHANGE UP (ref 32–37)
MCHC RBC-ENTMCNC: 32.8 G/DL — SIGNIFICANT CHANGE UP (ref 32–37)
MCV RBC AUTO: 88.7 FL — SIGNIFICANT CHANGE UP (ref 80–94)
MCV RBC AUTO: 89.9 FL — SIGNIFICANT CHANGE UP (ref 80–94)
MONOCYTES # BLD AUTO: 0.47 K/UL — SIGNIFICANT CHANGE UP (ref 0.1–0.6)
MONOCYTES NFR BLD AUTO: 9.9 % — HIGH (ref 1.7–9.3)
NEUTROPHILS # BLD AUTO: 1.84 K/UL — SIGNIFICANT CHANGE UP (ref 1.4–6.5)
NEUTROPHILS NFR BLD AUTO: 38.5 % — LOW (ref 42.2–75.2)
NONHDLC SERPL-MCNC: 89 MG/DL — SIGNIFICANT CHANGE UP
NRBC BLD AUTO-RTO: 0 /100 WBCS — SIGNIFICANT CHANGE UP (ref 0–0)
NRBC BLD AUTO-RTO: 0 /100 WBCS — SIGNIFICANT CHANGE UP (ref 0–0)
NT-PROBNP SERPL-SCNC: 150 PG/ML — SIGNIFICANT CHANGE UP (ref 0–300)
PLATELET # BLD AUTO: 135 K/UL — SIGNIFICANT CHANGE UP (ref 130–400)
PLATELET # BLD AUTO: 138 K/UL — SIGNIFICANT CHANGE UP (ref 130–400)
PMV BLD: 11.2 FL — HIGH (ref 7.4–10.4)
PMV BLD: 11.6 FL — HIGH (ref 7.4–10.4)
POTASSIUM SERPL-MCNC: 4.1 MMOL/L — SIGNIFICANT CHANGE UP (ref 3.5–5)
POTASSIUM SERPL-SCNC: 4.1 MMOL/L — SIGNIFICANT CHANGE UP (ref 3.5–5)
PROT SERPL-MCNC: 6.7 G/DL — SIGNIFICANT CHANGE UP (ref 6–8)
RBC # BLD: 4.26 M/UL — LOW (ref 4.7–6.1)
RBC # BLD: 4.26 M/UL — LOW (ref 4.7–6.1)
RBC # FLD: 12.1 % — SIGNIFICANT CHANGE UP (ref 11.5–14.5)
RBC # FLD: 12.2 % — SIGNIFICANT CHANGE UP (ref 11.5–14.5)
SODIUM SERPL-SCNC: 141 MMOL/L — SIGNIFICANT CHANGE UP (ref 135–146)
SPECIMEN SOURCE: SIGNIFICANT CHANGE UP
TRIGL SERPL-MCNC: 93 MG/DL — SIGNIFICANT CHANGE UP
TROPONIN T, HIGH SENSITIVITY RESULT: 16 NG/L — SIGNIFICANT CHANGE UP (ref 6–21)
WBC # BLD: 4.77 K/UL — LOW (ref 4.8–10.8)
WBC # BLD: 4.95 K/UL — SIGNIFICANT CHANGE UP (ref 4.8–10.8)
WBC # FLD AUTO: 4.77 K/UL — LOW (ref 4.8–10.8)
WBC # FLD AUTO: 4.95 K/UL — SIGNIFICANT CHANGE UP (ref 4.8–10.8)

## 2025-04-18 PROCEDURE — 99222 1ST HOSP IP/OBS MODERATE 55: CPT

## 2025-04-18 PROCEDURE — 99239 HOSP IP/OBS DSCHRG MGMT >30: CPT

## 2025-04-18 RX ORDER — ENOXAPARIN SODIUM 100 MG/ML
100 INJECTION SUBCUTANEOUS EVERY 12 HOURS
Refills: 0 | Status: DISCONTINUED | OUTPATIENT
Start: 2025-04-18 | End: 2025-04-18

## 2025-04-18 RX ORDER — APIXABAN 2.5 MG/1
1 TABLET, FILM COATED ORAL
Qty: 90 | Refills: 0
Start: 2025-04-18 | End: 2025-05-17

## 2025-04-18 RX ORDER — ENOXAPARIN SODIUM 100 MG/ML
110 INJECTION SUBCUTANEOUS EVERY 12 HOURS
Refills: 0 | Status: DISCONTINUED | OUTPATIENT
Start: 2025-04-18 | End: 2025-04-18

## 2025-04-18 RX ORDER — TAMSULOSIN HYDROCHLORIDE 0.4 MG/1
1 CAPSULE ORAL
Qty: 0 | Refills: 0 | DISCHARGE
Start: 2025-04-18

## 2025-04-18 RX ADMIN — CALCITRIOL 0.5 MICROGRAM(S): 0.5 CAPSULE, GELATIN COATED ORAL at 12:28

## 2025-04-18 RX ADMIN — HEPARIN SODIUM 0 UNIT(S)/HR: 1000 INJECTION INTRAVENOUS; SUBCUTANEOUS at 04:18

## 2025-04-18 RX ADMIN — PREGABALIN 200 MILLIGRAM(S): 75 CAPSULE ORAL at 05:20

## 2025-04-18 RX ADMIN — LABETALOL HYDROCHLORIDE 300 MILLIGRAM(S): 200 TABLET, FILM COATED ORAL at 07:36

## 2025-04-18 RX ADMIN — HEPARIN SODIUM 1300 UNIT(S)/HR: 1000 INJECTION INTRAVENOUS; SUBCUTANEOUS at 05:18

## 2025-04-18 NOTE — DISCHARGE NOTE PROVIDER - NSDCCPCAREPLAN_GEN_ALL_CORE_FT
PRINCIPAL DISCHARGE DIAGNOSIS  Diagnosis: Pulmonary embolism  Assessment and Plan of Treatment: During this hospitalization, you were diagnosed with a pulmonary embolsim. In your case, you have a  deep vein thrombosis (DVT) which is a blood clot in a large vein deep in a leg, arm, or elsewhere in the body. The clot can separate from the vein, travel to the lungs and cut off blood flow. This is a pulmonary embolism (PE). Pulmonary embolism is very serious. Both the prevention and the treatment are similar for DVT and PE.   To help prevent more blood clots from forming, please see your primary care doctor within one week of discharge, and please take your medicines exactly as instructed. Don’t skip doses. You have been prescribed a medication to thin the blood, so that more clots do not form.  Have all lab tests as recommended. This is very important when you take medicines to prevent blood clots. Make sure you stay active and walk for at least 30 minutes every day. When sitting for long periods of time, move your knees, ankles, feet, and toes.    If you smoke, get help to quit. Stay at a healthy weight. Try to exercise at least 30 minutes on most days. Before starting an exercise program, talk with your primary care provider. When traveling by car, make frequent stops to get up and move around. On long airplane rides, get up and move around when possible. If you can’t get up, wiggle your toes, move your ankles and tighten your calves to keep your blood moving.  Seek immediate medical care if you have pain, swelling, and redness in your leg, arm, or other body area. These symptoms may mean another blood clot. Also call your healthcare provider if you have signs and symptoms of bleeding, like blood in your urine, bleeding with bowel movements, or bleeding from the nose, gums, a cut, or vagina. Call 911 if you have symptoms of a blood clot in the lungs including: Chest pain, trouble breathing, coughing blood, fast heartbeat, heavy or uncontrolled bleeding.        SECONDARY DISCHARGE DIAGNOSES  Diagnosis: Hematuria  Assessment and Plan of Treatment:     Diagnosis: Lightheadedness  Assessment and Plan of Treatment:     Diagnosis: Lower abdominal pain  Assessment and Plan of Treatment:     Diagnosis: Chest pain  Assessment and Plan of Treatment:      PRINCIPAL DISCHARGE DIAGNOSIS  Diagnosis: Pulmonary embolism  Assessment and Plan of Treatment: During this hospitalization, you were diagnosed with a pulmonary embolsim. In your case, you have a  deep vein thrombosis (DVT) which is a blood clot in a large vein deep in a leg, arm, or elsewhere in the body. The clot can separate from the vein, travel to the lungs and cut off blood flow. This is a pulmonary embolism (PE). Pulmonary embolism is very serious. Both the prevention and the treatment are similar for DVT and PE.   take eliquis 10 mg every 12 hours for 1 week then start taking 5 mg every 12 hours after that. You iwll need to be on this medication for at least 3 months  To help prevent more blood clots from forming, please see your primary care doctor within one week of discharge, and please take your medicines exactly as instructed. Don’t skip doses. You have been prescribed a medication to thin the blood, so that more clots do not form.  Have all lab tests as recommended. This is very important when you take medicines to prevent blood clots. Make sure you stay active and walk for at least 30 minutes every day.    If you smoke, get help to quit. Stay at a healthy weight. Try to exercise at least 30 minutes on most days. Before starting an exercise program, talk with your primary care provider. When traveling by car, make frequent stops to get up and move around. On long airplane rides, get up and move around when possible. If you can’t get up, wiggle your toes, move your ankles and tighten your calves to keep your blood moving.  Seek immediate medical care if you have pain, swelling, and redness in your leg, arm, or other body area. These symptoms may mean another blood clot. Also call your healthcare provider if you have signs and symptoms of bleeding, like blood in your urine, bleeding with bowel movements, or bleeding from the nose, gums, a cut, or vagina. Call 911 if you have symptoms of a blood clot in the lungs including: Chest pain, trouble breathing, coughing blood, fast heartbeat, heavy or uncontrolled bleeding.        SECONDARY DISCHARGE DIAGNOSES  Diagnosis: Hematuria  Assessment and Plan of Treatment: Please follow up with your urologist within 2 weeks    Diagnosis: Lightheadedness  Assessment and Plan of Treatment:     Diagnosis: Lower abdominal pain  Assessment and Plan of Treatment:     Diagnosis: Chest pain  Assessment and Plan of Treatment:

## 2025-04-18 NOTE — DISCHARGE NOTE NURSING/CASE MANAGEMENT/SOCIAL WORK - PATIENT PORTAL LINK FT
You can access the FollowMyHealth Patient Portal offered by [facility] by registering at the following website: [email or web address]. By joining [employer]’s FollowMyHealth portal, you will also be able to view your health information using other applications (apps) compatible with our system.

## 2025-04-18 NOTE — PROGRESS NOTE ADULT - ASSESSMENT
The patient is a 74 yo M with PMHx of HTN, HLD, CKD stage 3a, Pre-DM, Hx of prostate Ca (2020, s/p RA)  presenting with dizziness, chest pain and hematuria, found to have an acute subsegmental PE.     # Chest pain   # LLL, acute subsegmental PE, low-risk PE  # Recent travel/immbilization, hx of cancer, D Dimer elevation  - VSS: afebrile, saturating 100% on room air, HD stable  - D dimer: 729  - Echo 4/17 --> Septal hypokinesis with overall mildly decreased global left  ventricular systolic function with a biplane EF of 45%. Mild (grade 1) diastolic dysfunction.  - AC: Heparin drip --> transitioned to Lovenox 4/18 AM  - PESI score: 117, Class IV, 4-11% of 30 day mortality  - Hem onc consult, recs pending  - Pt evaluated by urology in ED --> no interventions   - monitor resp status, O2 as needed    #Hematuria, NEW  # Hx of prostate CA, 2021  # BPH? -   - otherwise asymptomatic, no dysuria/urgency/frequency/dribbling.  - UA: mod RBCs,  152 RBCs, otherwise negative, Pending: Urine cx  - Will initiate anticoagulation with heparin due to more risks vs benefits from current acute PE, monitor urine for blood/clots  - C.w Tamsulosin (takes Silodosin at home)    # CKD, stage 3a  # CKD likely 2.2 Diabetic nephropathy  - Creat 1.4, GFR 52 on admission (baseline)  - Monitor creatinine  - Avoid ACE/ARBs  - Follows with Dr. Maynard OP, currently on calcitriol 0.5 mcg QD, Eplerenone 50 mg QD    #Pre-DM  - Takes Mounjaro as OP  - A1C/ Lipid panel  - POCT Glucose  - -160  - ISS    #HTN  - monitor BP as needed given PE   - C.w labetolol, nifedipine, epleronone for now    # HLD  - C/w Rosuvastatin    # GERD  - c/w famotidine    #Glaucoma  #B/L cataracts, previously diagnosed  - c/w eye drops- Latanoprost    #Maintenance  Diet: Regular Dash/Carb consistent  DVT PPX: Heparin drip for PE  GI ppx: none indicated  Dispo: Acute, tele monitoring    Pending(s): Hem onc consult, transitioned to lovenox for AC, monitor HD and resp status

## 2025-04-18 NOTE — DISCHARGE NOTE NURSING/CASE MANAGEMENT/SOCIAL WORK - NSDCPEELIQUIS_GEN_ALL_CORE
Apixaban/Eliquis - Compliance/Apixaban/Eliquis - Dietary Advice/Apixaban/Eliquis - Follow up monitoring/Apixaban/Eliquis - Potential for adverse drug reactions and interactions
normal for race

## 2025-04-18 NOTE — DISCHARGE NOTE PROVIDER - HOSPITAL COURSE
The patient was a 75-year-old male with a past medical history of hypertension, hyperlipidemia, stage 3a chronic kidney disease, pre-diabetes, and a history of prostate cancer status post radiation therapy in 2020. He presented with dizziness, chest pain, and hematuria, and was diagnosed with an acute subsegmental pulmonary embolism.    Regarding chest pain, the patient had a low-risk acute subsegmental PE affecting the left lower lobe, likely related to recent travel and immobilization, a history of cancer, and an elevated D-dimer of 729. Vital signs were stable; he was afebrile and saturating 100% on room air. An echocardiogram performed on April 17 showed septal hypokinesis with mildly decreased global left ventricular systolic function and a biplane ejection fraction of 45%, along with mild diastolic dysfunction. Anticoagulation treatment started with a heparin drip, transitioning to Lovenox on the morning of April 18. The PESI score was 117, indicating Class IV risk with a 4-11% chance of 30-day mortality. A hematology oncology consult was conducted; follow-up with Dr. Blake was advised regarding treatment duration. The patient was evaluated by urology in the emergency department, but no interventions were necessary; patient can follow outpatient per their recommendations. His respiratory status was monitored, with supplemental oxygen provided as needed.    For hematuria, noted for the first time, the history of prostate cancer from 2021 and possible BPH were considered. The patient was asymptomatic otherwise, with no dysuria, urgency, frequency, or dribbling. Urinalysis showed moderate RBCs at 152, with a urine culture resulting negative. Anticoagulation with heparin was initiated despite the hematuria due to the benefits outweighing the risks given the acute PE; plan to discharge patient on Eliquis, which was priced and sent as a prescription in advance. The patient's urine was monitored for blood and clots, and he continued taking Tamsulosin at home alongside Silodosin.    Regarding CKD stage 3a, likely secondary to diabetic nephropathy, the patient's creatinine level was 1.4 and GFR 52 at admission, consistent with baseline levels. Creatinine levels were monitored, and ACE/ARBs were avoided. He followed up with Dr. Maynard on an outpatient basis and was currently taking calcitriol 0.5 mcg daily and eplerenone 50 mg daily.    For pre-diabetes management, he used Mounja as an outpatient. An A1C and lipid panel were performed, along with point-of-care testing of glucose levels, which ranged from 140-160. Hypertension management included monitoring blood pressure as needed, considering the presence of a PE, with continuation of labetalol, nifedipine, and eplerenone.    For hyperlipidemia, Rosuvastatin was continued. For GERD, famotidine was continued. The patient had previously diagnosed glaucoma and bilateral cataracts, which were managed with continued Latanoprost eye drops.    Patient was seen and examined 4/18/25 and deemed eligible for discharge

## 2025-04-18 NOTE — DISCHARGE NOTE PROVIDER - ATTENDING ATTESTATION STATEMENT
I have personally seen and examined the patient. I have collaborated with and supervised the Anesthesia Type: 1% lidocaine with 1:100,000 epinephrine and a 1:10 solution of 8.4% sodium bicarbonate

## 2025-04-18 NOTE — CHART NOTE - NSCHARTNOTEFT_GEN_A_CORE
Urology consulted for microscopic hematuria in the setting of history of prostate cancer in 2021 and acute subsegmental PE. We were made aware of the patient once the patient arrived in the ER. We spoke with the ER and discussed outpatient follow up.    Urinalysis (04.17.25 @ 00:19)   Glucose Qualitative, Urine: Negative mg/dL  Blood, Urine: Moderate  pH Urine: 7.0  Color: Yellow  Urine Appearance: Clear  Bilirubin: Negative  Ketone - Urine: Negative mg/dL  Specific Gravity: 1.012  Protein, Urine: Negative mg/dL  Urobilinogen: 0.2 mg/dL  Nitrite: Negative  Leukocyte Esterase Concentration: Negative  Urine Microscopic-Add On (NC) (04.17.25 @ 00:19)   White Blood Cell - Urine: 0 /HPF  Red Blood Cell - Urine: 152 /HPF  Bacteria: Negative /HPF  Cast: 0 /LPF  Epithelial Cells: 0 /HPF    Recommend follow up with pt's private urologist as outpatient for management of microscopic hematuria (as urine noted to be yellow) and prostate cancer monitoring   Ensure pt is adequately voiding with periodic bladder scans. If retaining >350cc, would recommend straight cath vs indwelling bean catheter.   Discussed with medical team .

## 2025-04-18 NOTE — DISCHARGE NOTE NURSING/CASE MANAGEMENT/SOCIAL WORK - FINANCIAL ASSISTANCE
[facility] provides services at a reduced cost to those who are determined to be eligible through [facility]’s financial assistance program. Information regarding [facility]’s financial assistance program can be found by going to [email or web address] or by calling [phone].

## 2025-04-18 NOTE — CONSULT NOTE ADULT - ASSESSMENT
Assessment & Plan:    The patient is a 74 yo M with a PMHX of HTN, HLD, CKD Stage 3a, Hx of prostate CA (s/p radiation therapy in 2021), Pre-diabetes, BPH? who presented to the ED on the 17th April 2025 with hematuria, chest pain for the past 2 days. Patient states hematuria was been chronic for 2 years but progressively worsened over the past 2 days, urine is blood tinged but without clots/ denies any other urinary sx- dysuria/hesitancy/urgency/dribbling. Patient measured his BP at home which was 160/80s; patient was concerned and presented to the ED for further evaluation. Patient reported additional b/l chest wall pain, worse in inspiration radiating to his mid back associated with lightheadedness and SOB during ED visit. Patient reports recent travel to [facility], for a 10 day trip, where he did not experience this chest pain. Patient denies fever/chills, nausea, vomiting diarrhea, constipation. Denies sick contacts, recent illnesses.     Patient has a history of Prostate Adenocarcinoma diagnosed in September 2020;  Grade Group 2, (David score 3+4=7) ; s/p RT with Dr. Bowers in 12/2020, 14 DOSES. An MRI of the prostate (11/2021) and bone scan (12/2021) showed no sign of the disease; hence no recurrence expected. PET/ CT PSMA showed no metastatic disease. His PSA decreased after RT therapy and began to downtrend in 5/2/2022; with it being 0.33 in 4/23. Heme/Onc was consulted because of his acute onset subsegmental PE and prior history of prostate cancer while currently not on anticoagulants.    #     The patient is a 74 yo M with a PMHX of HTN, HLD, CKD Stage 3a, Hx of prostate CA (s/p radiation therapy in 2021), Pre-diabetes, BPH? who presented to the ED on the 17th April 2025 with hematuria, chest pain for the past 2 days. Patyient presented to the ED with hematuria, chest pain and hypertension. Patient reports recent travel to [facility], for a 10 day trip, where he did not experience this chest pain. Patient denies fever/chills, nausea, vomiting diarrhea, constipation. Denies sick contacts, recent illnesses. A CTPA done on the 17th April 2025 shows Left lower lobe subsegmental pulmonary emboli. No evidence of right heart strain.     Heme/Onc was consulted because of his acute onset subsegmental PE and prior history of prostate cancer while currently not on anticoagulants.    #Subsegmental Pulmonary Embolism  - CTPA on the 17th April shows Left lower lobe subsegmental pulmonary emboli. No evidence of right   heart strain.  - Recent travel back to the [place] from [facility] on an 11 hour flight (22nd March 2025)  - Elevated D-dimer of 729  - Echo done on the 17th April  Septal hypokinesis with overall mildly decreased global left  ventricular systolic function with a biplane EF of 45%. Mild (grade 1) diastolic dysfunction.  - AC: Heparin drip --> transitioned to Lovenox 4/18 AM    #Hematuria  - Patient reports this as his first episode of hematuria  - He has no dysuria, no change in frequency or urgency  - Reports his hematuria to have cleared.    #Prostate Adenocarcinoma  - Patient has a history of Prostate Adenocarcinoma diagnosed in September 2020;  Grade Group 2, (David score 3+4=7) ; s/p RT with Dr. Bowers in 12/2020, 14 DOSES.   - An MRI of the prostate (11/2021) and bone scan (12/2021) showed no sign of the disease; hence no recurrence expected.   - PET/ CT PSMA showed no evidence of metastatic disease.   - His PSA decreased after RT therapy and began to downtrend in 5/2/2022; with it being 0.33 in 4/23.   - Patient regularly sees his urologist and has noted his PSA levels have been normal so far. Patient mentions being on ?silodosin for his BPH.   - He reports no change in frequency or dysuria; and now reports to have no more hematuria.  - Reports mew onset weakness and point tenderness on his thoracic spine with pain radiating to his lower back starting February of this year         The patient is a 74 yo M with a PMHX of HTN, HLD, CKD Stage 3a, Hx of prostate CA (s/p radiation therapy in 2021), Pre-diabetes, BPH? who presented to the ED on the 17th April 2025 with hematuria, chest pain for the past 2 days. Patyient presented to the ED with hematuria, chest pain and hypertension. Patient reports recent travel to [facility], for a 10 day trip, where he did not experience this chest pain. Patient denies fever/chills, nausea, vomiting diarrhea, constipation. Denies sick contacts, recent illnesses. A CTPA done on the 17th April 2025 shows Left lower lobe subsegmental pulmonary emboli. No evidence of right heart strain.     Heme/Onc was consulted because of his acute onset subsegmental PE and prior history of prostate cancer while currently not on anticoagulants.    #Subsegmental Pulmonary Embolism  - CTPA on the 17th April shows Left lower lobe subsegmental pulmonary emboli. No evidence of right   heart strain.  - Recent travel [place] the [place] from [facility] on an 11 hour flight (22nd March 2025)  - Elevated D-dimer of 729  - Echo done on the 17th April  Septal hypokinesis with overall mildly decreased global left  ventricular systolic function with a biplane EF of 45%. Mild (grade 1) diastolic dysfunction.  - AC: Heparin drip --> transitioned to Lovenox 4/18 AM    #Hematuria  - Patient reports this as his first episode of hematuria  - He has no dysuria, no change in frequency or urgency  - Reports his hematuria to have cleared.    #Prostate Adenocarcinoma  - Patient has a history of Prostate Adenocarcinoma diagnosed in September 2020;  Grade Group 2, (David score 3+4=7) ; s/p RT with Dr. Bowers in 12/2020, 14 DOSES.   - An MRI of the prostate (11/2021) and bone scan (12/2021) showed no sign of the disease; hence no recurrence expected.   - PET/ CT PSMA showed no evidence of metastatic disease.   - His PSA decreased after RT therapy and began to downtrend in 5/2/2022; with it being 0.33 in 4/23.   - Patient regularly sees his urologist and has noted his PSA levels have been normal so far. Patient mentions being on ?silodosin for his BPH.   - He reports no change in frequency or dysuria; and now reports to have no more hematuria.  - Reports mew onset weakness and point tenderness on his thoracic spine with pain radiating to his lower back starting February of this year      Plan: Patient to continue on Eliquis (10mg) for 7 days BID; continue followup with outpatient urologist   The patient is a 76 yo M with a PMHX of HTN, HLD, CKD Stage 3a, Hx of prostate CA (s/p radiation therapy in 2021), Pre-diabetes, BPH? who presented to the ED on the 17th April 2025 with hematuria, chest pain for the past 2 days. Patyient presented to the ED with hematuria, chest pain and hypertension. Patient reports recent travel to [facility], for a 10 day trip, where he did not experience this chest pain. Patient denies fever/chills, nausea, vomiting diarrhea, constipation. Denies sick contacts, recent illnesses. A CTPA done on the 17th April 2025 shows Left lower lobe subsegmental pulmonary emboli. No evidence of right heart strain.     Heme/Onc was consulted because of his acute onset subsegmental PE and prior history of prostate cancer while currently not on anticoagulants.    #Subsegmental Pulmonary Embolism  - CTPA on the 17th April shows Left lower lobe subsegmental pulmonary emboli. No evidence of right   heart strain.  - Recent travel back [place] the [place] from [facility] on an 11 hour flight (22nd March 2025)  - Elevated D-dimer of 729  - Echo done on the 17th April  Septal hypokinesis with overall mildly decreased global left  ventricular systolic function with a biplane EF of 45%. Mild (grade 1) diastolic dysfunction.  - AC: Heparin drip --> transitioned to Lovenox 4/18 AM    #Hematuria  - Patient reports this as his first episode of hematuria  - He has no dysuria, no change in frequency or urgency  - Reports his hematuria to have cleared.    #Prostate Adenocarcinoma  - Patient has a history of Prostate Adenocarcinoma diagnosed in September 2020;  Grade Group 2, (David score 3+4=7) ; s/p RT with Dr. Bowers in 12/2020, 14 DOSES.   - An MRI of the prostate (11/2021) and bone scan (12/2021) showed no sign of the disease; hence no recurrence expected.   - PET/ CT PSMA showed no evidence of metastatic disease.   - His PSA decreased after RT therapy and began to downtrend in 5/2/2022; with it being 0.33 in 4/23.   - Patient regularly sees his urologist and has noted his PSA levels have been normal so far. Patient mentions being on ?silodosin for his BPH.   - He reports no change in frequency or dysuria; and now reports to have no more hematuria.  - Reports new onset generalized weakness and tenderness on his thoracic spine with pain radiating to his lower back starting February of this year, CT abdomen did not show any bony lesions        Plan: Monitor hematuria one more day and if it clears can switch to Eliquis on discharge  We will likely treat this as an unprovoked PE  Can followup with Dr Blake regarding treatment duration  Followup with urology for hematuria and monitoring PSA

## 2025-04-18 NOTE — CONSULT NOTE ADULT - ATTENDING COMMENTS
This is a 75-year-old male with likely favorable intermediate risk prostate CA s/p RT in 2021.  He presented to the hospital due to hematuria and chest pain.  He was found to have left lower lobe segmental PE and was started on heparin drip followed by Lovenox.  Hematology consulted for the workup.    #Likely unprovoked PE  -- Transition to Eliquis starter kit  -- Patient reports he flew back from [place] a month prior  -- HCM: Colonoscopy 5 to 6 months ago  -- Non-smoker  -- Likely will need lifelong blood thinner  -- Follow-up with outpatient hematologist    #Hematuria  #History of prostate cancer  --Follow-up with urology

## 2025-04-18 NOTE — DISCHARGE NOTE PROVIDER - NSDCPNSUBOBJ_GEN_ALL_CORE
Pt seen and examined at bedside. Pt feels well. Denies hematuria while on heparin. As per urology and hematology no further work up. Eliquis sent, pt counseled on risks and benefits. Aware to return to ER if HA, blurry vision, blood in stool occur.

## 2025-04-18 NOTE — CONSULT NOTE ADULT - SUBJECTIVE AND OBJECTIVE BOX
Hematology/Oncology Consult Note    HPI:  The patient is a 74 yo M with a PMHX of HTN, HLD, CKD Stage 3a, Hx of prostate CA (s/p radiation therapy in 2021), Pre-diabetes, BPH? who presented to the ED on the 17th April 2025 with hematuria, chest pain for the past 2 days. Patient states hematuria was been chronic for 2 years but progressively worsened over the past 2 days, urine is blood tinged but without clots/ denies any other urinary sx- dysuria/hesitancy/urgency/dribbling. Patient measured his BP at home which was 160/80s; patient was concerned and presented to the ED for further evaluation. Patient reported additional b/l chest wall pain, worse in inspiration radiating to his mid back associated with lightheadedness and SOB during ED visit. Patient reports recent travel to [facility], for a 10 day trip, where he did not experience this chest pain. Patient denies fever/chills, nausea, vomiting diarrhea, constipation. Denies sick contacts, recent illnesses.     Patient has a history of Prostate Adenocarcinoma diagnosed in September 2020;  Grade Group 2, ([place] score 3+4=7) ; s/p RT with Dr. Bowers in 12/2020, 14 DOSES. An MRI of the prostate (11/2021) and bone scan (12/2021) showed no sign of the disease; hence no recurrence expected. PET/ CT PSMA showed no metastatic disease. His PSA decreased after RT therapy and began to downtrend in 5/2/2022; with it being 0.33 in 4/23.       Labs:     - Hb has been 12.4 (18th April 2025); similar to baseline Hb  - RBC count is low at 4.26  (18th April 2025); similar to baseline   - eGFR was decreased on admission at 52; which is similar to what he has been the past two years.  - Normal creatinine levels at 1.2 (18th April 2025)  - Elevated serum CK levels at 388 on admission  - Elevated Lipase levels  - Moderate blood in urine and elevated RBCs in urine at 152 (17th April 2025); similarly had moderate blood and Elevated RBCs in urine at 14 in 2023  - elevated PTT on both the 17th and the 18th 2025 at over 200 and an elevated D-dimer on the 17th April 2025      Imaging:    CT PE protocol for pleuritic c/p- 1.  Left lower lobe subsegmental pulmonary emboli. No evidence of right heart strain. 2.  Cardiomegaly with groundglass opacities, which could represent mild pulmonary edema in the appropriate clinical setting. Trace left pleural effusion.3.  No evidence of acute abdominal pathology.  CT Head for dizziness- No CT evidence of acute intracranial pathology.  LE duplex: *prelim* No evidence of deep venous thrombosis in either lower extremity.    Interventions: s/p 1 dose Lovenox     Patient admitted to medicine/tele monitoring for management of acute subsegmental PE.      (17 Apr 2025 08:52)      Oncology/Hematology Hx:  - Patient had a prior diagnosis of prostate adenocarcinoma from 09/2020 (Grade group 2; David score 3+4=7); downtrending PSA after RT with PSA being .33 in 4/23. No evidence of metastatic disease was seen in an MRI prostate from 11/2021 and a bone scan on 12/2021.  - Patient previously had Anemia due to B12 deficiency because of elevated MMA.      Allergies    No Known Allergies    Intolerances        MEDICATIONS  (STANDING):  calcitriol   Capsule 0.5 MICROGram(s) Oral daily  enoxaparin Injectable 110 milliGRAM(s) SubCutaneous every 12 hours  famotidine    Tablet 20 milliGRAM(s) Oral at bedtime  labetalol 300 milliGRAM(s) Oral two times a day  latanoprost 0.005% Ophthalmic Solution 1 Drop(s) Both EYES at bedtime  NIFEdipine XL 60 milliGRAM(s) Oral at bedtime  pregabalin 200 milliGRAM(s) Oral two times a day  rosuvastatin 20 milliGRAM(s) Oral at bedtime  tamsulosin 0.4 milliGRAM(s) Oral at bedtime    MEDICATIONS  (PRN):  acetaminophen     Tablet .. 650 milliGRAM(s) Oral every 6 hours PRN Temp greater or equal to 38C (100.4F), Mild Pain (1 - 3)  aluminum hydroxide/magnesium hydroxide/simethicone Suspension 30 milliLiter(s) Oral every 4 hours PRN Dyspepsia  melatonin 3 milliGRAM(s) Oral at bedtime PRN Insomnia  ondansetron Injectable 4 milliGRAM(s) IV Push every 8 hours PRN Nausea and/or Vomiting      PAST MEDICAL & SURGICAL HISTORY:  GERD (gastroesophageal reflux disease)      Hypercholesteremia      HTN (hypertension)      Sleep apnea      Back pain      Enlarged prostate      Enlarged heart      Borderline diabetes      Arthritis      Renal disease      Chronic kidney disease (CKD)      Prostate cancer      Glaucoma      IBD (inflammatory bowel disease)      Vitiligo      Bilateral cataracts  right eye repaired      H/O colonoscopy      FAMILY HISTORY:      SOCIAL HISTORY: No EtOH, no tobacco    REVIEW OF SYSTEMS:    CONSTITUTIONAL: No weakness, fevers or chills  EYES/ENT: No visual changes;  No vertigo or throat pain   NECK: No pain or stiffness  RESPIRATORY: No cough, wheezing, hemoptysis; No shortness of breath  CARDIOVASCULAR: No chest pain or palpitations  GASTROINTESTINAL: No abdominal or epigastric pain. No nausea, vomiting, or hematemesis; No diarrhea or constipation. No melena or hematochezia.  GENITOURINARY: No dysuria, frequency or hematuria  NEUROLOGICAL: No numbness or weakness  SKIN: No itching, burning, rashes, or lesions   All other review of systems is negative unless indicated above.        T(F): 97.4 (04-18-25 @ 05:25), Max: 98.1 (04-17-25 @ 20:13)  HR: 67 (04-18-25 @ 07:10)  BP: 125/74 (04-18-25 @ 07:10)  RR: 18 (04-18-25 @ 07:10)  SpO2: 97% (04-18-25 @ 07:10)  Wt(kg): --    GENERAL: NAD, well-developed  HEAD:  Atraumatic, Normocephalic  EYES: EOMI, PERRLA, conjunctiva and sclera clear  NECK: Supple, No JVD  CHEST/LUNG: Clear to auscultation bilaterally; No wheeze  HEART: Regular rate and rhythm; No murmurs, rubs, or gallops  ABDOMEN: Soft, Nontender, Nondistended; Bowel sounds present  EXTREMITIES:  2+ Peripheral Pulses, No clubbing, cyanosis, or edema  NEUROLOGY: non-focal  SKIN: No rashes or lesions                          12.4   4.77  )-----------( 135      ( 18 Apr 2025 05:30 )             38.3       04-18    141  |  105  |  14  ----------------------------<  121[H]  4.1   |  27  |  1.2    Ca    9.3      18 Apr 2025 05:30    TPro  6.7  /  Alb  4.1  /  TBili  0.6  /  DBili  x   /  AST  24  /  ALT  19  /  AlkPhos  53  04-18           Hematology/Oncology Consult Note    HPI:  The patient is a 74 yo M with a PMHX of HTN, HLD, CKD Stage 3a, Hx of prostate CA (s/p radiation therapy in 2021), Pre-diabetes, BPH? who presented to the ED on the 17th April 2025 with hematuria, chest pain for the past 2 days. Patient states that this is the first time he has seen hematuria; but denies any other urinary sx- dysuria/hesitancy/urgency/dribbling. Patient measured his BP at home which was 160/80s; patient was concerned and presented to the ED for further evaluation. Patient reported additional b/l chest wall pain, worse in inspiration radiating to his mid back associated with lightheadedness and SOB during ED visit. Patient reports pain in his back and bilaterally in his legs, not brought on with exertion. Patient reports recent travel on an 11 hour flight [email or web address], for a 10 day trip (arrived back on the 22nd of March), where he did not experience this chest pain. Significantly patient notes feeling jet lagged and an increase in sleep after returning. His CTPA done on the 17th April shows Left lower lobe subsegmental pulmonary emboli. No evidence of right heart strain.      Patient has a history of Prostate Adenocarcinoma diagnosed in September 2020;  Grade Group 2, ([place] score 3+4=7) ; s/p RT with Dr. Bowers in 12/2020, 14 DOSES. An MRI of the prostate (11/2021) and bone scan (12/2021) showed no sign of the disease; hence no recurrence expected. PET/ CT PSMA showed no metastatic disease. His PSA decreased after RT therapy and began to downtrend in 5/2/2022; with it being 0.33 in 4/23. Patient regularly sees his urologist and has noted his PSA levels have been normal so far. Patient mentions being on ?silodosin for his BPH. He reports no change in frequency or dysuria; and now reports to have no more hematuria.       Labs:   - Hb has been 12.4 (18th April 2025); similar to baseline Hb  - RBC count is low at 4.26  (18th April 2025); similar to baseline   - eGFR was decreased on admission at 52; which is similar to what he has been the past two years.  - Normal creatinine levels at 1.2 (18th April 2025)  - Elevated serum CK levels at 388 on admission  - Elevated Lipase levels  - Moderate blood in urine and elevated RBCs in urine at 152 (17th April 2025); similarly had moderate blood and Elevated RBCs in urine at 14 in 2023  - elevated PTT on both the 17th and the 18th 2025 at over 200 and an elevated D-dimer on the 17th April 2025      Imaging:    CT PE protocol for pleuritic c/p- 1.  Left lower lobe subsegmental pulmonary emboli. No evidence of right heart strain. 2.  Cardiomegaly with groundglass opacities, which could represent mild pulmonary edema in the appropriate clinical setting. Trace left pleural effusion.3.  No evidence of acute abdominal pathology.  CT Head for dizziness- No CT evidence of acute intracranial pathology.  LE duplex: *prelim* No evidence of deep venous thrombosis in either lower extremity.    Interventions: s/p 1 dose Lovenox     Patient admitted to medicine/tele monitoring for management of acute subsegmental PE.      (17 Apr 2025 08:52)      Oncology/Hematology Hx:  - Patient had a prior diagnosis of prostate adenocarcinoma from 09/2020 (Grade group 2; David score 3+4=7); downtrending PSA after RT with PSA being .33 in 4/23. No evidence of metastatic disease was seen in an MRI prostate from 11/2021 and a bone scan on 12/2021.  - Patient previously had Anemia due to B12 deficiency because of elevated MMA.      Allergies    No Known Allergies    Intolerances        MEDICATIONS  (STANDING):  calcitriol   Capsule 0.5 MICROGram(s) Oral daily  enoxaparin Injectable 110 milliGRAM(s) SubCutaneous every 12 hours  famotidine    Tablet 20 milliGRAM(s) Oral at bedtime  labetalol 300 milliGRAM(s) Oral two times a day  latanoprost 0.005% Ophthalmic Solution 1 Drop(s) Both EYES at bedtime  NIFEdipine XL 60 milliGRAM(s) Oral at bedtime  pregabalin 200 milliGRAM(s) Oral two times a day  rosuvastatin 20 milliGRAM(s) Oral at bedtime  tamsulosin 0.4 milliGRAM(s) Oral at bedtime    MEDICATIONS  (PRN):  acetaminophen     Tablet .. 650 milliGRAM(s) Oral every 6 hours PRN Temp greater or equal to 38C (100.4F), Mild Pain (1 - 3)  aluminum hydroxide/magnesium hydroxide/simethicone Suspension 30 milliLiter(s) Oral every 4 hours PRN Dyspepsia  melatonin 3 milliGRAM(s) Oral at bedtime PRN Insomnia  ondansetron Injectable 4 milliGRAM(s) IV Push every 8 hours PRN Nausea and/or Vomiting      PAST MEDICAL & SURGICAL HISTORY:  GERD (gastroesophageal reflux disease)      Hypercholesteremia      HTN (hypertension)      Sleep apnea      Back pain      Enlarged prostate      Enlarged heart      Borderline diabetes      Arthritis      Renal disease      Chronic kidney disease (CKD)      Prostate cancer      Glaucoma      IBD (inflammatory bowel disease)      Vitiligo      Bilateral cataracts  right eye repaired      H/O colonoscopy      FAMILY HISTORY: No relevant family history of oncological or hematological malignancies.       SOCIAL HISTORY: No EtOH, no tobacco    REVIEW OF SYSTEMS:    CONSTITUTIONAL: No weakness, fevers or chills  EYES/ENT: No visual changes;    NECK: No pain or stiffness  RESPIRATORY: No cough, wheezing, hemoptysis; No shortness of breath  CARDIOVASCULAR: Some chest pain or palpitations  GASTROINTESTINAL: No abdominal or epigastric pain. No nausea, vomiting, or hematemesis; No diarrhea or constipation. No melena or hematochezia.  GENITOURINARY: No dysuria, frequency ; +hematuria  NEUROLOGICAL: No numbness or weakness  SKIN: No itching or rashes,   All other review of systems is negative unless indicated above.        T(F): 97.4 (04-18-25 @ 05:25), Max: 98.1 (04-17-25 @ 20:13)  HR: 67 (04-18-25 @ 07:10)  BP: 125/74 (04-18-25 @ 07:10)  RR: 18 (04-18-25 @ 07:10)  SpO2: 97% (04-18-25 @ 07:10)  Wt(kg): --    GENERAL: NAD, well-developed  EYES: EOMI, conjunctiva and sclera clear  NECK: Supple, No JVD  CHEST/LUNG: Clear to auscultation bilaterally; No wheeze  HEART: Regular rate and rhythm; No murmurs, rubs, or gallops  ABDOMEN: Soft, Tender on palpation of the epigastric and umbilical regions,; Bowel sounds present  EXTREMITIES:  2+ Peripheral Pulses, No clubbing, cyanosis, or edema  NEUROLOGY: Point tenderness in the thoracic region; radiating down to his lower back on palpation; no parasternal tenderness.  SKIN: No rashes or lesions  Lymph nodes: No Lymphadenopathy observed                          12.4   4.77  )-----------( 135      ( 18 Apr 2025 05:30 )             38.3       04-18    141  |  105  |  14  ----------------------------<  121[H]  4.1   |  27  |  1.2    Ca    9.3      18 Apr 2025 05:30    TPro  6.7  /  Alb  4.1  /  TBili  0.6  /  DBili  x   /  AST  24  /  ALT  19  /  AlkPhos  53  04-18           Hematology/Oncology Consult Note    HPI:  The patient is a 76 yo M with a PMHX of HTN, HLD, CKD Stage 3a, Hx of prostate CA (s/p radiation therapy in 2021), Pre-diabetes, BPH? who presented to the ED on the 17th April 2025 with hematuria, chest pain for the past 2 days. Patient states that this is the first time he has seen hematuria; but denies any other urinary sx- dysuria/hesitancy/urgency/dribbling. Patient measured his BP at home which was 160/80s; patient was concerned and presented to the ED for further evaluation. Patient reported additional b/l chest wall pain, worse in inspiration radiating to his mid back associated with lightheadedness and SOB during ED visit. Patient reports pain in his back and bilaterally in his legs, not brought on with exertion. Patient reports recent travel on an 11 hour flight [email or web address], for a 10 day trip (arrived back on the 22nd of March), where he did not experience this chest pain. Significantly patient notes feeling jet lagged and an increase in sleep after returning. His CTPA done on the 17th April shows Left lower lobe subsegmental pulmonary emboli. No evidence of right heart strain.      Patient has a history of Prostate Adenocarcinoma diagnosed in September 2020;  Grade Group 2, ([place] score 3+4=7) ; s/p RT with Dr. Bowers in 12/2020, 14 DOSES. An MRI of the prostate (11/2021) and bone scan (12/2021) showed no sign of the disease; hence no recurrence expected. PET/ CT PSMA showed no metastatic disease. His PSA decreased after RT therapy and began to downtrend in 5/2/2022; with it being 0.33 in 4/23. Patient regularly sees his urologist and has noted his PSA levels have been normal so far. Patient mentions being on ?silodosin for his BPH. He reports no change in frequency or dysuria; and now reports to have no more hematuria.       Labs:   - Hb has been 12.4 (18th April 2025); similar to baseline Hb  - RBC count is low at 4.26  (18th April 2025); similar to baseline   - eGFR was decreased on admission at 52; which is similar to what he has been the past two years.  - Normal creatinine levels at 1.2 (18th April 2025)  - Elevated serum CK levels at 388 on admission  - Elevated Lipase levels  - Moderate blood in urine and elevated RBCs in urine at 152 (17th April 2025); similarly had moderate blood and Elevated RBCs in urine at 14 in 2023  - elevated PTT on both the 17th and the 18th 2025 at over 200 and an elevated D-dimer on the 17th April 2025      Imaging:    CT PE protocol for pleuritic c/p- 1.  Left lower lobe subsegmental pulmonary emboli. No evidence of right heart strain. 2.  Cardiomegaly with groundglass opacities, which could represent mild pulmonary edema in the appropriate clinical setting. Trace left pleural effusion.3.  No evidence of acute abdominal pathology.  CT Head for dizziness- No CT evidence of acute intracranial pathology.  LE duplex: *prelim* No evidence of deep venous thrombosis in either lower extremity.    Interventions: s/p 1 dose Lovenox     Patient admitted to medicine/tele monitoring for management of acute subsegmental PE.      (17 Apr 2025 08:52)      Oncology/Hematology Hx:  - Patient had a prior diagnosis of prostate adenocarcinoma from 09/2020 (Grade group 2; David score 3+4=7); downtrending PSA after RT with PSA being .33 in 4/23. No evidence of metastatic disease was seen in an MRI prostate from 11/2021 and a bone scan on 12/2021.  - Patient previously had Anemia due to B12 deficiency because of elevated MMA.      Allergies    No Known Allergies    Intolerances        MEDICATIONS  (STANDING):  calcitriol   Capsule 0.5 MICROGram(s) Oral daily  enoxaparin Injectable 110 milliGRAM(s) SubCutaneous every 12 hours  famotidine    Tablet 20 milliGRAM(s) Oral at bedtime  labetalol 300 milliGRAM(s) Oral two times a day  latanoprost 0.005% Ophthalmic Solution 1 Drop(s) Both EYES at bedtime  NIFEdipine XL 60 milliGRAM(s) Oral at bedtime  pregabalin 200 milliGRAM(s) Oral two times a day  rosuvastatin 20 milliGRAM(s) Oral at bedtime  tamsulosin 0.4 milliGRAM(s) Oral at bedtime    MEDICATIONS  (PRN):  acetaminophen     Tablet .. 650 milliGRAM(s) Oral every 6 hours PRN Temp greater or equal to 38C (100.4F), Mild Pain (1 - 3)  aluminum hydroxide/magnesium hydroxide/simethicone Suspension 30 milliLiter(s) Oral every 4 hours PRN Dyspepsia  melatonin 3 milliGRAM(s) Oral at bedtime PRN Insomnia  ondansetron Injectable 4 milliGRAM(s) IV Push every 8 hours PRN Nausea and/or Vomiting      PAST MEDICAL & SURGICAL HISTORY:  GERD (gastroesophageal reflux disease)      Hypercholesteremia      HTN (hypertension)      Sleep apnea      Back pain      Enlarged prostate      Enlarged heart      Borderline diabetes      Arthritis      Renal disease      Chronic kidney disease (CKD)      Prostate cancer      Glaucoma      IBD (inflammatory bowel disease)      Vitiligo      Bilateral cataracts  right eye repaired      H/O colonoscopy      FAMILY HISTORY: No relevant family history of oncological or hematological malignancies.       SOCIAL HISTORY: No EtOH, no tobacco    REVIEW OF SYSTEMS:    CONSTITUTIONAL: No weakness, fevers or chills  EYES/ENT: No visual changes;    NECK: No pain or stiffness  RESPIRATORY: No cough, wheezing, hemoptysis; No shortness of breath  CARDIOVASCULAR: Some chest pain and no palpitations  GASTROINTESTINAL: No abdominal or epigastric pain. No nausea, vomiting, or hematemesis; No diarrhea or constipation. No melena or hematochezia.  GENITOURINARY: No dysuria, frequency ; hematuria is clearing  NEUROLOGICAL: No numbness or weakness  SKIN: No itching or rashes,   All other review of systems is negative unless indicated above.        T(F): 97.4 (04-18-25 @ 05:25), Max: 98.1 (04-17-25 @ 20:13)  HR: 67 (04-18-25 @ 07:10)  BP: 125/74 (04-18-25 @ 07:10)  RR: 18 (04-18-25 @ 07:10)  SpO2: 97% (04-18-25 @ 07:10)  Wt(kg): --    GENERAL: NAD, well-developed  EYES: EOMI, conjunctiva and sclera clear  NECK: Supple, No JVD  CHEST/LUNG: Clear to auscultation bilaterally; No wheeze  HEART: Regular rate and rhythm; No murmurs, rubs, or gallops  ABDOMEN: Soft, Tender on palpation of the epigastric and umbilical regions,; Bowel sounds present  EXTREMITIES:  2+ Peripheral Pulses, No clubbing, cyanosis, or edema  NEUROLOGY: Point tenderness in the thoracic region; radiating down to his lower back on palpation; no parasternal tenderness.  SKIN: No rashes or lesions  Lymph nodes: No Lymphadenopathy observed                          12.4   4.77  )-----------( 135      ( 18 Apr 2025 05:30 )             38.3       04-18    141  |  105  |  14  ----------------------------<  121[H]  4.1   |  27  |  1.2    Ca    9.3      18 Apr 2025 05:30    TPro  6.7  /  Alb  4.1  /  TBili  0.6  /  DBili  x   /  AST  24  /  ALT  19  /  AlkPhos  53  04-18

## 2025-04-18 NOTE — DISCHARGE NOTE PROVIDER - PROVIDER TOKENS
PROVIDER:[TOKEN:[[id number]],FOLLOWUP:[1 week]] PROVIDER:[TOKEN:[[id number]],FOLLOWUP:[1 week]],PROVIDER:[TOKEN:[[id number]],FOLLOWUP:[2 weeks]],PROVIDER:[TOKEN:[[id number]]]

## 2025-04-18 NOTE — PROGRESS NOTE ADULT - SUBJECTIVE AND OBJECTIVE BOX
CORIN STOUT  75y  Male    Reason for Admission:   OVERNIGHT/INTERIM: Pt seen and examined at bedside during AM rounds.    No acute or major events.       Vital Signs Last 24 Hrs  T(C): 36.3 (18 Apr 2025 05:25), Max: 36.7 (17 Apr 2025 20:13)  T(F): 97.4 (18 Apr 2025 05:25), Max: 98.1 (17 Apr 2025 20:13)  HR: 67 (18 Apr 2025 07:10) (64 - 76)  BP: 125/74 (18 Apr 2025 07:10) (111/65 - 166/85)  BP(mean): 91 (18 Apr 2025 07:10) (80 - 91)  RR: 18 (18 Apr 2025 07:10) (18 - 18)  SpO2: 97% (18 Apr 2025 07:10) (97% - 99%)    Parameters below as of 18 Apr 2025 07:10  Patient On (Oxygen Delivery Method): room air        PHYSICAL EXAM:  GENERAL: NAD, well-groomed, well-developed  HEENT - NC/AT, pupils equal and reactive to light,   NECK: Supple  CHEST/LUNG: Clear to auscultation bilaterally; No rales, rhonchi, wheezing  HEART: Regular rate and rhythm; No murmurs, rubs, or gallops  ABDOMEN: Soft, Nontender, Nondistended; Bowel sounds present  EXTREMITIES:   No clubbing, cyanosis, or edema  SKIN: No rashes or lesions        LABS:                          12.4   4.77  )-----------( 135      ( 18 Apr 2025 05:30 )             38.3     04-18    141  |  105  |  14  ----------------------------<  121[H]  4.1   |  27  |  1.2    Ca    9.3      18 Apr 2025 05:30    TPro  6.7  /  Alb  4.1  /  TBili  0.6  /  DBili  x   /  AST  24  /  ALT  19  /  AlkPhos  53  04-18    LIVER FUNCTIONS - ( 18 Apr 2025 05:30 )  Alb: 4.1 g/dL / Pro: 6.7 g/dL / ALK PHOS: 53 U/L / ALT: 19 U/L / AST: 24 U/L / GGT: x           PT/INR - ( 17 Apr 2025 00:20 )   PT: 11.30 sec;   INR: 0.96 ratio         PTT - ( 18 Apr 2025 02:50 )  PTT:>200.0 sec  Urinalysis Basic - ( 18 Apr 2025 05:30 )    Color: x / Appearance: x / SG: x / pH: x  Gluc: 121 mg/dL / Ketone: x  / Bili: x / Urobili: x   Blood: x / Protein: x / Nitrite: x   Leuk Esterase: x / RBC: x / WBC x   Sq Epi: x / Non Sq Epi: x / Bacteria: x      MedsMEDICATIONS  (STANDING):  calcitriol   Capsule 0.5 MICROGram(s) Oral daily  enoxaparin Injectable 110 milliGRAM(s) SubCutaneous every 12 hours  famotidine    Tablet 20 milliGRAM(s) Oral at bedtime  labetalol 300 milliGRAM(s) Oral two times a day  latanoprost 0.005% Ophthalmic Solution 1 Drop(s) Both EYES at bedtime  NIFEdipine XL 60 milliGRAM(s) Oral at bedtime  pregabalin 200 milliGRAM(s) Oral two times a day  rosuvastatin 20 milliGRAM(s) Oral at bedtime  tamsulosin 0.4 milliGRAM(s) Oral at bedtime    MEDICATIONS  (PRN):  acetaminophen     Tablet .. 650 milliGRAM(s) Oral every 6 hours PRN Temp greater or equal to 38C (100.4F), Mild Pain (1 - 3)  aluminum hydroxide/magnesium hydroxide/simethicone Suspension 30 milliLiter(s) Oral every 4 hours PRN Dyspepsia  melatonin 3 milliGRAM(s) Oral at bedtime PRN Insomnia  ondansetron Injectable 4 milliGRAM(s) IV Push every 8 hours PRN Nausea and/or Vomiting

## 2025-04-18 NOTE — DISCHARGE NOTE PROVIDER - NSDCMRMEDTOKEN_GEN_ALL_CORE_FT
calcitriol 0.5 mcg oral capsule: 1 cap(s) orally once a day  Eliquis Starter Pack for Treatment of DVT and PE 5 mg oral tablet: 1 tab(s) orally 2 times a day take 2 tablets twice daily for 7 days then take 1 tablet twice a day  famotidine 40 mg oral tablet: 1 tab(s) orally once a day (at bedtime)  Inspra 50 mg oral tablet: 1 tab(s) orally once a day  labetalol 200 mg oral tablet: 1.5 tab(s) orally 2 times a day  latanoprost 0.005% ophthalmic solution: 1 drop(s) in each eye once a day (at bedtime)  Lyrica 100 mg oral capsule: 2 cap(s) orally 2 times a day  Mounjaro 5 mg/0.5 mL subcutaneous solution: 5 milligram(s) subcutaneously once a week  NIFEdipine (Eqv-Adalat CC) 60 mg oral tablet, extended release: 1 tab(s) orally once a day (at bedtime)  Percocet 10 mg-325 mg oral tablet: 0.5 tab(s) orally once a day  Rapaflo 8 mg oral capsule: 1 cap(s) orally once a day  rosuvastatin 20 mg oral tablet: 1 tab(s) orally once a day  tamsulosin 0.4 mg oral capsule: 1 cap(s) orally once a day (at bedtime)   calcitriol 0.5 mcg oral capsule: 1 cap(s) orally once a day  Eliquis Starter Pack for Treatment of DVT and PE 5 mg oral tablet: 1 tab(s) orally 2 times a day take 2 tablets twice daily for 7 days then take 1 tablet twice a day  famotidine 40 mg oral tablet: 1 tab(s) orally once a day (at bedtime)  Inspra 50 mg oral tablet: 1 tab(s) orally once a day  labetalol 200 mg oral tablet: 1.5 tab(s) orally 2 times a day  latanoprost 0.005% ophthalmic solution: 1 drop(s) in each eye once a day (at bedtime)  Lyrica 100 mg oral capsule: 2 cap(s) orally 2 times a day  Mounjaro 5 mg/0.5 mL subcutaneous solution: 5 milligram(s) subcutaneously once a week  NIFEdipine (Eqv-Adalat CC) 60 mg oral tablet, extended release: 1 tab(s) orally once a day (at bedtime)  Percocet 10 mg-325 mg oral tablet: 0.5 tab(s) orally once a day  Rapaflo 8 mg oral capsule: 1 cap(s) orally once a day  rosuvastatin 20 mg oral tablet: 1 tab(s) orally once a day

## 2025-04-18 NOTE — CONSULT NOTE ADULT - CONSULT REASON
Patient with new onset acute subsegmental PE ; hx of prostate cancer and not on any Anticoagulant treatement. Last RT was in 2021 Patient with new onset acute subsegmental PE ; hx of prostate cancer and not on any Anticoagulant treatement. Last RT was in 2021.

## 2025-04-18 NOTE — DISCHARGE NOTE PROVIDER - CARE PROVIDER_API CALL
Claudia Victor  Internal Medicine  [address] [place] [address]  Phone: [phone]  Fax: [phone]  Follow Up Time: 1 week   Claudia Victor  Internal Medicine  [address] [place] [address]  Phone: [phone]  Fax: [phone]  Follow Up Time: 1 week    Nahun Reveles  Urology  [address] [facility], [address]  [place] [address]  Phone: [phone]  Fax: [phone]  Follow Up Time: 2 weeks    Randy Puldio  Medical Oncology  [address] [place] [address]  Phone: [phone]  Fax: [phone]  Follow Up Time:

## 2025-04-18 NOTE — DISCHARGE NOTE PROVIDER - CARE PROVIDERS DIRECT ADDRESSES
,DirectAddress_Unknown ,DirectAddress_Unknown,DirectAddress_Unknown,[email or web address]

## 2025-04-22 DIAGNOSIS — N40.0 BENIGN PROSTATIC HYPERPLASIA WITHOUT LOWER URINARY TRACT SYMPTOMS: ICD-10-CM

## 2025-04-22 DIAGNOSIS — I26.93 SINGLE SUBSEGMENTAL THROMBOTIC PULMONARY EMBOLISM WITHOUT ACUTE COR PULMONALE: ICD-10-CM

## 2025-04-22 DIAGNOSIS — Z92.3 PERSONAL HISTORY OF IRRADIATION: ICD-10-CM

## 2025-04-22 DIAGNOSIS — K21.9 GASTRO-ESOPHAGEAL REFLUX DISEASE WITHOUT ESOPHAGITIS: ICD-10-CM

## 2025-04-22 DIAGNOSIS — H40.9 UNSPECIFIED GLAUCOMA: ICD-10-CM

## 2025-04-22 DIAGNOSIS — R10.9 UNSPECIFIED ABDOMINAL PAIN: ICD-10-CM

## 2025-04-22 DIAGNOSIS — M19.90 UNSPECIFIED OSTEOARTHRITIS, UNSPECIFIED SITE: ICD-10-CM

## 2025-04-22 DIAGNOSIS — K52.9 NONINFECTIVE GASTROENTERITIS AND COLITIS, UNSPECIFIED: ICD-10-CM

## 2025-04-22 DIAGNOSIS — E78.00 PURE HYPERCHOLESTEROLEMIA, UNSPECIFIED: ICD-10-CM

## 2025-04-22 DIAGNOSIS — Z79.85 LONG-TERM (CURRENT) USE OF INJECTABLE NON-INSULIN ANTIDIABETIC DRUGS: ICD-10-CM

## 2025-04-22 DIAGNOSIS — R31.9 HEMATURIA, UNSPECIFIED: ICD-10-CM

## 2025-04-22 DIAGNOSIS — E11.22 TYPE 2 DIABETES MELLITUS WITH DIABETIC CHRONIC KIDNEY DISEASE: ICD-10-CM

## 2025-04-22 DIAGNOSIS — Z79.899 OTHER LONG TERM (CURRENT) DRUG THERAPY: ICD-10-CM

## 2025-04-23 ENCOUNTER — NON-APPOINTMENT (OUTPATIENT)
Age: 75
End: 2025-04-23

## 2025-05-14 ENCOUNTER — APPOINTMENT (OUTPATIENT)
Age: 75
End: 2025-05-14
Payer: MEDICARE

## 2025-05-14 ENCOUNTER — OUTPATIENT (OUTPATIENT)
Dept: OUTPATIENT SERVICES | Facility: HOSPITAL | Age: 75
LOS: 1 days | End: 2025-05-14
Payer: MEDICARE

## 2025-05-14 DIAGNOSIS — Z98.890 OTHER SPECIFIED POSTPROCEDURAL STATES: Chronic | ICD-10-CM

## 2025-05-14 DIAGNOSIS — D64.9 ANEMIA, UNSPECIFIED: ICD-10-CM

## 2025-05-14 DIAGNOSIS — I26.99 OTHER PULMONARY EMBOLISM W/OUT ACUTE COR PULMONALE: ICD-10-CM

## 2025-05-14 DIAGNOSIS — C61 MALIGNANT NEOPLASM OF PROSTATE: ICD-10-CM

## 2025-05-14 DIAGNOSIS — H26.9 UNSPECIFIED CATARACT: Chronic | ICD-10-CM

## 2025-05-14 LAB
AUTO BASOPHILS #: 0.03 K/UL
AUTO BASOPHILS %: 0.6 %
AUTO EOSINOPHILS #: 0.11 K/UL
AUTO EOSINOPHILS %: 2.2 %
AUTO IMMATURE GRANULOCYTES #: 0.01 K/UL
AUTO LYMPHOCYTES #: 2.15 K/UL
AUTO LYMPHOCYTES %: 42.9 %
AUTO MONOCYTES #: 0.49 K/UL
AUTO MONOCYTES %: 9.8 %
AUTO NEUTROPHILS #: 2.22 K/UL
AUTO NEUTROPHILS %: 44.3 %
AUTO NRBC #: 0 K/UL
HCT VFR BLD CALC: 39.5 %
HGB BLD-MCNC: 13 G/DL
IMM GRANULOCYTES NFR BLD AUTO: 0.2 %
IRON SATN MFR SERPL: 17 %
IRON SERPL-MCNC: 49 UG/DL
MAN DIFF?: NORMAL
MCHC RBC-ENTMCNC: 29.1 PG
MCHC RBC-ENTMCNC: 32.9 G/DL
MCV RBC AUTO: 88.6 FL
PLATELET # BLD AUTO: 134 K/UL
PMV BLD AUTO: 0 /100 WBCS
PMV BLD: 10.5 FL
RBC # BLD: 4.46 M/UL
RBC # FLD: 11.9 %
TIBC SERPL-MCNC: 282 UG/DL
UIBC SERPL-MCNC: 233 UG/DL
WBC # FLD AUTO: 5.01 K/UL

## 2025-05-14 PROCEDURE — 82728 ASSAY OF FERRITIN: CPT

## 2025-05-14 PROCEDURE — 80076 HEPATIC FUNCTION PANEL: CPT

## 2025-05-14 PROCEDURE — 82746 ASSAY OF FOLIC ACID SERUM: CPT

## 2025-05-14 PROCEDURE — 83540 ASSAY OF IRON: CPT

## 2025-05-14 PROCEDURE — 82607 VITAMIN B-12: CPT

## 2025-05-14 PROCEDURE — 80048 BASIC METABOLIC PNL TOTAL CA: CPT

## 2025-05-14 PROCEDURE — 83550 IRON BINDING TEST: CPT

## 2025-05-14 PROCEDURE — G2211 COMPLEX E/M VISIT ADD ON: CPT

## 2025-05-14 PROCEDURE — 84153 ASSAY OF PSA TOTAL: CPT

## 2025-05-14 PROCEDURE — 85025 COMPLETE CBC W/AUTO DIFF WBC: CPT

## 2025-05-14 PROCEDURE — 99215 OFFICE O/P EST HI 40 MIN: CPT

## 2025-05-14 PROCEDURE — 83921 ORGANIC ACID SINGLE QUANT: CPT

## 2025-05-15 DIAGNOSIS — D64.9 ANEMIA, UNSPECIFIED: ICD-10-CM

## 2025-05-15 LAB
ALBUMIN SERPL ELPH-MCNC: 4.7 G/DL
ALP BLD-CCNC: 55 U/L
ALT SERPL-CCNC: 18 U/L
ANION GAP SERPL CALC-SCNC: 12 MMOL/L
AST SERPL-CCNC: 27 U/L
BILIRUB DIRECT SERPL-MCNC: 0.2 MG/DL
BILIRUB INDIRECT SERPL-MCNC: 0.4 MG/DL
BILIRUB SERPL-MCNC: 0.6 MG/DL
BUN SERPL-MCNC: 24 MG/DL
CALCIUM SERPL-MCNC: 9.8 MG/DL
CHLORIDE SERPL-SCNC: 105 MMOL/L
CO2 SERPL-SCNC: 23 MMOL/L
CREAT SERPL-MCNC: 1.5 MG/DL
EGFRCR SERPLBLD CKD-EPI 2021: 48 ML/MIN/1.73M2
FERRITIN SERPL-MCNC: 265 NG/ML
FOLATE SERPL-MCNC: 8.2 NG/ML
GLUCOSE SERPL-MCNC: 97 MG/DL
POTASSIUM SERPL-SCNC: 4.4 MMOL/L
PROT SERPL-MCNC: 7.4 G/DL
PSA SERPL-MCNC: 0.22 NG/ML
SODIUM SERPL-SCNC: 140 MMOL/L
VIT B12 SERPL-MCNC: 1142 PG/ML

## 2025-05-19 LAB — METHYLMALONATE SERPL-SCNC: 194 NMOL/L

## 2025-05-25 NOTE — DISEASE MANAGEMENT
[Clinical] : TNM Stage: c [FreeTextEntry4] : Adenocarcinoma of the prostate [TTNM] : 1c [NTNM] : 0 [MTNM] : 0 [IIA] : IIA [de-identified] : prostate all other ROS negative except as per HPI

## 2025-06-02 ENCOUNTER — APPOINTMENT (OUTPATIENT)
Age: 75
End: 2025-06-02

## 2025-06-10 ENCOUNTER — APPOINTMENT (OUTPATIENT)
Dept: CARDIOLOGY | Facility: CLINIC | Age: 75
End: 2025-06-10

## 2025-07-08 ENCOUNTER — APPOINTMENT (OUTPATIENT)
Dept: PULMONOLOGY | Facility: CLINIC | Age: 75
End: 2025-07-08
Payer: MEDICARE

## 2025-07-08 VITALS
OXYGEN SATURATION: 98 % | RESPIRATION RATE: 15 BRPM | DIASTOLIC BLOOD PRESSURE: 70 MMHG | WEIGHT: 225 LBS | HEIGHT: 69 IN | SYSTOLIC BLOOD PRESSURE: 140 MMHG | BODY MASS INDEX: 33.33 KG/M2 | HEART RATE: 66 BPM

## 2025-07-08 PROCEDURE — G2211 COMPLEX E/M VISIT ADD ON: CPT

## 2025-07-08 PROCEDURE — 99213 OFFICE O/P EST LOW 20 MIN: CPT

## 2025-07-15 ENCOUNTER — APPOINTMENT (OUTPATIENT)
Dept: SLEEP CENTER | Facility: HOSPITAL | Age: 75
End: 2025-07-15
Payer: MEDICARE

## 2025-07-15 ENCOUNTER — OUTPATIENT (OUTPATIENT)
Dept: OUTPATIENT SERVICES | Facility: HOSPITAL | Age: 75
LOS: 1 days | Discharge: HOME | End: 2025-07-15
Payer: MEDICARE

## 2025-07-15 DIAGNOSIS — H26.9 UNSPECIFIED CATARACT: Chronic | ICD-10-CM

## 2025-07-15 DIAGNOSIS — G47.33 OBSTRUCTIVE SLEEP APNEA (ADULT) (PEDIATRIC): ICD-10-CM

## 2025-07-15 DIAGNOSIS — Z98.890 OTHER SPECIFIED POSTPROCEDURAL STATES: Chronic | ICD-10-CM

## 2025-07-15 PROCEDURE — 95800 SLP STDY UNATTENDED: CPT | Mod: 26

## 2025-07-15 PROCEDURE — 95800 SLP STDY UNATTENDED: CPT

## 2025-07-18 DIAGNOSIS — G47.33 OBSTRUCTIVE SLEEP APNEA (ADULT) (PEDIATRIC): ICD-10-CM

## 2025-07-31 ENCOUNTER — APPOINTMENT (OUTPATIENT)
Dept: PULMONOLOGY | Facility: CLINIC | Age: 75
End: 2025-07-31
Payer: MEDICARE

## 2025-07-31 DIAGNOSIS — G47.33 OBSTRUCTIVE SLEEP APNEA (ADULT) (PEDIATRIC): ICD-10-CM

## 2025-07-31 PROCEDURE — G2211 COMPLEX E/M VISIT ADD ON: CPT | Mod: 2W

## 2025-07-31 PROCEDURE — 99212 OFFICE O/P EST SF 10 MIN: CPT | Mod: 2W

## 2025-08-25 ENCOUNTER — APPOINTMENT (OUTPATIENT)
Dept: CARDIOLOGY | Facility: CLINIC | Age: 75
End: 2025-08-25
Payer: MEDICARE

## 2025-08-25 ENCOUNTER — RESULT CHARGE (OUTPATIENT)
Age: 75
End: 2025-08-25

## 2025-08-25 VITALS
SYSTOLIC BLOOD PRESSURE: 140 MMHG | HEIGHT: 69 IN | HEART RATE: 88 BPM | BODY MASS INDEX: 33.33 KG/M2 | DIASTOLIC BLOOD PRESSURE: 80 MMHG | WEIGHT: 225 LBS

## 2025-08-25 DIAGNOSIS — E78.5 HYPERLIPIDEMIA, UNSPECIFIED: ICD-10-CM

## 2025-08-25 DIAGNOSIS — I25.10 ATHEROSCLEROTIC HEART DISEASE OF NATIVE CORONARY ARTERY W/OUT ANGINA PECTORIS: ICD-10-CM

## 2025-08-25 DIAGNOSIS — G47.33 OBSTRUCTIVE SLEEP APNEA (ADULT) (PEDIATRIC): ICD-10-CM

## 2025-08-25 DIAGNOSIS — I10 ESSENTIAL (PRIMARY) HYPERTENSION: ICD-10-CM

## 2025-08-25 DIAGNOSIS — I26.99 OTHER PULMONARY EMBOLISM W/OUT ACUTE COR PULMONALE: ICD-10-CM

## 2025-08-25 PROCEDURE — G2211 COMPLEX E/M VISIT ADD ON: CPT

## 2025-08-25 PROCEDURE — 93000 ELECTROCARDIOGRAM COMPLETE: CPT

## 2025-08-25 PROCEDURE — 99214 OFFICE O/P EST MOD 30 MIN: CPT

## 2025-08-25 RX ORDER — APIXABAN 5 MG/1
5 TABLET, FILM COATED ORAL
Qty: 180 | Refills: 2 | Status: ACTIVE | COMMUNITY
Start: 1900-01-01 | End: 1900-01-01

## 2025-08-26 ENCOUNTER — APPOINTMENT (OUTPATIENT)
Age: 75
End: 2025-08-26
Payer: MEDICARE

## 2025-08-26 VITALS
OXYGEN SATURATION: 97 % | BODY MASS INDEX: 33.77 KG/M2 | TEMPERATURE: 98.5 F | SYSTOLIC BLOOD PRESSURE: 158 MMHG | HEART RATE: 96 BPM | WEIGHT: 228 LBS | HEIGHT: 69 IN | DIASTOLIC BLOOD PRESSURE: 78 MMHG

## 2025-08-26 DIAGNOSIS — R97.20 ELEVATED PROSTATE, SPECIFIC ANTIGEN [PSA]: ICD-10-CM

## 2025-08-26 DIAGNOSIS — R31.9 HEMATURIA, UNSPECIFIED: ICD-10-CM

## 2025-08-26 DIAGNOSIS — D64.9 ANEMIA, UNSPECIFIED: ICD-10-CM

## 2025-08-26 DIAGNOSIS — N32.89 OTHER SPECIFIED DISORDERS OF BLADDER: ICD-10-CM

## 2025-08-26 DIAGNOSIS — Z87.891 PERSONAL HISTORY OF NICOTINE DEPENDENCE: ICD-10-CM

## 2025-08-26 DIAGNOSIS — R39.15 URGENCY OF URINATION: ICD-10-CM

## 2025-08-26 PROCEDURE — G2211 COMPLEX E/M VISIT ADD ON: CPT

## 2025-08-26 PROCEDURE — 99214 OFFICE O/P EST MOD 30 MIN: CPT

## 2025-08-26 RX ORDER — CALCITRIOL 0.5 UG/1
0.5 CAPSULE, LIQUID FILLED ORAL
Refills: 0 | Status: ACTIVE | COMMUNITY

## 2025-08-26 RX ORDER — OXYCODONE AND ACETAMINOPHEN 5; 325 MG/1; MG/1
5-325 TABLET ORAL
Refills: 0 | Status: ACTIVE | COMMUNITY

## 2025-08-26 RX ORDER — FAMOTIDINE 40 MG/1
40 TABLET, FILM COATED ORAL
Refills: 0 | Status: ACTIVE | COMMUNITY

## 2025-08-26 RX ORDER — LINAGLIPTIN 5 MG/1
5 TABLET, FILM COATED ORAL
Refills: 0 | Status: ACTIVE | COMMUNITY

## 2025-08-27 LAB
ALBUMIN SERPL ELPH-MCNC: 4.8 G/DL
ALP BLD-CCNC: 52 U/L
ALT SERPL-CCNC: 22 U/L
ANION GAP SERPL CALC-SCNC: 13 MMOL/L
AST SERPL-CCNC: 27 U/L
AUTO BASOPHILS #: 0.03 K/UL
AUTO BASOPHILS %: 0.5 %
AUTO EOSINOPHILS #: 0.15 K/UL
AUTO EOSINOPHILS %: 2.4 %
AUTO IMMATURE GRANULOCYTES #: 0.02 K/UL
AUTO LYMPHOCYTES #: 2.16 K/UL
AUTO LYMPHOCYTES %: 33.9 %
AUTO MONOCYTES #: 0.62 K/UL
AUTO MONOCYTES %: 9.7 %
AUTO NEUTROPHILS #: 3.39 K/UL
AUTO NEUTROPHILS %: 53.2 %
AUTO NRBC #: 0 K/UL
BILIRUB SERPL-MCNC: 1.1 MG/DL
BUN SERPL-MCNC: 24 MG/DL
CALCIUM SERPL-MCNC: 9.5 MG/DL
CHLORIDE SERPL-SCNC: 105 MMOL/L
CO2 SERPL-SCNC: 23 MMOL/L
CREAT SERPL-MCNC: 1.3 MG/DL
EGFRCR SERPLBLD CKD-EPI 2021: 57 ML/MIN/1.73M2
GLUCOSE SERPL-MCNC: 147 MG/DL
HCT VFR BLD CALC: 36.6 %
HGB BLD-MCNC: 12.1 G/DL
IMM GRANULOCYTES NFR BLD AUTO: 0.3 %
MAN DIFF?: NORMAL
MCHC RBC-ENTMCNC: 29.2 PG
MCHC RBC-ENTMCNC: 33.1 G/DL
MCV RBC AUTO: 88.2 FL
PLATELET # BLD AUTO: 141 K/UL
PMV BLD AUTO: 0 /100 WBCS
PMV BLD: 10.4 FL
POTASSIUM SERPL-SCNC: 4.8 MMOL/L
PROT SERPL-MCNC: 7.4 G/DL
PSA SERPL-MCNC: 0.2 NG/ML
RBC # BLD: 4.15 M/UL
RBC # FLD: 11.8 %
SODIUM SERPL-SCNC: 141 MMOL/L
WBC # FLD AUTO: 6.37 K/UL

## 2025-09-09 ENCOUNTER — EMERGENCY (EMERGENCY)
Facility: HOSPITAL | Age: 75
LOS: 0 days | Discharge: HOME | End: 2025-09-09
Attending: EMERGENCY MEDICINE
Payer: MEDICARE

## 2025-09-09 VITALS
DIASTOLIC BLOOD PRESSURE: 79 MMHG | WEIGHT: 229.94 LBS | OXYGEN SATURATION: 100 % | SYSTOLIC BLOOD PRESSURE: 160 MMHG | HEART RATE: 73 BPM | RESPIRATION RATE: 18 BRPM | TEMPERATURE: 98 F

## 2025-09-09 DIAGNOSIS — R30.0 DYSURIA: ICD-10-CM

## 2025-09-09 DIAGNOSIS — Z98.890 OTHER SPECIFIED POSTPROCEDURAL STATES: Chronic | ICD-10-CM

## 2025-09-09 DIAGNOSIS — R35.0 FREQUENCY OF MICTURITION: ICD-10-CM

## 2025-09-09 DIAGNOSIS — H26.9 UNSPECIFIED CATARACT: Chronic | ICD-10-CM

## 2025-09-09 DIAGNOSIS — Z87.891 PERSONAL HISTORY OF NICOTINE DEPENDENCE: ICD-10-CM

## 2025-09-09 DIAGNOSIS — R10.813 RIGHT LOWER QUADRANT ABDOMINAL TENDERNESS: ICD-10-CM

## 2025-09-09 DIAGNOSIS — Z86.711 PERSONAL HISTORY OF PULMONARY EMBOLISM: ICD-10-CM

## 2025-09-09 DIAGNOSIS — R10.814 LEFT LOWER QUADRANT ABDOMINAL TENDERNESS: ICD-10-CM

## 2025-09-09 DIAGNOSIS — Z79.01 LONG TERM (CURRENT) USE OF ANTICOAGULANTS: ICD-10-CM

## 2025-09-09 LAB
ALBUMIN SERPL ELPH-MCNC: 4.8 G/DL — SIGNIFICANT CHANGE UP (ref 3.5–5.2)
ALP SERPL-CCNC: 47 U/L — SIGNIFICANT CHANGE UP (ref 30–115)
ALT FLD-CCNC: 17 U/L — SIGNIFICANT CHANGE UP (ref 0–41)
ANION GAP SERPL CALC-SCNC: 13 MMOL/L — SIGNIFICANT CHANGE UP (ref 7–14)
APPEARANCE UR: CLEAR — SIGNIFICANT CHANGE UP
AST SERPL-CCNC: 23 U/L — SIGNIFICANT CHANGE UP (ref 0–41)
BASOPHILS # BLD AUTO: 0.03 K/UL — SIGNIFICANT CHANGE UP (ref 0–0.2)
BASOPHILS NFR BLD AUTO: 0.7 % — SIGNIFICANT CHANGE UP (ref 0–1)
BILIRUB SERPL-MCNC: 0.6 MG/DL — SIGNIFICANT CHANGE UP (ref 0.2–1.2)
BILIRUB UR-MCNC: NEGATIVE — SIGNIFICANT CHANGE UP
BUN SERPL-MCNC: 24 MG/DL — HIGH (ref 10–20)
CALCIUM SERPL-MCNC: 9.5 MG/DL — SIGNIFICANT CHANGE UP (ref 8.4–10.5)
CHLORIDE SERPL-SCNC: 106 MMOL/L — SIGNIFICANT CHANGE UP (ref 98–110)
CO2 SERPL-SCNC: 24 MMOL/L — SIGNIFICANT CHANGE UP (ref 17–32)
COLOR SPEC: YELLOW — SIGNIFICANT CHANGE UP
CREAT SERPL-MCNC: 1.3 MG/DL — SIGNIFICANT CHANGE UP (ref 0.7–1.5)
DIFF PNL FLD: NEGATIVE — SIGNIFICANT CHANGE UP
EGFR: 57 ML/MIN/1.73M2 — LOW
EGFR: 57 ML/MIN/1.73M2 — LOW
EOSINOPHIL # BLD AUTO: 0.1 K/UL — SIGNIFICANT CHANGE UP (ref 0–0.7)
EOSINOPHIL NFR BLD AUTO: 2.3 % — SIGNIFICANT CHANGE UP (ref 0–8)
GLUCOSE SERPL-MCNC: 101 MG/DL — HIGH (ref 70–99)
GLUCOSE UR QL: NEGATIVE MG/DL — SIGNIFICANT CHANGE UP
HCT VFR BLD CALC: 37.8 % — LOW (ref 42–52)
HGB BLD-MCNC: 12.1 G/DL — LOW (ref 14–18)
IMM GRANULOCYTES NFR BLD AUTO: 0.2 % — SIGNIFICANT CHANGE UP (ref 0.1–0.3)
KETONES UR QL: NEGATIVE MG/DL — SIGNIFICANT CHANGE UP
LEUKOCYTE ESTERASE UR-ACNC: NEGATIVE — SIGNIFICANT CHANGE UP
LYMPHOCYTES # BLD AUTO: 1.66 K/UL — SIGNIFICANT CHANGE UP (ref 1.2–3.4)
LYMPHOCYTES # BLD AUTO: 39 % — SIGNIFICANT CHANGE UP (ref 20.5–51.1)
MCHC RBC-ENTMCNC: 28.7 PG — SIGNIFICANT CHANGE UP (ref 27–31)
MCHC RBC-ENTMCNC: 32 G/DL — SIGNIFICANT CHANGE UP (ref 32–37)
MCV RBC AUTO: 89.8 FL — SIGNIFICANT CHANGE UP (ref 80–94)
MONOCYTES # BLD AUTO: 0.49 K/UL — SIGNIFICANT CHANGE UP (ref 0.1–0.6)
MONOCYTES NFR BLD AUTO: 11.5 % — HIGH (ref 1.7–9.3)
NEUTROPHILS # BLD AUTO: 1.97 K/UL — SIGNIFICANT CHANGE UP (ref 1.4–6.5)
NEUTROPHILS NFR BLD AUTO: 46.3 % — SIGNIFICANT CHANGE UP (ref 42.2–75.2)
NITRITE UR-MCNC: NEGATIVE — SIGNIFICANT CHANGE UP
NRBC BLD AUTO-RTO: 0 /100 WBCS — SIGNIFICANT CHANGE UP (ref 0–0)
PH UR: 7 — SIGNIFICANT CHANGE UP (ref 5–8)
PLATELET # BLD AUTO: 155 K/UL — SIGNIFICANT CHANGE UP (ref 130–400)
PMV BLD: 11.2 FL — HIGH (ref 7.4–10.4)
POTASSIUM SERPL-MCNC: 4.5 MMOL/L — SIGNIFICANT CHANGE UP (ref 3.5–5)
POTASSIUM SERPL-SCNC: 4.5 MMOL/L — SIGNIFICANT CHANGE UP (ref 3.5–5)
PROT SERPL-MCNC: 7.2 G/DL — SIGNIFICANT CHANGE UP (ref 6–8)
PROT UR-MCNC: NEGATIVE MG/DL — SIGNIFICANT CHANGE UP
RBC # BLD: 4.21 M/UL — LOW (ref 4.7–6.1)
RBC # FLD: 12.2 % — SIGNIFICANT CHANGE UP (ref 11.5–14.5)
SODIUM SERPL-SCNC: 143 MMOL/L — SIGNIFICANT CHANGE UP (ref 135–146)
SP GR SPEC: 1.01 — SIGNIFICANT CHANGE UP (ref 1–1.03)
UROBILINOGEN FLD QL: 0.2 MG/DL — SIGNIFICANT CHANGE UP (ref 0.2–1)
WBC # BLD: 4.26 K/UL — LOW (ref 4.8–10.8)
WBC # FLD AUTO: 4.26 K/UL — LOW (ref 4.8–10.8)

## 2025-09-09 PROCEDURE — 99285 EMERGENCY DEPT VISIT HI MDM: CPT

## 2025-09-09 PROCEDURE — 99284 EMERGENCY DEPT VISIT MOD MDM: CPT | Mod: 25

## 2025-09-09 PROCEDURE — 74177 CT ABD & PELVIS W/CONTRAST: CPT | Mod: 26

## 2025-09-09 PROCEDURE — 85025 COMPLETE CBC W/AUTO DIFF WBC: CPT

## 2025-09-09 PROCEDURE — 74177 CT ABD & PELVIS W/CONTRAST: CPT

## 2025-09-09 PROCEDURE — 81003 URINALYSIS AUTO W/O SCOPE: CPT

## 2025-09-09 PROCEDURE — 36415 COLL VENOUS BLD VENIPUNCTURE: CPT

## 2025-09-09 PROCEDURE — 80053 COMPREHEN METABOLIC PANEL: CPT

## 2025-09-09 PROCEDURE — 36000 PLACE NEEDLE IN VEIN: CPT | Mod: XU

## 2025-09-15 ENCOUNTER — APPOINTMENT (OUTPATIENT)
Age: 75
End: 2025-09-15